# Patient Record
Sex: MALE | Race: WHITE | Employment: OTHER | URBAN - METROPOLITAN AREA
[De-identification: names, ages, dates, MRNs, and addresses within clinical notes are randomized per-mention and may not be internally consistent; named-entity substitution may affect disease eponyms.]

---

## 2018-03-27 ENCOUNTER — APPOINTMENT (OUTPATIENT)
Dept: GENERAL RADIOLOGY | Age: 63
End: 2018-03-27
Payer: COMMERCIAL

## 2018-03-27 ENCOUNTER — HOSPITAL ENCOUNTER (EMERGENCY)
Age: 63
Discharge: HOME | End: 2018-03-27
Attending: EMERGENCY MEDICINE
Payer: COMMERCIAL

## 2018-03-27 VITALS
HEART RATE: 89 BPM | TEMPERATURE: 98.2 F | DIASTOLIC BLOOD PRESSURE: 99 MMHG | SYSTOLIC BLOOD PRESSURE: 155 MMHG | OXYGEN SATURATION: 96 % | BODY MASS INDEX: 38.5 KG/M2 | HEIGHT: 74 IN | RESPIRATION RATE: 16 BRPM | WEIGHT: 300 LBS

## 2018-03-27 DIAGNOSIS — S39.012A STRAIN OF LUMBAR REGION, INITIAL ENCOUNTER: Primary | ICD-10-CM

## 2018-03-27 PROCEDURE — 99283 EMERGENCY DEPT VISIT LOW MDM: CPT

## 2018-03-27 PROCEDURE — 6370000000 HC RX 637 (ALT 250 FOR IP): Performed by: EMERGENCY MEDICINE

## 2018-03-27 PROCEDURE — 72100 X-RAY EXAM L-S SPINE 2/3 VWS: CPT

## 2018-03-27 RX ORDER — DIAZEPAM 5 MG/1
5 TABLET ORAL ONCE
Status: COMPLETED | OUTPATIENT
Start: 2018-03-27 | End: 2018-03-27

## 2018-03-27 RX ORDER — DIAZEPAM 5 MG/1
5 TABLET ORAL EVERY 6 HOURS PRN
Qty: 9 TABLET | Refills: 0 | Status: SHIPPED | OUTPATIENT
Start: 2018-03-27 | End: 2018-04-06

## 2018-03-27 RX ORDER — OXYCODONE HYDROCHLORIDE AND ACETAMINOPHEN 5; 325 MG/1; MG/1
1 TABLET ORAL EVERY 4 HOURS PRN
Qty: 12 TABLET | Refills: 0 | Status: SHIPPED | OUTPATIENT
Start: 2018-03-27 | End: 2018-04-03

## 2018-03-27 RX ORDER — OXYCODONE HYDROCHLORIDE AND ACETAMINOPHEN 5; 325 MG/1; MG/1
1 TABLET ORAL ONCE
Status: COMPLETED | OUTPATIENT
Start: 2018-03-27 | End: 2018-03-27

## 2018-03-27 RX ADMIN — OXYCODONE HYDROCHLORIDE AND ACETAMINOPHEN 1 TABLET: 5; 325 TABLET ORAL at 16:27

## 2018-03-27 RX ADMIN — DIAZEPAM 5 MG: 5 TABLET ORAL at 16:27

## 2018-03-27 ASSESSMENT — PAIN SCALES - GENERAL
PAINLEVEL_OUTOF10: 4
PAINLEVEL_OUTOF10: 0
PAINLEVEL_OUTOF10: 2

## 2018-03-27 ASSESSMENT — PAIN DESCRIPTION - PAIN TYPE: TYPE: ACUTE PAIN

## 2018-03-27 ASSESSMENT — PAIN DESCRIPTION - LOCATION: LOCATION: BACK

## 2018-03-27 NOTE — ED PROVIDER NOTES
HPI:   Venecia Lee is a 60 y.o. male presenting to the ED for back pain, beginning one day ago. The complaint has been persistent, mild in severity, and worsened by nothing. Pt states he was at work when he kicked something and began to have worsening back pain. Pt has hx of chronic back pain but does not take any medications for it. He states the pain has worsened since yesterdays event. Pt is also reporting leg numbness but states it has been there for about a month. He called his PCP for the pain and they advised him to come here. Pt denies cough, congestion, fever, chills, N/V/D, abdominal pain, constipation, chest pain, SOB, headache, urinary problems, neck pain or any other symptoms. ROS:   Pertinent positives and negatives are stated within HPI, all other systems reviewed and are negative.    --------------------------------------------- PAST HISTORY ---------------------------------------------  Past Medical History:  has a past medical history of COPD (chronic obstructive pulmonary disease) ([facility] [address] Osteoarthritis; and Sleep apnea. Past Surgical History:  has a past surgical history that includes hernia repair; Uvulectomy; Nose surgery; and Tympanostomy tube placement. Social History:  reports that he quit smoking about 4 years ago. His smoking use included Cigars and Cigarettes. He has a 78.00 pack-year smoking history. He has never used smokeless tobacco. He reports that he drinks alcohol. He reports that he does not use drugs. Family History: family history includes Cancer in his mother; Cirrhosis in his father; High Blood Pressure in his brother; Lupus in his sister; Other in his mother. The patients home medications have been reviewed. Allergies:  Ancef [cefazolin]    -------------------------------------------------- RESULTS -------------------------------------------------  All laboratory and radiology results have been personally reviewed by myself   LABS:  No results

## 2018-03-27 NOTE — ED NOTES
Patient discharged with belongings. Discussed care instructions, follow-up instructions, medications and when to return to the hospital. Patient verbalizes understanding and has no further questions at this time. Electronically signed by Shabnam Ward.  ASHLYN Roman on 3/27/2018 at 5:13 PM       Gerson Lucas RN  03/27/18 [id number]

## 2018-04-29 ENCOUNTER — ANESTHESIA EVENT (OUTPATIENT)
Dept: OPERATING ROOM | Age: 63
DRG: 494 | End: 2018-04-29
Payer: COMMERCIAL

## 2018-04-29 ENCOUNTER — HOSPITAL ENCOUNTER (INPATIENT)
Age: 63
LOS: 3 days | Discharge: NURSING, REHAB OR LONG-TERM CARE | DRG: 494 | End: 2018-05-02
Attending: EMERGENCY MEDICINE | Admitting: FAMILY MEDICINE
Payer: COMMERCIAL

## 2018-04-29 ENCOUNTER — APPOINTMENT (OUTPATIENT)
Dept: GENERAL RADIOLOGY | Age: 63
DRG: 494 | End: 2018-04-29
Payer: COMMERCIAL

## 2018-04-29 ENCOUNTER — ANESTHESIA (OUTPATIENT)
Dept: OPERATING ROOM | Age: 63
DRG: 494 | End: 2018-04-29
Payer: COMMERCIAL

## 2018-04-29 VITALS — DIASTOLIC BLOOD PRESSURE: 87 MMHG | OXYGEN SATURATION: 93 % | SYSTOLIC BLOOD PRESSURE: 137 MMHG | TEMPERATURE: 97.5 F

## 2018-04-29 DIAGNOSIS — S82.892A CLOSED FRACTURE OF LEFT ANKLE, INITIAL ENCOUNTER: Primary | ICD-10-CM

## 2018-04-29 DIAGNOSIS — R52 PAIN: ICD-10-CM

## 2018-04-29 PROBLEM — S82.62XA DISPLACED FRACTURE OF LATERAL MALLEOLUS OF LEFT FIBULA, INITIAL ENCOUNTER FOR CLOSED FRACTURE: Status: ACTIVE | Noted: 2018-04-29

## 2018-04-29 PROBLEM — S93.422A: Status: ACTIVE | Noted: 2018-04-29

## 2018-04-29 LAB
ABO/RH: NORMAL
ALBUMIN SERPL-MCNC: 4.3 G/DL (ref 3.5–5.2)
ALP BLD-CCNC: 80 U/L (ref 40–129)
ALT SERPL-CCNC: 80 U/L (ref 0–40)
ANION GAP SERPL CALCULATED.3IONS-SCNC: 14 MMOL/L (ref 7–16)
ANTIBODY SCREEN: NORMAL
AST SERPL-CCNC: 40 U/L (ref 0–39)
BASOPHILS ABSOLUTE: 0.06 E9/L (ref 0–0.2)
BASOPHILS RELATIVE PERCENT: 0.5 % (ref 0–2)
BILIRUB SERPL-MCNC: 0.5 MG/DL (ref 0–1.2)
BUN BLDV-MCNC: 11 MG/DL (ref 8–23)
CALCIUM SERPL-MCNC: 9.4 MG/DL (ref 8.6–10.2)
CHLORIDE BLD-SCNC: 100 MMOL/L (ref 98–107)
CO2: 23 MMOL/L (ref 22–29)
CREAT SERPL-MCNC: 0.9 MG/DL (ref 0.7–1.2)
EOSINOPHILS ABSOLUTE: 0.09 E9/L (ref 0.05–0.5)
EOSINOPHILS RELATIVE PERCENT: 0.8 % (ref 0–6)
GFR AFRICAN AMERICAN: >60
GFR NON-AFRICAN AMERICAN: >60 ML/MIN/1.73
GLUCOSE BLD-MCNC: 120 MG/DL (ref 74–109)
HCT VFR BLD CALC: 45.5 % (ref 37–54)
HEMOGLOBIN: 15.7 G/DL (ref 12.5–16.5)
IMMATURE GRANULOCYTES #: 0.05 E9/L
IMMATURE GRANULOCYTES %: 0.5 % (ref 0–5)
INR BLD: 1
LYMPHOCYTES ABSOLUTE: 1.98 E9/L (ref 1.5–4)
LYMPHOCYTES RELATIVE PERCENT: 18 % (ref 20–42)
MCH RBC QN AUTO: 32 PG (ref 26–35)
MCHC RBC AUTO-ENTMCNC: 34.5 % (ref 32–34.5)
MCV RBC AUTO: 92.9 FL (ref 80–99.9)
MONOCYTES ABSOLUTE: 1.19 E9/L (ref 0.1–0.95)
MONOCYTES RELATIVE PERCENT: 10.8 % (ref 2–12)
NEUTROPHILS ABSOLUTE: 7.66 E9/L (ref 1.8–7.3)
NEUTROPHILS RELATIVE PERCENT: 69.4 % (ref 43–80)
PDW BLD-RTO: 13.3 FL (ref 11.5–15)
PLATELET # BLD: 246 E9/L (ref 130–450)
PMV BLD AUTO: 9.7 FL (ref 7–12)
POTASSIUM SERPL-SCNC: 4.5 MMOL/L (ref 3.5–5)
PROTHROMBIN TIME: 11.3 SEC (ref 9.3–12.4)
RBC # BLD: 4.9 E12/L (ref 3.8–5.8)
SODIUM BLD-SCNC: 137 MMOL/L (ref 132–146)
TOTAL PROTEIN: 7.4 G/DL (ref 6.4–8.3)
WBC # BLD: 11 E9/L (ref 4.5–11.5)

## 2018-04-29 PROCEDURE — 73610 X-RAY EXAM OF ANKLE: CPT

## 2018-04-29 PROCEDURE — 86901 BLOOD TYPING SEROLOGIC RH(D): CPT

## 2018-04-29 PROCEDURE — 2500000003 HC RX 250 WO HCPCS: Performed by: NURSE ANESTHETIST, CERTIFIED REGISTERED

## 2018-04-29 PROCEDURE — 2580000003 HC RX 258: Performed by: FAMILY MEDICINE

## 2018-04-29 PROCEDURE — 6360000002 HC RX W HCPCS

## 2018-04-29 PROCEDURE — 29515 APPLICATION SHORT LEG SPLINT: CPT

## 2018-04-29 PROCEDURE — 99285 EMERGENCY DEPT VISIT HI MDM: CPT

## 2018-04-29 PROCEDURE — 2500000003 HC RX 250 WO HCPCS: Performed by: ORTHOPAEDIC SURGERY

## 2018-04-29 PROCEDURE — 6360000002 HC RX W HCPCS: Performed by: NURSE ANESTHETIST, CERTIFIED REGISTERED

## 2018-04-29 PROCEDURE — 76000 FLUOROSCOPY <1 HR PHYS/QHP: CPT

## 2018-04-29 PROCEDURE — 2580000003 HC RX 258: Performed by: NURSE ANESTHETIST, CERTIFIED REGISTERED

## 2018-04-29 PROCEDURE — 87081 CULTURE SCREEN ONLY: CPT

## 2018-04-29 PROCEDURE — 6370000000 HC RX 637 (ALT 250 FOR IP): Performed by: ORTHOPAEDIC SURGERY

## 2018-04-29 PROCEDURE — 3700000001 HC ADD 15 MINUTES (ANESTHESIA): Performed by: ORTHOPAEDIC SURGERY

## 2018-04-29 PROCEDURE — 7100000001 HC PACU RECOVERY - ADDTL 15 MIN: Performed by: ORTHOPAEDIC SURGERY

## 2018-04-29 PROCEDURE — 7100000000 HC PACU RECOVERY - FIRST 15 MIN: Performed by: ORTHOPAEDIC SURGERY

## 2018-04-29 PROCEDURE — 3600000004 HC SURGERY LEVEL 4 BASE: Performed by: ORTHOPAEDIC SURGERY

## 2018-04-29 PROCEDURE — C1713 ANCHOR/SCREW BN/BN,TIS/BN: HCPCS | Performed by: ORTHOPAEDIC SURGERY

## 2018-04-29 PROCEDURE — 3600000014 HC SURGERY LEVEL 4 ADDTL 15MIN: Performed by: ORTHOPAEDIC SURGERY

## 2018-04-29 PROCEDURE — 6360000002 HC RX W HCPCS: Performed by: FAMILY MEDICINE

## 2018-04-29 PROCEDURE — 86900 BLOOD TYPING SEROLOGIC ABO: CPT

## 2018-04-29 PROCEDURE — 1200000000 HC SEMI PRIVATE

## 2018-04-29 PROCEDURE — 85610 PROTHROMBIN TIME: CPT

## 2018-04-29 PROCEDURE — 36415 COLL VENOUS BLD VENIPUNCTURE: CPT

## 2018-04-29 PROCEDURE — 0QSK04Z REPOSITION LEFT FIBULA WITH INTERNAL FIXATION DEVICE, OPEN APPROACH: ICD-10-PCS | Performed by: ORTHOPAEDIC SURGERY

## 2018-04-29 PROCEDURE — 80053 COMPREHEN METABOLIC PANEL: CPT

## 2018-04-29 PROCEDURE — 86850 RBC ANTIBODY SCREEN: CPT

## 2018-04-29 PROCEDURE — 85025 COMPLETE CBC W/AUTO DIFF WBC: CPT

## 2018-04-29 PROCEDURE — 94761 N-INVAS EAR/PLS OXIMETRY MLT: CPT

## 2018-04-29 PROCEDURE — 6360000002 HC RX W HCPCS: Performed by: ANESTHESIOLOGY

## 2018-04-29 PROCEDURE — 3700000000 HC ANESTHESIA ATTENDED CARE: Performed by: ORTHOPAEDIC SURGERY

## 2018-04-29 PROCEDURE — 2709999900 HC NON-CHARGEABLE SUPPLY: Performed by: ORTHOPAEDIC SURGERY

## 2018-04-29 DEVICE — IMPLANTABLE DEVICE: Type: IMPLANTABLE DEVICE | Status: FUNCTIONAL

## 2018-04-29 RX ORDER — PROMETHAZINE HYDROCHLORIDE 25 MG/ML
6.25 INJECTION, SOLUTION INTRAMUSCULAR; INTRAVENOUS
Status: DISCONTINUED | OUTPATIENT
Start: 2018-04-29 | End: 2018-04-29

## 2018-04-29 RX ORDER — DEXAMETHASONE SODIUM PHOSPHATE 4 MG/ML
INJECTION, SOLUTION INTRA-ARTICULAR; INTRALESIONAL; INTRAMUSCULAR; INTRAVENOUS; SOFT TISSUE PRN
Status: DISCONTINUED | OUTPATIENT
Start: 2018-04-29 | End: 2018-04-29 | Stop reason: SDUPTHER

## 2018-04-29 RX ORDER — HYDRALAZINE HYDROCHLORIDE 20 MG/ML
INJECTION INTRAMUSCULAR; INTRAVENOUS
Status: COMPLETED
Start: 2018-04-29 | End: 2018-04-29

## 2018-04-29 RX ORDER — OXYCODONE HYDROCHLORIDE AND ACETAMINOPHEN 5; 325 MG/1; MG/1
1 TABLET ORAL
Status: DISCONTINUED | OUTPATIENT
Start: 2018-04-29 | End: 2018-04-29

## 2018-04-29 RX ORDER — MEPERIDINE HYDROCHLORIDE 25 MG/ML
12.5 INJECTION INTRAMUSCULAR; INTRAVENOUS; SUBCUTANEOUS EVERY 5 MIN PRN
Status: DISCONTINUED | OUTPATIENT
Start: 2018-04-29 | End: 2018-04-29

## 2018-04-29 RX ORDER — HYDROCODONE BITARTRATE AND ACETAMINOPHEN 5; 325 MG/1; MG/1
0.5 TABLET ORAL EVERY 6 HOURS PRN
Status: DISCONTINUED | OUTPATIENT
Start: 2018-04-29 | End: 2018-05-02 | Stop reason: HOSPADM

## 2018-04-29 RX ORDER — HYDROCODONE BITARTRATE AND ACETAMINOPHEN 5; 325 MG/1; MG/1
1 TABLET ORAL EVERY 6 HOURS PRN
Status: DISCONTINUED | OUTPATIENT
Start: 2018-04-29 | End: 2018-05-02 | Stop reason: HOSPADM

## 2018-04-29 RX ORDER — PROPOFOL 10 MG/ML
INJECTION, EMULSION INTRAVENOUS PRN
Status: DISCONTINUED | OUTPATIENT
Start: 2018-04-29 | End: 2018-04-29 | Stop reason: SDUPTHER

## 2018-04-29 RX ORDER — DIPHENHYDRAMINE HYDROCHLORIDE 50 MG/ML
12.5 INJECTION INTRAMUSCULAR; INTRAVENOUS
Status: DISCONTINUED | OUTPATIENT
Start: 2018-04-29 | End: 2018-04-29

## 2018-04-29 RX ORDER — BUPIVACAINE HYDROCHLORIDE AND EPINEPHRINE 2.5; 5 MG/ML; UG/ML
INJECTION, SOLUTION EPIDURAL; INFILTRATION; INTRACAUDAL; PERINEURAL PRN
Status: DISCONTINUED | OUTPATIENT
Start: 2018-04-29 | End: 2018-04-29 | Stop reason: HOSPADM

## 2018-04-29 RX ORDER — NEOSTIGMINE METHYLSULFATE 1 MG/ML
INJECTION, SOLUTION INTRAVENOUS PRN
Status: DISCONTINUED | OUTPATIENT
Start: 2018-04-29 | End: 2018-04-29 | Stop reason: SDUPTHER

## 2018-04-29 RX ORDER — MORPHINE SULFATE 4 MG/ML
4 INJECTION, SOLUTION INTRAMUSCULAR; INTRAVENOUS
Status: DISCONTINUED | OUTPATIENT
Start: 2018-04-29 | End: 2018-05-02 | Stop reason: HOSPADM

## 2018-04-29 RX ORDER — FENTANYL CITRATE 50 UG/ML
25 INJECTION, SOLUTION INTRAMUSCULAR; INTRAVENOUS EVERY 5 MIN PRN
Status: DISCONTINUED | OUTPATIENT
Start: 2018-04-29 | End: 2018-04-29

## 2018-04-29 RX ORDER — SODIUM CHLORIDE, SODIUM LACTATE, POTASSIUM CHLORIDE, CALCIUM CHLORIDE 600; 310; 30; 20 MG/100ML; MG/100ML; MG/100ML; MG/100ML
INJECTION, SOLUTION INTRAVENOUS CONTINUOUS PRN
Status: DISCONTINUED | OUTPATIENT
Start: 2018-04-29 | End: 2018-04-29 | Stop reason: SDUPTHER

## 2018-04-29 RX ORDER — ACETAMINOPHEN 325 MG/1
650 TABLET ORAL EVERY 4 HOURS PRN
Status: DISCONTINUED | OUTPATIENT
Start: 2018-04-29 | End: 2018-05-02 | Stop reason: HOSPADM

## 2018-04-29 RX ORDER — LABETALOL HYDROCHLORIDE 5 MG/ML
INJECTION, SOLUTION INTRAVENOUS PRN
Status: DISCONTINUED | OUTPATIENT
Start: 2018-04-29 | End: 2018-04-29 | Stop reason: SDUPTHER

## 2018-04-29 RX ORDER — SUCCINYLCHOLINE CHLORIDE 20 MG/ML
INJECTION INTRAMUSCULAR; INTRAVENOUS PRN
Status: DISCONTINUED | OUTPATIENT
Start: 2018-04-29 | End: 2018-04-29 | Stop reason: SDUPTHER

## 2018-04-29 RX ORDER — ROCURONIUM BROMIDE 10 MG/ML
INJECTION, SOLUTION INTRAVENOUS PRN
Status: DISCONTINUED | OUTPATIENT
Start: 2018-04-29 | End: 2018-04-29 | Stop reason: SDUPTHER

## 2018-04-29 RX ORDER — LIDOCAINE HYDROCHLORIDE 20 MG/ML
INJECTION, SOLUTION EPIDURAL; INFILTRATION; INTRACAUDAL; PERINEURAL PRN
Status: DISCONTINUED | OUTPATIENT
Start: 2018-04-29 | End: 2018-04-29 | Stop reason: SDUPTHER

## 2018-04-29 RX ORDER — ONDANSETRON 2 MG/ML
INJECTION INTRAMUSCULAR; INTRAVENOUS PRN
Status: DISCONTINUED | OUTPATIENT
Start: 2018-04-29 | End: 2018-04-29 | Stop reason: SDUPTHER

## 2018-04-29 RX ORDER — HYDRALAZINE HYDROCHLORIDE 20 MG/ML
5 INJECTION INTRAMUSCULAR; INTRAVENOUS ONCE
Status: COMPLETED | OUTPATIENT
Start: 2018-04-29 | End: 2018-04-29

## 2018-04-29 RX ORDER — SODIUM CHLORIDE 9 MG/ML
INJECTION, SOLUTION INTRAVENOUS CONTINUOUS
Status: DISCONTINUED | OUTPATIENT
Start: 2018-04-29 | End: 2018-05-01

## 2018-04-29 RX ORDER — FENTANYL CITRATE 50 UG/ML
50 INJECTION, SOLUTION INTRAMUSCULAR; INTRAVENOUS EVERY 5 MIN PRN
Status: DISCONTINUED | OUTPATIENT
Start: 2018-04-29 | End: 2018-04-29

## 2018-04-29 RX ORDER — SODIUM CHLORIDE 0.9 % (FLUSH) 0.9 %
10 SYRINGE (ML) INJECTION PRN
Status: DISCONTINUED | OUTPATIENT
Start: 2018-04-29 | End: 2018-05-02 | Stop reason: HOSPADM

## 2018-04-29 RX ORDER — GLYCOPYRROLATE 0.2 MG/ML
INJECTION INTRAMUSCULAR; INTRAVENOUS PRN
Status: DISCONTINUED | OUTPATIENT
Start: 2018-04-29 | End: 2018-04-29 | Stop reason: SDUPTHER

## 2018-04-29 RX ORDER — ALBUTEROL SULFATE 2.5 MG/3ML
2.5 SOLUTION RESPIRATORY (INHALATION) EVERY 6 HOURS PRN
Status: DISCONTINUED | OUTPATIENT
Start: 2018-04-29 | End: 2018-05-02 | Stop reason: HOSPADM

## 2018-04-29 RX ORDER — ASPIRIN 81 MG/1
81 TABLET ORAL DAILY
Status: DISCONTINUED | OUTPATIENT
Start: 2018-04-30 | End: 2018-05-02 | Stop reason: HOSPADM

## 2018-04-29 RX ORDER — ONDANSETRON 2 MG/ML
4 INJECTION INTRAMUSCULAR; INTRAVENOUS EVERY 6 HOURS PRN
Status: DISCONTINUED | OUTPATIENT
Start: 2018-04-29 | End: 2018-05-02 | Stop reason: HOSPADM

## 2018-04-29 RX ORDER — FENTANYL CITRATE 50 UG/ML
INJECTION, SOLUTION INTRAMUSCULAR; INTRAVENOUS PRN
Status: DISCONTINUED | OUTPATIENT
Start: 2018-04-29 | End: 2018-04-29 | Stop reason: SDUPTHER

## 2018-04-29 RX ORDER — MIDAZOLAM HYDROCHLORIDE 1 MG/ML
INJECTION INTRAMUSCULAR; INTRAVENOUS PRN
Status: DISCONTINUED | OUTPATIENT
Start: 2018-04-29 | End: 2018-04-29 | Stop reason: SDUPTHER

## 2018-04-29 RX ORDER — CLINDAMYCIN PHOSPHATE 600 MG/50ML
600 INJECTION INTRAVENOUS EVERY 8 HOURS
Status: COMPLETED | OUTPATIENT
Start: 2018-04-29 | End: 2018-04-30

## 2018-04-29 RX ORDER — ERGOCALCIFEROL 1.25 MG/1
50000 CAPSULE ORAL WEEKLY
Status: DISCONTINUED | OUTPATIENT
Start: 2018-04-29 | End: 2018-05-02 | Stop reason: HOSPADM

## 2018-04-29 RX ORDER — MORPHINE SULFATE 2 MG/ML
2 INJECTION, SOLUTION INTRAMUSCULAR; INTRAVENOUS EVERY 4 HOURS PRN
Status: DISCONTINUED | OUTPATIENT
Start: 2018-04-29 | End: 2018-05-02 | Stop reason: HOSPADM

## 2018-04-29 RX ORDER — SODIUM CHLORIDE 0.9 % (FLUSH) 0.9 %
10 SYRINGE (ML) INJECTION EVERY 12 HOURS SCHEDULED
Status: DISCONTINUED | OUTPATIENT
Start: 2018-04-29 | End: 2018-05-02 | Stop reason: HOSPADM

## 2018-04-29 RX ORDER — CLINDAMYCIN PHOSPHATE 600 MG/50ML
600 INJECTION INTRAVENOUS ONCE
Status: COMPLETED | OUTPATIENT
Start: 2018-04-29 | End: 2018-04-29

## 2018-04-29 RX ADMIN — FENTANYL CITRATE 50 MCG: 50 INJECTION, SOLUTION INTRAMUSCULAR; INTRAVENOUS at 14:44

## 2018-04-29 RX ADMIN — Medication 10 ML: at 09:10

## 2018-04-29 RX ADMIN — ONDANSETRON 4 MG: 2 INJECTION, SOLUTION INTRAMUSCULAR; INTRAVENOUS at 14:55

## 2018-04-29 RX ADMIN — DEXAMETHASONE SODIUM PHOSPHATE 10 MG: 4 INJECTION, SOLUTION INTRA-ARTICULAR; INTRALESIONAL; INTRAMUSCULAR; INTRAVENOUS; SOFT TISSUE at 14:55

## 2018-04-29 RX ADMIN — FENTANYL CITRATE 100 MCG: 50 INJECTION, SOLUTION INTRAMUSCULAR; INTRAVENOUS at 15:45

## 2018-04-29 RX ADMIN — HYDROMORPHONE HYDROCHLORIDE 0.5 MG: 1 INJECTION, SOLUTION INTRAMUSCULAR; INTRAVENOUS; SUBCUTANEOUS at 16:07

## 2018-04-29 RX ADMIN — LIDOCAINE HYDROCHLORIDE 80 MG: 20 INJECTION, SOLUTION EPIDURAL; INFILTRATION; INTRACAUDAL; PERINEURAL at 14:44

## 2018-04-29 RX ADMIN — Medication 180 MG: at 14:44

## 2018-04-29 RX ADMIN — ERGOCALCIFEROL 50000 UNITS: 1.25 CAPSULE ORAL at 22:11

## 2018-04-29 RX ADMIN — LABETALOL HYDROCHLORIDE 10 MG: 5 INJECTION, SOLUTION INTRAVENOUS at 14:59

## 2018-04-29 RX ADMIN — MORPHINE SULFATE 2 MG: 2 INJECTION, SOLUTION INTRAMUSCULAR; INTRAVENOUS at 10:14

## 2018-04-29 RX ADMIN — FENTANYL CITRATE 50 MCG: 50 INJECTION, SOLUTION INTRAMUSCULAR; INTRAVENOUS at 16:00

## 2018-04-29 RX ADMIN — FENTANYL CITRATE 50 MCG: 50 INJECTION, SOLUTION INTRAMUSCULAR; INTRAVENOUS at 15:54

## 2018-04-29 RX ADMIN — ROCURONIUM BROMIDE 20 MG: 10 SOLUTION INTRAVENOUS at 14:55

## 2018-04-29 RX ADMIN — SODIUM CHLORIDE, POTASSIUM CHLORIDE, SODIUM LACTATE AND CALCIUM CHLORIDE: 600; 310; 30; 20 INJECTION, SOLUTION INTRAVENOUS at 14:25

## 2018-04-29 RX ADMIN — CLINDAMYCIN PHOSPHATE 600 MG: 600 INJECTION INTRAVENOUS at 22:11

## 2018-04-29 RX ADMIN — MIDAZOLAM HYDROCHLORIDE 2 MG: 1 INJECTION, SOLUTION INTRAMUSCULAR; INTRAVENOUS at 14:39

## 2018-04-29 RX ADMIN — PROPOFOL 200 MG: 10 INJECTION, EMULSION INTRAVENOUS at 14:44

## 2018-04-29 RX ADMIN — Medication 3 MG: at 15:30

## 2018-04-29 RX ADMIN — HYDRALAZINE HYDROCHLORIDE 5 MG: 20 INJECTION INTRAMUSCULAR; INTRAVENOUS at 16:28

## 2018-04-29 RX ADMIN — HYDROCODONE BITARTRATE AND ACETAMINOPHEN 1 TABLET: 5; 325 TABLET ORAL at 18:20

## 2018-04-29 RX ADMIN — FENTANYL CITRATE 50 MCG: 50 INJECTION, SOLUTION INTRAMUSCULAR; INTRAVENOUS at 14:55

## 2018-04-29 RX ADMIN — CLINDAMYCIN PHOSPHATE 600 MG: 600 INJECTION INTRAVENOUS at 14:25

## 2018-04-29 RX ADMIN — Medication 0.6 MG: at 15:30

## 2018-04-29 RX ADMIN — SODIUM CHLORIDE: 9 INJECTION, SOLUTION INTRAVENOUS at 10:09

## 2018-04-29 RX ADMIN — FENTANYL CITRATE 50 MCG: 50 INJECTION, SOLUTION INTRAMUSCULAR; INTRAVENOUS at 14:39

## 2018-04-29 RX ADMIN — LABETALOL HYDROCHLORIDE 5 MG: 5 INJECTION, SOLUTION INTRAVENOUS at 15:32

## 2018-04-29 ASSESSMENT — PAIN DESCRIPTION - ONSET
ONSET: GRADUAL

## 2018-04-29 ASSESSMENT — PULMONARY FUNCTION TESTS
PIF_VALUE: 4
PIF_VALUE: 2
PIF_VALUE: 27
PIF_VALUE: 22
PIF_VALUE: 22
PIF_VALUE: 27
PIF_VALUE: 2
PIF_VALUE: 1
PIF_VALUE: 35
PIF_VALUE: 27
PIF_VALUE: 22
PIF_VALUE: 22
PIF_VALUE: 27
PIF_VALUE: 27
PIF_VALUE: 25
PIF_VALUE: 27
PIF_VALUE: 27
PIF_VALUE: 21
PIF_VALUE: 27
PIF_VALUE: 36
PIF_VALUE: 27
PIF_VALUE: 1
PIF_VALUE: 1
PIF_VALUE: 27
PIF_VALUE: 27
PIF_VALUE: 22
PIF_VALUE: 25
PIF_VALUE: 27
PIF_VALUE: 25
PIF_VALUE: 27
PIF_VALUE: 26
PIF_VALUE: 27
PIF_VALUE: 1
PIF_VALUE: 27
PIF_VALUE: 27
PIF_VALUE: 22
PIF_VALUE: 27
PIF_VALUE: 21
PIF_VALUE: 25
PIF_VALUE: 1
PIF_VALUE: 28
PIF_VALUE: 26
PIF_VALUE: 37
PIF_VALUE: 27
PIF_VALUE: 26
PIF_VALUE: 27
PIF_VALUE: 27
PIF_VALUE: 25
PIF_VALUE: 27
PIF_VALUE: 27

## 2018-04-29 ASSESSMENT — PAIN SCALES - GENERAL
PAINLEVEL_OUTOF10: 4
PAINLEVEL_OUTOF10: 9
PAINLEVEL_OUTOF10: 5
PAINLEVEL_OUTOF10: 6
PAINLEVEL_OUTOF10: 7
PAINLEVEL_OUTOF10: 7
PAINLEVEL_OUTOF10: 6
PAINLEVEL_OUTOF10: 9
PAINLEVEL_OUTOF10: 5
PAINLEVEL_OUTOF10: 7
PAINLEVEL_OUTOF10: 6

## 2018-04-29 ASSESSMENT — PAIN DESCRIPTION - LOCATION
LOCATION: ANKLE

## 2018-04-29 ASSESSMENT — PAIN DESCRIPTION - PAIN TYPE
TYPE: ACUTE PAIN
TYPE: SURGICAL PAIN
TYPE: ACUTE PAIN

## 2018-04-29 ASSESSMENT — PAIN DESCRIPTION - FREQUENCY
FREQUENCY: CONTINUOUS

## 2018-04-29 ASSESSMENT — PAIN DESCRIPTION - PROGRESSION
CLINICAL_PROGRESSION: GRADUALLY IMPROVING
CLINICAL_PROGRESSION: GRADUALLY WORSENING
CLINICAL_PROGRESSION: GRADUALLY WORSENING
CLINICAL_PROGRESSION: NOT CHANGED
CLINICAL_PROGRESSION: NOT CHANGED
CLINICAL_PROGRESSION: GRADUALLY WORSENING

## 2018-04-29 ASSESSMENT — PAIN DESCRIPTION - ORIENTATION
ORIENTATION: LEFT

## 2018-04-29 ASSESSMENT — PAIN DESCRIPTION - DESCRIPTORS
DESCRIPTORS: DISCOMFORT
DESCRIPTORS: ACHING;DISCOMFORT
DESCRIPTORS: DISCOMFORT
DESCRIPTORS: ACHING;DISCOMFORT
DESCRIPTORS: ACHING;DISCOMFORT
DESCRIPTORS: PATIENT UNABLE TO DESCRIBE
DESCRIPTORS: DISCOMFORT

## 2018-04-29 NOTE — ED PROVIDER NOTES
[facility]     Department of Emergency Medicine   ED  Provider Note  Admit Date/RoomTime: 4/29/2018  2:46 AM  ED Room: 23/23  Chief Complaint   Foot Injury    History of Present Illness   Source of history provided by:  patient. History/Exam Limitations: none. Neil Le is a 60 y.o. old male who has a past medical history of:   Past Medical History:   Diagnosis Date    COPD (chronic obstructive pulmonary disease) ([facility] [address]     Osteoarthritis     Sleep apnea     Spinal stenosis     presents to the emergency department via EMS from home, for a fall which occured 1 hour(s) prior to arrival. He was reportedly getting intyo house using walker while at home prior to incident with complaints of left ankle pain. The patients tetanus status is up to date. Since onset the symptoms have been moderate in degree. His pain is aggraveated by movement, use and palpation and relieved by splint applied by EMS . He denies any head injury, loss of consciousness, neck pain, chest pain or abdominal pain. ROS    Pertinent positives and negatives are stated within HPI, all other systems reviewed and are negative. Past Surgical History:   Procedure Laterality Date    HERNIA REPAIR      NOSE SURGERY      TYMPANOSTOMY TUBE PLACEMENT      UVULECTOMY     Social History:  reports that he quit smoking about 4 years ago. His smoking use included Cigars and Cigarettes. He has a 78.00 pack-year smoking history. He has never used smokeless tobacco. He reports that he drinks alcohol. He reports that he does not use drugs. Family History: family history includes Cancer in his mother; Cirrhosis in his father; High Blood Pressure in his brother; Lupus in his sister; Other in his mother. Allergies:  Ancef [cefazolin]    Physical Exam   Oxygen Saturation Interpretation: Normal.  ED Triage Vitals [04/29/18 0252]   BP Temp Temp Source Pulse Resp SpO2 Height Weight   (!) 161/83 97.7 °F (36.5 °C) Oral 77 18 94 % 6' 2\"

## 2018-04-29 NOTE — ED NOTES
Bed: 23  Expected date:   Expected time:   Means of arrival:   Comments:  EMS fall     Anneliese Antoine RN  20/04/66 0246

## 2018-04-29 NOTE — CONSULTS
Cc left ankle pain    hpi  Fell getting into house, using walker    Dx left displaced lateral mallelous and deltoid ligament injury    recc internal fixation    anes time approx 45 min and no ebl    Orders placed for surgery this afternoon    Will see patient prior to surgery    Past Surgical History:   Procedure Laterality Date    HERNIA REPAIR      NOSE SURGERY      TYMPANOSTOMY TUBE PLACEMENT      UVULECTOMY       Past Medical History:   Diagnosis Date    [facility]ch[facility] obstructive pulmonary disease) ([facility] [address]     Osteoarthritis     Sleep apnea     Spinal stenosis      Scheduled Meds:   sodium chloride flush  10 mL Intravenous 2 times per day    clindamycin (CLEOCIN) IV  600 mg Intravenous Once    vitamin D  50,000 Units Oral Weekly     Continuous Infusions:   sodium chloride       PRN Meds:.sodium chloride flush, acetaminophen, morphine, morphine, HYDROcodone 5 mg - acetaminophen, HYDROcodone 5 mg - acetaminophen, ondansetron  Allergies   Allergen Reactions    Ancef [Cefazolin]      etoh 7-8/day    tob neg    Ros Denies change in bowel or bladder habits. Denies unexpected weight loss. Denies temp      Family History   Problem Relation Age of Onset    Cirrhosis Father     Lupus Sister     Cancer Mother     Other Mother      copd    High Blood Pressure Brother      Vitals:    04/29/18 0750   BP: (!) 156/76   Pulse: 84   Resp: 16   Temp: 98.2 °F (36.8 °C)   SpO2: 93%     Risk, benefits and options discussed with patient. he has verbalized understanding of options. The possibility of complications were also discussed to include but not limited to nerve damage, infection, problems with healing, vascular injury, chronic pain, stiffness, dysfunction, repeat surgery, symptomatic hardware and or its failure, dislocation, and blood clot. Post-op care, work, activity and restrictions were also discussed with patient and they verbalized and agreed with the restrictions.

## 2018-04-30 PROCEDURE — 6370000000 HC RX 637 (ALT 250 FOR IP): Performed by: ORTHOPAEDIC SURGERY

## 2018-04-30 PROCEDURE — G8988 SELF CARE GOAL STATUS: HCPCS

## 2018-04-30 PROCEDURE — 97165 OT EVAL LOW COMPLEX 30 MIN: CPT

## 2018-04-30 PROCEDURE — G8978 MOBILITY CURRENT STATUS: HCPCS

## 2018-04-30 PROCEDURE — 97162 PT EVAL MOD COMPLEX 30 MIN: CPT

## 2018-04-30 PROCEDURE — 1200000000 HC SEMI PRIVATE

## 2018-04-30 PROCEDURE — 2580000003 HC RX 258: Performed by: FAMILY MEDICINE

## 2018-04-30 PROCEDURE — 99222 1ST HOSP IP/OBS MODERATE 55: CPT | Performed by: NEUROLOGICAL SURGERY

## 2018-04-30 PROCEDURE — 97530 THERAPEUTIC ACTIVITIES: CPT

## 2018-04-30 PROCEDURE — G8979 MOBILITY GOAL STATUS: HCPCS

## 2018-04-30 PROCEDURE — 2500000003 HC RX 250 WO HCPCS: Performed by: ORTHOPAEDIC SURGERY

## 2018-04-30 PROCEDURE — G8987 SELF CARE CURRENT STATUS: HCPCS

## 2018-04-30 RX ORDER — ASPIRIN 81 MG/1
81 TABLET ORAL DAILY
Qty: 45 TABLET | Refills: 0 | Status: SHIPPED | OUTPATIENT
Start: 2018-05-01 | End: 2019-01-14

## 2018-04-30 RX ORDER — HYDROCODONE BITARTRATE AND ACETAMINOPHEN 5; 325 MG/1; MG/1
1 TABLET ORAL EVERY 6 HOURS PRN
Qty: 28 TABLET | Refills: 0 | Status: SHIPPED | OUTPATIENT
Start: 2018-04-30 | End: 2018-05-07

## 2018-04-30 RX ADMIN — CLINDAMYCIN PHOSPHATE 600 MG: 600 INJECTION INTRAVENOUS at 14:20

## 2018-04-30 RX ADMIN — ASPIRIN 81 MG: 81 TABLET, COATED ORAL at 08:14

## 2018-04-30 RX ADMIN — HYDROCODONE BITARTRATE AND ACETAMINOPHEN 1 TABLET: 5; 325 TABLET ORAL at 20:37

## 2018-04-30 RX ADMIN — SODIUM CHLORIDE: 9 INJECTION, SOLUTION INTRAVENOUS at 14:20

## 2018-04-30 RX ADMIN — CLINDAMYCIN PHOSPHATE 600 MG: 600 INJECTION INTRAVENOUS at 06:39

## 2018-04-30 RX ADMIN — HYDROCODONE BITARTRATE AND ACETAMINOPHEN 1 TABLET: 5; 325 TABLET ORAL at 07:28

## 2018-04-30 RX ADMIN — HYDROCODONE BITARTRATE AND ACETAMINOPHEN 1 TABLET: 5; 325 TABLET ORAL at 01:26

## 2018-04-30 RX ADMIN — HYDROCODONE BITARTRATE AND ACETAMINOPHEN 1 TABLET: 5; 325 TABLET ORAL at 14:29

## 2018-04-30 ASSESSMENT — PAIN DESCRIPTION - LOCATION
LOCATION: ANKLE

## 2018-04-30 ASSESSMENT — PAIN DESCRIPTION - FREQUENCY
FREQUENCY: CONTINUOUS

## 2018-04-30 ASSESSMENT — PAIN DESCRIPTION - PROGRESSION
CLINICAL_PROGRESSION: GRADUALLY WORSENING
CLINICAL_PROGRESSION: GRADUALLY WORSENING

## 2018-04-30 ASSESSMENT — PAIN DESCRIPTION - ORIENTATION
ORIENTATION: LEFT

## 2018-04-30 ASSESSMENT — PAIN DESCRIPTION - PAIN TYPE
TYPE: SURGICAL PAIN;ACUTE PAIN
TYPE: SURGICAL PAIN

## 2018-04-30 ASSESSMENT — PAIN DESCRIPTION - ONSET
ONSET: ON-GOING
ONSET: ON-GOING

## 2018-04-30 ASSESSMENT — PAIN SCALES - GENERAL
PAINLEVEL_OUTOF10: 6
PAINLEVEL_OUTOF10: 4
PAINLEVEL_OUTOF10: 6
PAINLEVEL_OUTOF10: 0
PAINLEVEL_OUTOF10: 7
PAINLEVEL_OUTOF10: 4
PAINLEVEL_OUTOF10: 6

## 2018-04-30 ASSESSMENT — PAIN DESCRIPTION - DESCRIPTORS
DESCRIPTORS: ACHING;CONSTANT;DISCOMFORT
DESCRIPTORS: ACHING;DISCOMFORT
DESCRIPTORS: ACHING;DISCOMFORT
DESCRIPTORS: ACHING;DISCOMFORT;DULL

## 2018-04-30 NOTE — PROGRESS NOTES
Pod #1    comfortable    Vitals:    04/30/18 0736   BP: (!) 149/69   Pulse: 82   Resp: 16   Temp: 98.2 °F (36.8 °C)   SpO2: 99%     Dressing c/d/I  Toes intact  No change in diminished sensation    Xray good    Consult placed for Neurosurg    nwb lle  oob  Elevate ice  Ok to transfer from ortho standpoint

## 2018-04-30 NOTE — OP NOTE
fracture site was a little bit more lateral than  posterior, and the plate was entered as such. Three bicortical screws were  placed proximal to the fracture site. The clamp was removed. The  syndesmosis was checked. Hard copy fluoroscopy was obtained with  projections in the AP, lateral, and mortise position. There was no  evidence of medial clear space widening any further, and when I stressed  the ankle under live fluoro, it did not open up medially. The wound was  copiously irrigated, closed in layers with a deep 2-0 Vicryl stitch  followed by 2-0 subcutaneous and staples in the skin. It was sterilely  dressed, and he was placed into a U-splint. POSTOPERATIVE PLAN:  He will be nonweightbearing on his left leg. He will  follow up in the office in two weeks' time with x-rays out of plaster with  the plan to move him into a short-leg cast.  We will supplement his diet  with a baby aspirin for DVT prophylaxis for six weeks postop, and most  likely we will send him home on Norco for pain management.         Scarlett Forbes MD    D: 04/29/2018 15:59:39       T: 04/30/2018 1:01:36     [email or web address]  Job#: [id number]     Doc#: [id number]    CC:  Ana Matamoros DO

## 2018-04-30 NOTE — PROGRESS NOTES
Physical Therapy  Initial Assessment     Name: Katerine Sr  : 1955  MRN: [id number]    Date of Service: 2018    Evaluating PT: Bunny Chung, PT, DPT [id number]    Room #:  4918/9849-N    Diagnosis: L ankle fracture  Precautions: Fall risk, NWB L LE, spinal stenosis with B LE weakness/sensation deficits  Procedures: ORIF L ankle ()    Pt lives with wife in a single story, first level aparment with 1 stairs and no rail(s) to enter. Bed is on the first floor and bath is on the first floor. Pt ambulated with [employer] for approximately 6 weeks prior to admission due to increased LE weakness due to spinal stenosis. Wife works as a DJ in the PM but is home during the day. Initial Evaluation  Date: 18 Treatment Date: Short Term/ Long Term   Goals   AM-PAC 6 Clicks 60/90     Was pt agreeable to Eval/treatment? Yes     Does pt have pain? L ankle pain     Bed Mobility  Rolling: NT  Supine to sit: NT  Sit to supine: NT  Scooting: Min A toward bedside chair  Rolling: SBA  Supine to sit: SBA  Sit to supine: SBA  Scooting: SBA   Transfers Sit to stand: Mod A x2  Stand to sit: Mod A x2  Stand pivot: Max A x2 with [employer] x2 reps  Sit to stand: Min A  Stand to sit: Min A  Stand pivot: Min A with [employer]   Ambulation   NT due to safety concerns  10 feet with [employer] with Min A  Self-propel 50 feet with steer-able knee walker Mod Independent   Stair negotiation: NT  NA   ROM B UE: Refer to OT note  B LE: L ankle limited due to splint     Strength B UE: Refer to OT note  B LE: 4/5  5/5   Balance Sitting EOB: SBA  Dynamic standing: Max A x2  Sitting EOB: Supervision  Dynamic standing: Min A at [employer]     Pt is A & O x: 4 to person, place, month, and year. Sensation: Pt reports B LE numbness and tingling due to spinal stenosis. Edema: Unremarkable. ASSESSMENT    Patient education  Pt educated on proper technique during sit to stand transfer.     Patient response to education:   Pt verbalized understanding Pt demonstrated skill Pt

## 2018-04-30 NOTE — CONSULTS
[address] [address] NEUROSURGERY     Patient: Ricky Still   : 1955  MRN: [id number]    Date of Consultation: 2018  Date of Service: 2018    Reason for Consultation:Lumbar Stenosis     History of Present Illness: This is a 60year old white male who presented to the ED s/p fall at home while using a walker. Patient c/o moderate back pain, bilateral radicular pain following S1, and proximal leg weakness. Patient states the symptoms have become acutely worse over the last week. C/o n/t in the feet, states he is unable to feel his feet and legs when he walks. Denies loss of bowel or bladder function. Denies trying PT and Pain management. OR yesterday with Ortho for ORIF of left lateral malleolus. Allergies: Ancef [cefazolin]    Past Medical History:      Diagnosis Date    COPD (chronic obstructive pulmonary disease) ([address]     Osteoarthritis     Sleep apnea     Spinal stenosis        Surgical History:      Procedure Laterality Date    HERNIA REPAIR      NOSE SURGERY      TYMPANOSTOMY TUBE PLACEMENT      UVULECTOMY         Social History:   reports that he quit smoking about 4 years ago. His smoking use included Cigars and Cigarettes. He has a 78.00 pack-year smoking history. He has never used smokeless tobacco.   reports that he drinks alcohol.     Family History:      Problem Relation Age of Onset    Cirrhosis Father     Lupus Sister     Cancer Mother     Other Mother      copd    High Blood Pressure Brother        Review of Systems:  Denies fever, chills, or night sweats  Denies headache, dizziness, syncope  Denies blurred vision, double vision  Denies chest pain, palpitations, SOB  Denies diarrhea, constipation, n/v  Denies dysuria, hematuria  Denies recent infections  Denies easy bruising  Denies anxiety, depression    Physical Exam:  WDWN, resting comfortable, no apparent distress  Appears stated age  Vitals stable  Non-labored breathing   A&O x 3, normal affect Head is normocephalic, atraumatic   No palpable lymphadenopathy   Abdomen soft, nontender  Pupils equal and reactive, no scleral icterus  EOMI bilaterally  Cranial nerves II-XII intact bilaterally  No drift  5/5 in BUE  4/5 in BLE, limited in LLE due to casting   Decreased sensation over L4, L5, and S1 bilaterally   Toes going down  Skin warm and dry    Review of Imaging: [facility] lumbar spine [facility]    Assessment: Patient with lumbar stenosis, degenerative changes and scoliosis. Recent ORIF of left leg with Ortho. Plan:  -MRI findings discussed with patient--due to recent ORIF and NWB status, it is not a realistic option for lumbar surgery as he will not be able to participate in the appropriate post-operative care/expectations for maximum results. -Recommending PT/rehab placement, may perform core/back and LE exercises from [facility] POV  -Can also try MEENA in the interim  -F/U once cleared with Ortho as an outpatient    I have interviewed and examined the patient and agree with above. He has lumbar stenosis.  Follow up in the office in 4 weeks after ortho surgery    Andrew Dill

## 2018-05-01 LAB — MRSA CULTURE ONLY: NORMAL

## 2018-05-01 PROCEDURE — 97530 THERAPEUTIC ACTIVITIES: CPT

## 2018-05-01 PROCEDURE — 1200000000 HC SEMI PRIVATE

## 2018-05-01 PROCEDURE — 6360000002 HC RX W HCPCS: Performed by: FAMILY MEDICINE

## 2018-05-01 PROCEDURE — 6370000000 HC RX 637 (ALT 250 FOR IP): Performed by: ORTHOPAEDIC SURGERY

## 2018-05-01 PROCEDURE — 2580000003 HC RX 258: Performed by: FAMILY MEDICINE

## 2018-05-01 RX ADMIN — HYDROCODONE BITARTRATE AND ACETAMINOPHEN 1 TABLET: 5; 325 TABLET ORAL at 03:09

## 2018-05-01 RX ADMIN — HYDROCODONE BITARTRATE AND ACETAMINOPHEN 1 TABLET: 5; 325 TABLET ORAL at 12:00

## 2018-05-01 RX ADMIN — Medication 10 ML: at 20:20

## 2018-05-01 RX ADMIN — ASPIRIN 81 MG: 81 TABLET, COATED ORAL at 08:25

## 2018-05-01 RX ADMIN — HYDROCODONE BITARTRATE AND ACETAMINOPHEN 1 TABLET: 5; 325 TABLET ORAL at 20:20

## 2018-05-01 RX ADMIN — ENOXAPARIN SODIUM 40 MG: 40 INJECTION, SOLUTION INTRAVENOUS; SUBCUTANEOUS at 08:25

## 2018-05-01 RX ADMIN — Medication 10 ML: at 08:26

## 2018-05-01 ASSESSMENT — PAIN DESCRIPTION - PROGRESSION
CLINICAL_PROGRESSION: GRADUALLY WORSENING
CLINICAL_PROGRESSION: GRADUALLY WORSENING

## 2018-05-01 ASSESSMENT — PAIN SCALES - GENERAL
PAINLEVEL_OUTOF10: 6
PAINLEVEL_OUTOF10: 7
PAINLEVEL_OUTOF10: 0
PAINLEVEL_OUTOF10: 0
PAINLEVEL_OUTOF10: 6

## 2018-05-01 ASSESSMENT — PAIN DESCRIPTION - LOCATION
LOCATION: ANKLE
LOCATION: ANKLE

## 2018-05-01 ASSESSMENT — PAIN DESCRIPTION - DESCRIPTORS
DESCRIPTORS: ACHING;DISCOMFORT;SORE
DESCRIPTORS: ACHING;DISCOMFORT;SORE

## 2018-05-01 ASSESSMENT — PAIN DESCRIPTION - ONSET
ONSET: ON-GOING
ONSET: ON-GOING

## 2018-05-01 ASSESSMENT — PAIN DESCRIPTION - FREQUENCY
FREQUENCY: CONTINUOUS
FREQUENCY: CONTINUOUS

## 2018-05-01 ASSESSMENT — PAIN DESCRIPTION - ORIENTATION
ORIENTATION: LEFT
ORIENTATION: LEFT

## 2018-05-01 ASSESSMENT — PAIN DESCRIPTION - PAIN TYPE
TYPE: ACUTE PAIN
TYPE: ACUTE PAIN

## 2018-05-01 NOTE — PROGRESS NOTES
[facility] Quality Flow/Interdisciplinary Rounds Progress Note        Quality Flow Rounds held on May 1, 2018    Disciplines Attending:  Bedside Nurse, ,  and Nursing Unit Leadership    Margie Sabillon was admitted on 4/29/2018  2:46 AM    Anticipated Discharge Date:       Disposition:    Robert Score:  Robert Scale Score: 20    Readmission Risk              Risk of Unplanned Readmission:        8             Discussed patient goal for the day, patient clinical progression, and barriers to discharge.   The following Goal(s) of the Day/Commitment(s) have been identified:  Discharge [address] [place]  May 1, 2018

## 2018-05-02 VITALS
HEIGHT: 74 IN | SYSTOLIC BLOOD PRESSURE: 138 MMHG | WEIGHT: 291 LBS | OXYGEN SATURATION: 95 % | BODY MASS INDEX: 37.35 KG/M2 | DIASTOLIC BLOOD PRESSURE: 87 MMHG | HEART RATE: 75 BPM | RESPIRATION RATE: 18 BRPM | TEMPERATURE: 97.9 F

## 2018-05-02 PROCEDURE — 6370000000 HC RX 637 (ALT 250 FOR IP): Performed by: ORTHOPAEDIC SURGERY

## 2018-05-02 PROCEDURE — 97530 THERAPEUTIC ACTIVITIES: CPT

## 2018-05-02 PROCEDURE — 6360000002 HC RX W HCPCS: Performed by: FAMILY MEDICINE

## 2018-05-02 PROCEDURE — 2580000003 HC RX 258: Performed by: FAMILY MEDICINE

## 2018-05-02 RX ORDER — ERGOCALCIFEROL (VITAMIN D2) 1250 MCG
50000 CAPSULE ORAL WEEKLY
Qty: 4 CAPSULE | Refills: 2 | Status: SHIPPED | OUTPATIENT
Start: 2018-05-06 | End: 2018-06-25

## 2018-05-02 RX ADMIN — ASPIRIN 81 MG: 81 TABLET, COATED ORAL at 08:46

## 2018-05-02 RX ADMIN — ENOXAPARIN SODIUM 40 MG: 40 INJECTION, SOLUTION INTRAVENOUS; SUBCUTANEOUS at 08:47

## 2018-05-02 RX ADMIN — HYDROCODONE BITARTRATE AND ACETAMINOPHEN 1 TABLET: 5; 325 TABLET ORAL at 11:09

## 2018-05-02 RX ADMIN — Medication 10 ML: at 08:48

## 2018-05-02 RX ADMIN — HYDROCODONE BITARTRATE AND ACETAMINOPHEN 1 TABLET: 5; 325 TABLET ORAL at 03:49

## 2018-05-02 ASSESSMENT — PAIN SCALES - GENERAL
PAINLEVEL_OUTOF10: 5
PAINLEVEL_OUTOF10: 5
PAINLEVEL_OUTOF10: 0

## 2018-05-02 ASSESSMENT — PAIN DESCRIPTION - PAIN TYPE: TYPE: ACUTE PAIN

## 2018-05-02 ASSESSMENT — PAIN DESCRIPTION - ONSET: ONSET: ON-GOING

## 2018-05-02 ASSESSMENT — PAIN DESCRIPTION - FREQUENCY: FREQUENCY: CONTINUOUS

## 2018-05-02 ASSESSMENT — PAIN DESCRIPTION - LOCATION: LOCATION: ANKLE

## 2018-05-02 ASSESSMENT — PAIN DESCRIPTION - DESCRIPTORS: DESCRIPTORS: ACHING;DISCOMFORT;DULL

## 2018-05-02 ASSESSMENT — PAIN DESCRIPTION - PROGRESSION: CLINICAL_PROGRESSION: GRADUALLY WORSENING

## 2018-05-02 ASSESSMENT — PAIN DESCRIPTION - ORIENTATION: ORIENTATION: LEFT

## 2018-05-04 PROBLEM — M48.061 SPINAL STENOSIS OF LUMBAR REGION WITHOUT NEUROGENIC CLAUDICATION: Status: ACTIVE | Noted: 2018-05-04

## 2018-05-11 PROBLEM — M54.41 MIDLINE LOW BACK PAIN WITH BILATERAL SCIATICA: Status: ACTIVE | Noted: 2018-05-11

## 2018-05-11 PROBLEM — M54.42 MIDLINE LOW BACK PAIN WITH BILATERAL SCIATICA: Status: ACTIVE | Noted: 2018-05-11

## 2018-06-08 ENCOUNTER — OFFICE VISIT (OUTPATIENT)
Dept: NEUROSURGERY | Age: 63
End: 2018-06-08
Payer: COMMERCIAL

## 2018-06-08 VITALS — SYSTOLIC BLOOD PRESSURE: 143 MMHG | HEART RATE: 59 BPM | HEIGHT: 74 IN | DIASTOLIC BLOOD PRESSURE: 89 MMHG

## 2018-06-08 DIAGNOSIS — M48.062 LUMBAR STENOSIS WITH NEUROGENIC CLAUDICATION: Primary | ICD-10-CM

## 2018-06-08 PROCEDURE — 99213 OFFICE O/P EST LOW 20 MIN: CPT | Performed by: NEUROLOGICAL SURGERY

## 2018-06-08 PROCEDURE — G8417 CALC BMI ABV UP PARAM F/U: HCPCS | Performed by: NEUROLOGICAL SURGERY

## 2018-06-08 PROCEDURE — 1036F TOBACCO NON-USER: CPT | Performed by: NEUROLOGICAL SURGERY

## 2018-06-08 PROCEDURE — G8427 DOCREV CUR MEDS BY ELIG CLIN: HCPCS | Performed by: NEUROLOGICAL SURGERY

## 2018-06-08 PROCEDURE — 3017F COLORECTAL CA SCREEN DOC REV: CPT | Performed by: NEUROLOGICAL SURGERY

## 2018-06-19 ENCOUNTER — PREP FOR PROCEDURE (OUTPATIENT)
Dept: NEUROSURGERY | Age: 63
End: 2018-06-19

## 2018-06-19 DIAGNOSIS — M48.061 DEGENERATIVE LUMBAR SPINAL STENOSIS: Primary | ICD-10-CM

## 2018-06-19 RX ORDER — SODIUM CHLORIDE 0.9 % (FLUSH) 0.9 %
10 SYRINGE (ML) INJECTION EVERY 12 HOURS SCHEDULED
Status: CANCELLED | OUTPATIENT
Start: 2018-06-19

## 2018-06-19 RX ORDER — SODIUM CHLORIDE 0.9 % (FLUSH) 0.9 %
10 SYRINGE (ML) INJECTION PRN
Status: CANCELLED | OUTPATIENT
Start: 2018-06-19

## 2018-06-19 RX ORDER — SODIUM CHLORIDE 9 MG/ML
INJECTION, SOLUTION INTRAVENOUS CONTINUOUS
Status: CANCELLED | OUTPATIENT
Start: 2018-06-19

## 2018-06-25 ENCOUNTER — HOSPITAL ENCOUNTER (OUTPATIENT)
Dept: PREADMISSION TESTING | Age: 63
Discharge: HOME | End: 2018-06-25
Payer: COMMERCIAL

## 2018-06-25 ENCOUNTER — HOSPITAL ENCOUNTER (OUTPATIENT)
Dept: GENERAL RADIOLOGY | Age: 63
Discharge: HOME | End: 2018-06-27
Payer: COMMERCIAL

## 2018-06-25 VITALS
HEART RATE: 67 BPM | WEIGHT: 315 LBS | HEIGHT: 74 IN | DIASTOLIC BLOOD PRESSURE: 78 MMHG | SYSTOLIC BLOOD PRESSURE: 151 MMHG | RESPIRATION RATE: 22 BRPM | BODY MASS INDEX: 40.43 KG/M2 | TEMPERATURE: 98.3 F | OXYGEN SATURATION: 94 %

## 2018-06-25 DIAGNOSIS — Z01.818 PREOPERATIVE EXAMINATION: Primary | ICD-10-CM

## 2018-06-25 DIAGNOSIS — M48.061 DEGENERATIVE LUMBAR SPINAL STENOSIS: ICD-10-CM

## 2018-06-25 LAB
ABO/RH: NORMAL
ALBUMIN SERPL-MCNC: 3.9 G/DL (ref 3.5–5.2)
ALP BLD-CCNC: 95 U/L (ref 40–129)
ALT SERPL-CCNC: 32 U/L (ref 0–40)
ANION GAP SERPL CALCULATED.3IONS-SCNC: 12 MMOL/L (ref 7–16)
ANTIBODY SCREEN: NORMAL
APTT: 28.8 SEC (ref 24.5–35.1)
AST SERPL-CCNC: 21 U/L (ref 0–39)
BASOPHILS ABSOLUTE: 0.07 E9/L (ref 0–0.2)
BASOPHILS RELATIVE PERCENT: 1 % (ref 0–2)
BILIRUB SERPL-MCNC: 0.4 MG/DL (ref 0–1.2)
BILIRUBIN URINE: NEGATIVE
BLOOD, URINE: NEGATIVE
BUN BLDV-MCNC: 16 MG/DL (ref 8–23)
CALCIUM SERPL-MCNC: 9.2 MG/DL (ref 8.6–10.2)
CHLORIDE BLD-SCNC: 102 MMOL/L (ref 98–107)
CLARITY: CLEAR
CO2: 25 MMOL/L (ref 22–29)
COLOR: YELLOW
CREAT SERPL-MCNC: 0.8 MG/DL (ref 0.7–1.2)
EKG ATRIAL RATE: 61 BPM
EKG P AXIS: 42 DEGREES
EKG P-R INTERVAL: 180 MS
EKG Q-T INTERVAL: 422 MS
EKG QRS DURATION: 86 MS
EKG QTC CALCULATION (BAZETT): 424 MS
EKG R AXIS: 12 DEGREES
EKG T AXIS: 35 DEGREES
EKG VENTRICULAR RATE: 61 BPM
EOSINOPHILS ABSOLUTE: 0.32 E9/L (ref 0.05–0.5)
EOSINOPHILS RELATIVE PERCENT: 4.5 % (ref 0–6)
GFR AFRICAN AMERICAN: >60
GFR NON-AFRICAN AMERICAN: >60 ML/MIN/1.73
GLUCOSE BLD-MCNC: 97 MG/DL (ref 74–109)
GLUCOSE URINE: NEGATIVE MG/DL
HCT VFR BLD CALC: 43.9 % (ref 37–54)
HEMOGLOBIN: 14.6 G/DL (ref 12.5–16.5)
IMMATURE GRANULOCYTES #: 0.03 E9/L
IMMATURE GRANULOCYTES %: 0.4 % (ref 0–5)
INR BLD: 0.9
KETONES, URINE: NEGATIVE MG/DL
LEUKOCYTE ESTERASE, URINE: NEGATIVE
LYMPHOCYTES ABSOLUTE: 2.18 E9/L (ref 1.5–4)
LYMPHOCYTES RELATIVE PERCENT: 30.9 % (ref 20–42)
MCH RBC QN AUTO: 31.5 PG (ref 26–35)
MCHC RBC AUTO-ENTMCNC: 33.3 % (ref 32–34.5)
MCV RBC AUTO: 94.8 FL (ref 80–99.9)
MONOCYTES ABSOLUTE: 0.76 E9/L (ref 0.1–0.95)
MONOCYTES RELATIVE PERCENT: 10.8 % (ref 2–12)
NEUTROPHILS ABSOLUTE: 3.69 E9/L (ref 1.8–7.3)
NEUTROPHILS RELATIVE PERCENT: 52.4 % (ref 43–80)
NITRITE, URINE: NEGATIVE
PDW BLD-RTO: 14.6 FL (ref 11.5–15)
PH UA: 5.5 (ref 5–9)
PLATELET # BLD: 226 E9/L (ref 130–450)
PMV BLD AUTO: 9.9 FL (ref 7–12)
POTASSIUM REFLEX MAGNESIUM: 4.7 MMOL/L (ref 3.5–5)
PROTEIN UA: NEGATIVE MG/DL
PROTHROMBIN TIME: 10.3 SEC (ref 9.3–12.4)
RBC # BLD: 4.63 E12/L (ref 3.8–5.8)
SODIUM BLD-SCNC: 139 MMOL/L (ref 132–146)
SPECIFIC GRAVITY UA: 1.02 (ref 1–1.03)
TOTAL PROTEIN: 6.9 G/DL (ref 6.4–8.3)
UROBILINOGEN, URINE: 0.2 E.U./DL
WBC # BLD: 7.1 E9/L (ref 4.5–11.5)

## 2018-06-25 PROCEDURE — 71046 X-RAY EXAM CHEST 2 VIEWS: CPT

## 2018-06-25 PROCEDURE — 36415 COLL VENOUS BLD VENIPUNCTURE: CPT

## 2018-06-25 PROCEDURE — 87088 URINE BACTERIA CULTURE: CPT

## 2018-06-25 PROCEDURE — 86900 BLOOD TYPING SEROLOGIC ABO: CPT

## 2018-06-25 PROCEDURE — 85730 THROMBOPLASTIN TIME PARTIAL: CPT

## 2018-06-25 PROCEDURE — 85610 PROTHROMBIN TIME: CPT

## 2018-06-25 PROCEDURE — 86850 RBC ANTIBODY SCREEN: CPT

## 2018-06-25 PROCEDURE — 80053 COMPREHEN METABOLIC PANEL: CPT

## 2018-06-25 PROCEDURE — 93005 ELECTROCARDIOGRAM TRACING: CPT | Performed by: PHYSICIAN ASSISTANT

## 2018-06-25 PROCEDURE — 93010 ELECTROCARDIOGRAM REPORT: CPT | Performed by: INTERNAL MEDICINE

## 2018-06-25 PROCEDURE — 85025 COMPLETE CBC W/AUTO DIFF WBC: CPT

## 2018-06-25 PROCEDURE — 86901 BLOOD TYPING SEROLOGIC RH(D): CPT

## 2018-06-25 PROCEDURE — 81003 URINALYSIS AUTO W/O SCOPE: CPT

## 2018-06-25 RX ORDER — UMECLIDINIUM BROMIDE AND VILANTEROL TRIFENATATE 62.5; 25 UG/1; UG/1
1 POWDER RESPIRATORY (INHALATION) DAILY
COMMUNITY
Start: 2018-05-14 | End: 2018-11-10 | Stop reason: SDUPTHER

## 2018-06-27 LAB — URINE CULTURE, ROUTINE: NORMAL

## 2018-07-01 NOTE — H&P
History of Present Illness: This is a 60year old white male who presents with back and bilateral leg pain. Patient c/o moderate back pain, bilateral radicular pain following S1, and proximal leg weakness. Patient states the symptoms have become progressively worse. C/o n/t in the feet, states he is unable to feel his feet and legs when he walks. Denies loss of bowel or bladder function. Denies trying PT and Pain management.    Allergies: Ancef [cefazolin]     Past Medical History:  Past Medical History             Diagnosis Date    COPD (chronic obstructive pulmonary disease) (HCC)      Osteoarthritis      Sleep apnea      Spinal stenosis              Surgical History:  Past Surgical History             Procedure Laterality Date    HERNIA REPAIR        NOSE SURGERY        TYMPANOSTOMY TUBE PLACEMENT        UVULECTOMY                Social History:   reports that he quit smoking about 4 years ago. His smoking use included Cigars and Cigarettes. He has a 78.00 pack-year smoking history.  He has never used smokeless tobacco.   reports that he drinks alcohol.     Family History:  Family History              Problem Relation Age of Onset    Cirrhosis Father      Lupus Sister      Cancer Mother      Other Mother         copd    High Blood Pressure Brother              Review of Systems:  Denies fever, chills, or night sweats  Positive for back pain  Positive for leg pain  Positive for leg numbness  Positive for leg weakness  Negative for chest pain  Negative for SOB  Negative for depression  Negative for anxiety  Negative for stroke  Negative for seizure     Physical Exam:  WDWN, resting comfortable, no apparent distress  Appears stated age  Vitals stable  Non-labored breathing   A&O x 3, normal affect   Head is normocephalic, atraumatic   No palpable lymphadenopathy   Abdomen soft, nontender  Pupils equal and reactive, no scleral icterus  EOMI bilaterally  Cranial nerves II-XII intact bilaterally  No drift  5/5 in BUE  4/5 in BLE, limited in LLE due to casting   Decreased sensation over L4, L5, and S1 bilaterally   Toes going down  Skin warm and dry     Review of Imaging: MRI of lumbar spine reviewed from [facility]     Assessment: Patient with lumbar stenosis, from L3-S1.      Plan:  He has stenosis from L3-S1. I am recommending an L3-S1 laminectomy.  R/B/A of surgery have been discussed and he wishes to proceed with surgery

## 2018-07-02 ENCOUNTER — ANESTHESIA EVENT (OUTPATIENT)
Dept: OPERATING ROOM | Age: 63
End: 2018-07-02
Payer: COMMERCIAL

## 2018-07-02 ENCOUNTER — APPOINTMENT (OUTPATIENT)
Dept: GENERAL RADIOLOGY | Age: 63
End: 2018-07-02
Attending: NEUROLOGICAL SURGERY
Payer: COMMERCIAL

## 2018-07-02 ENCOUNTER — HOSPITAL ENCOUNTER (OUTPATIENT)
Age: 63
Setting detail: OBSERVATION
Discharge: HOME | End: 2018-07-06
Attending: NEUROLOGICAL SURGERY | Admitting: NEUROLOGICAL SURGERY
Payer: COMMERCIAL

## 2018-07-02 ENCOUNTER — ANESTHESIA (OUTPATIENT)
Dept: OPERATING ROOM | Age: 63
End: 2018-07-02
Payer: COMMERCIAL

## 2018-07-02 VITALS
OXYGEN SATURATION: 99 % | RESPIRATION RATE: 13 BRPM | SYSTOLIC BLOOD PRESSURE: 165 MMHG | DIASTOLIC BLOOD PRESSURE: 97 MMHG

## 2018-07-02 DIAGNOSIS — M48.061 DEGENERATIVE LUMBAR SPINAL STENOSIS: ICD-10-CM

## 2018-07-02 LAB
ALBUMIN SERPL-MCNC: 4.1 G/DL (ref 3.5–5.2)
ALP BLD-CCNC: 104 U/L (ref 40–129)
ALT SERPL-CCNC: 33 U/L (ref 0–40)
ANION GAP SERPL CALCULATED.3IONS-SCNC: 13 MMOL/L (ref 7–16)
AST SERPL-CCNC: 26 U/L (ref 0–39)
BILIRUB SERPL-MCNC: 0.5 MG/DL (ref 0–1.2)
BUN BLDV-MCNC: 13 MG/DL (ref 8–23)
CALCIUM SERPL-MCNC: 9.4 MG/DL (ref 8.6–10.2)
CHLORIDE BLD-SCNC: 104 MMOL/L (ref 98–107)
CO2: 24 MMOL/L (ref 22–29)
CREAT SERPL-MCNC: 0.8 MG/DL (ref 0.7–1.2)
GFR AFRICAN AMERICAN: >60
GFR NON-AFRICAN AMERICAN: >60 ML/MIN/1.73
GLUCOSE BLD-MCNC: 96 MG/DL (ref 74–109)
POTASSIUM SERPL-SCNC: 4.9 MMOL/L (ref 3.5–5)
SODIUM BLD-SCNC: 141 MMOL/L (ref 132–146)
TOTAL PROTEIN: 7.3 G/DL (ref 6.4–8.3)

## 2018-07-02 PROCEDURE — 2500000003 HC RX 250 WO HCPCS: Performed by: NEUROLOGICAL SURGERY

## 2018-07-02 PROCEDURE — 2700000000 HC OXYGEN THERAPY PER DAY

## 2018-07-02 PROCEDURE — 2580000003 HC RX 258: Performed by: PHYSICIAN ASSISTANT

## 2018-07-02 PROCEDURE — 63047 LAM FACETEC & FORAMOT LUMBAR: CPT | Performed by: NEUROLOGICAL SURGERY

## 2018-07-02 PROCEDURE — 6360000002 HC RX W HCPCS

## 2018-07-02 PROCEDURE — 80053 COMPREHEN METABOLIC PANEL: CPT

## 2018-07-02 PROCEDURE — 2720000010 HC SURG SUPPLY STERILE: Performed by: NEUROLOGICAL SURGERY

## 2018-07-02 PROCEDURE — 2500000003 HC RX 250 WO HCPCS: Performed by: NURSE ANESTHETIST, CERTIFIED REGISTERED

## 2018-07-02 PROCEDURE — 2580000003 HC RX 258

## 2018-07-02 PROCEDURE — 6360000002 HC RX W HCPCS: Performed by: NEUROLOGICAL SURGERY

## 2018-07-02 PROCEDURE — 3600000014 HC SURGERY LEVEL 4 ADDTL 15MIN: Performed by: NEUROLOGICAL SURGERY

## 2018-07-02 PROCEDURE — 6360000002 HC RX W HCPCS: Performed by: ANESTHESIOLOGY

## 2018-07-02 PROCEDURE — 3209999900 FLUORO FOR SURGICAL PROCEDURES

## 2018-07-02 PROCEDURE — 2709999900 HC NON-CHARGEABLE SUPPLY: Performed by: NEUROLOGICAL SURGERY

## 2018-07-02 PROCEDURE — 6370000000 HC RX 637 (ALT 250 FOR IP): Performed by: NEUROLOGICAL SURGERY

## 2018-07-02 PROCEDURE — 36415 COLL VENOUS BLD VENIPUNCTURE: CPT

## 2018-07-02 PROCEDURE — 3600000004 HC SURGERY LEVEL 4 BASE: Performed by: NEUROLOGICAL SURGERY

## 2018-07-02 PROCEDURE — 3700000001 HC ADD 15 MINUTES (ANESTHESIA): Performed by: NEUROLOGICAL SURGERY

## 2018-07-02 PROCEDURE — 2580000003 HC RX 258: Performed by: NEUROLOGICAL SURGERY

## 2018-07-02 PROCEDURE — 63048 LAM FACETEC &FORAMOT EA ADDL: CPT | Performed by: NEUROLOGICAL SURGERY

## 2018-07-02 PROCEDURE — 3700000000 HC ANESTHESIA ATTENDED CARE: Performed by: NEUROLOGICAL SURGERY

## 2018-07-02 PROCEDURE — 7100000001 HC PACU RECOVERY - ADDTL 15 MIN: Performed by: NEUROLOGICAL SURGERY

## 2018-07-02 PROCEDURE — 6360000002 HC RX W HCPCS: Performed by: PHYSICIAN ASSISTANT

## 2018-07-02 PROCEDURE — 7100000000 HC PACU RECOVERY - FIRST 15 MIN: Performed by: NEUROLOGICAL SURGERY

## 2018-07-02 PROCEDURE — 2500000003 HC RX 250 WO HCPCS

## 2018-07-02 RX ORDER — GLYCOPYRROLATE 1 MG/5 ML
SYRINGE (ML) INTRAVENOUS PRN
Status: DISCONTINUED | OUTPATIENT
Start: 2018-07-02 | End: 2018-07-02 | Stop reason: SDUPTHER

## 2018-07-02 RX ORDER — METOCLOPRAMIDE HYDROCHLORIDE 5 MG/ML
10 INJECTION INTRAMUSCULAR; INTRAVENOUS EVERY 6 HOURS
Status: DISCONTINUED | OUTPATIENT
Start: 2018-07-02 | End: 2018-07-06 | Stop reason: HOSPADM

## 2018-07-02 RX ORDER — M-VIT,TX,IRON,MINS/CALC/FOLIC 27MG-0.4MG
1 TABLET ORAL DAILY
Status: DISCONTINUED | OUTPATIENT
Start: 2018-07-02 | End: 2018-07-06 | Stop reason: HOSPADM

## 2018-07-02 RX ORDER — SODIUM CHLORIDE 0.9 % (FLUSH) 0.9 %
10 SYRINGE (ML) INJECTION PRN
Status: DISCONTINUED | OUTPATIENT
Start: 2018-07-02 | End: 2018-07-02

## 2018-07-02 RX ORDER — ONDANSETRON 2 MG/ML
INJECTION INTRAMUSCULAR; INTRAVENOUS PRN
Status: DISCONTINUED | OUTPATIENT
Start: 2018-07-02 | End: 2018-07-02 | Stop reason: SDUPTHER

## 2018-07-02 RX ORDER — OXYCODONE HYDROCHLORIDE AND ACETAMINOPHEN 5; 325 MG/1; MG/1
1 TABLET ORAL
Status: DISCONTINUED | OUTPATIENT
Start: 2018-07-02 | End: 2018-07-02 | Stop reason: HOSPADM

## 2018-07-02 RX ORDER — MORPHINE SULFATE 4 MG/ML
4 INJECTION, SOLUTION INTRAMUSCULAR; INTRAVENOUS
Status: DISCONTINUED | OUTPATIENT
Start: 2018-07-02 | End: 2018-07-06 | Stop reason: HOSPADM

## 2018-07-02 RX ORDER — SENNA AND DOCUSATE SODIUM 50; 8.6 MG/1; MG/1
2 TABLET, FILM COATED ORAL DAILY PRN
Status: DISCONTINUED | OUTPATIENT
Start: 2018-07-02 | End: 2018-07-06 | Stop reason: HOSPADM

## 2018-07-02 RX ORDER — HYDROCODONE BITARTRATE AND ACETAMINOPHEN 5; 325 MG/1; MG/1
1 TABLET ORAL EVERY 4 HOURS PRN
Status: DISCONTINUED | OUTPATIENT
Start: 2018-07-02 | End: 2018-07-06 | Stop reason: HOSPADM

## 2018-07-02 RX ORDER — CYCLOBENZAPRINE HCL 10 MG
10 TABLET ORAL 3 TIMES DAILY PRN
Status: DISCONTINUED | OUTPATIENT
Start: 2018-07-02 | End: 2018-07-06 | Stop reason: HOSPADM

## 2018-07-02 RX ORDER — PROMETHAZINE HYDROCHLORIDE 25 MG/ML
6.25 INJECTION, SOLUTION INTRAMUSCULAR; INTRAVENOUS
Status: DISCONTINUED | OUTPATIENT
Start: 2018-07-02 | End: 2018-07-02 | Stop reason: HOSPADM

## 2018-07-02 RX ORDER — ALBUTEROL SULFATE 90 UG/1
2 AEROSOL, METERED RESPIRATORY (INHALATION) EVERY 4 HOURS PRN
Status: DISCONTINUED | OUTPATIENT
Start: 2018-07-02 | End: 2018-07-06 | Stop reason: HOSPADM

## 2018-07-02 RX ORDER — DEXAMETHASONE SODIUM PHOSPHATE 10 MG/ML
INJECTION INTRAMUSCULAR; INTRAVENOUS PRN
Status: DISCONTINUED | OUTPATIENT
Start: 2018-07-02 | End: 2018-07-02 | Stop reason: SDUPTHER

## 2018-07-02 RX ORDER — FENTANYL CITRATE 50 UG/ML
25 INJECTION, SOLUTION INTRAMUSCULAR; INTRAVENOUS EVERY 5 MIN PRN
Status: DISCONTINUED | OUTPATIENT
Start: 2018-07-02 | End: 2018-07-02 | Stop reason: HOSPADM

## 2018-07-02 RX ORDER — SODIUM CHLORIDE 9 MG/ML
INJECTION, SOLUTION INTRAVENOUS CONTINUOUS
Status: DISCONTINUED | OUTPATIENT
Start: 2018-07-02 | End: 2018-07-02

## 2018-07-02 RX ORDER — HYDROCODONE BITARTRATE AND ACETAMINOPHEN 5; 325 MG/1; MG/1
2 TABLET ORAL EVERY 4 HOURS PRN
Status: DISCONTINUED | OUTPATIENT
Start: 2018-07-02 | End: 2018-07-06 | Stop reason: HOSPADM

## 2018-07-02 RX ORDER — NEOSTIGMINE METHYLSULFATE 0.5 MG/ML
INJECTION, SOLUTION INTRAVENOUS PRN
Status: DISCONTINUED | OUTPATIENT
Start: 2018-07-02 | End: 2018-07-02 | Stop reason: SDUPTHER

## 2018-07-02 RX ORDER — ACETAMINOPHEN 325 MG/1
650 TABLET ORAL EVERY 4 HOURS PRN
Status: DISCONTINUED | OUTPATIENT
Start: 2018-07-02 | End: 2018-07-06 | Stop reason: HOSPADM

## 2018-07-02 RX ORDER — SODIUM CHLORIDE 9 MG/ML
INJECTION, SOLUTION INTRAVENOUS CONTINUOUS PRN
Status: DISCONTINUED | OUTPATIENT
Start: 2018-07-02 | End: 2018-07-02 | Stop reason: SDUPTHER

## 2018-07-02 RX ORDER — BUPIVACAINE HYDROCHLORIDE 2.5 MG/ML
INJECTION, SOLUTION EPIDURAL; INFILTRATION; INTRACAUDAL PRN
Status: DISCONTINUED | OUTPATIENT
Start: 2018-07-02 | End: 2018-07-02 | Stop reason: HOSPADM

## 2018-07-02 RX ORDER — FENTANYL CITRATE 50 UG/ML
INJECTION, SOLUTION INTRAMUSCULAR; INTRAVENOUS PRN
Status: DISCONTINUED | OUTPATIENT
Start: 2018-07-02 | End: 2018-07-02 | Stop reason: SDUPTHER

## 2018-07-02 RX ORDER — VANCOMYCIN HYDROCHLORIDE 500 MG/10ML
INJECTION, POWDER, LYOPHILIZED, FOR SOLUTION INTRAVENOUS PRN
Status: DISCONTINUED | OUTPATIENT
Start: 2018-07-02 | End: 2018-07-02 | Stop reason: HOSPADM

## 2018-07-02 RX ORDER — MIDAZOLAM HYDROCHLORIDE 1 MG/ML
INJECTION INTRAMUSCULAR; INTRAVENOUS PRN
Status: DISCONTINUED | OUTPATIENT
Start: 2018-07-02 | End: 2018-07-02 | Stop reason: SDUPTHER

## 2018-07-02 RX ORDER — SODIUM CHLORIDE 0.9 % (FLUSH) 0.9 %
10 SYRINGE (ML) INJECTION EVERY 12 HOURS SCHEDULED
Status: DISCONTINUED | OUTPATIENT
Start: 2018-07-02 | End: 2018-07-02

## 2018-07-02 RX ORDER — DIPHENHYDRAMINE HYDROCHLORIDE 50 MG/ML
12.5 INJECTION INTRAMUSCULAR; INTRAVENOUS
Status: DISCONTINUED | OUTPATIENT
Start: 2018-07-02 | End: 2018-07-02 | Stop reason: HOSPADM

## 2018-07-02 RX ORDER — ONDANSETRON 2 MG/ML
4 INJECTION INTRAMUSCULAR; INTRAVENOUS EVERY 6 HOURS PRN
Status: DISCONTINUED | OUTPATIENT
Start: 2018-07-02 | End: 2018-07-06 | Stop reason: HOSPADM

## 2018-07-02 RX ORDER — SODIUM CHLORIDE 0.9 % (FLUSH) 0.9 %
10 SYRINGE (ML) INJECTION PRN
Status: DISCONTINUED | OUTPATIENT
Start: 2018-07-02 | End: 2018-07-06 | Stop reason: HOSPADM

## 2018-07-02 RX ORDER — FENTANYL CITRATE 50 UG/ML
50 INJECTION, SOLUTION INTRAMUSCULAR; INTRAVENOUS EVERY 5 MIN PRN
Status: DISCONTINUED | OUTPATIENT
Start: 2018-07-02 | End: 2018-07-02 | Stop reason: HOSPADM

## 2018-07-02 RX ORDER — PROPOFOL 10 MG/ML
INJECTION, EMULSION INTRAVENOUS PRN
Status: DISCONTINUED | OUTPATIENT
Start: 2018-07-02 | End: 2018-07-02 | Stop reason: SDUPTHER

## 2018-07-02 RX ORDER — SODIUM CHLORIDE 0.9 % (FLUSH) 0.9 %
10 SYRINGE (ML) INJECTION EVERY 12 HOURS SCHEDULED
Status: DISCONTINUED | OUTPATIENT
Start: 2018-07-02 | End: 2018-07-06 | Stop reason: HOSPADM

## 2018-07-02 RX ORDER — LACTULOSE 10 G/15ML
20 SOLUTION ORAL 3 TIMES DAILY
Status: DISCONTINUED | OUTPATIENT
Start: 2018-07-02 | End: 2018-07-06 | Stop reason: HOSPADM

## 2018-07-02 RX ORDER — MEPERIDINE HYDROCHLORIDE 50 MG/ML
12.5 INJECTION INTRAMUSCULAR; INTRAVENOUS; SUBCUTANEOUS EVERY 5 MIN PRN
Status: DISCONTINUED | OUTPATIENT
Start: 2018-07-02 | End: 2018-07-02 | Stop reason: HOSPADM

## 2018-07-02 RX ORDER — ROCURONIUM BROMIDE 10 MG/ML
INJECTION, SOLUTION INTRAVENOUS PRN
Status: DISCONTINUED | OUTPATIENT
Start: 2018-07-02 | End: 2018-07-02 | Stop reason: SDUPTHER

## 2018-07-02 RX ORDER — LIDOCAINE HYDROCHLORIDE AND EPINEPHRINE 10; 10 MG/ML; UG/ML
INJECTION, SOLUTION INFILTRATION; PERINEURAL PRN
Status: DISCONTINUED | OUTPATIENT
Start: 2018-07-02 | End: 2018-07-02 | Stop reason: HOSPADM

## 2018-07-02 RX ORDER — MORPHINE SULFATE 2 MG/ML
2 INJECTION, SOLUTION INTRAMUSCULAR; INTRAVENOUS
Status: DISCONTINUED | OUTPATIENT
Start: 2018-07-02 | End: 2018-07-06 | Stop reason: HOSPADM

## 2018-07-02 RX ADMIN — NEOSTIGMINE METHYLSULFATE 3 MG: 0.5 INJECTION INTRAVENOUS at 16:30

## 2018-07-02 RX ADMIN — MORPHINE SULFATE 4 MG: 4 INJECTION INTRAVENOUS at 21:21

## 2018-07-02 RX ADMIN — ONDANSETRON 4 MG: 2 INJECTION INTRAMUSCULAR; INTRAVENOUS at 15:00

## 2018-07-02 RX ADMIN — ROCURONIUM BROMIDE 10 MG: 10 INJECTION, SOLUTION INTRAVENOUS at 14:51

## 2018-07-02 RX ADMIN — MULTIPLE VITAMINS W/ MINERALS TAB 1 TABLET: TAB at 21:21

## 2018-07-02 RX ADMIN — Medication 10 ML: at 21:22

## 2018-07-02 RX ADMIN — HYDROMORPHONE HYDROCHLORIDE 0.5 MG: 1 INJECTION, SOLUTION INTRAMUSCULAR; INTRAVENOUS; SUBCUTANEOUS at 17:45

## 2018-07-02 RX ADMIN — SODIUM CHLORIDE: 9 INJECTION, SOLUTION INTRAVENOUS at 15:50

## 2018-07-02 RX ADMIN — VANCOMYCIN HYDROCHLORIDE 2000 MG: 1 INJECTION, POWDER, LYOPHILIZED, FOR SOLUTION INTRAVENOUS at 13:47

## 2018-07-02 RX ADMIN — LACTULOSE 20 G: 20 SOLUTION ORAL at 21:19

## 2018-07-02 RX ADMIN — CYCLOBENZAPRINE 10 MG: 10 TABLET, FILM COATED ORAL at 23:43

## 2018-07-02 RX ADMIN — ROCURONIUM BROMIDE 50 MG: 10 INJECTION, SOLUTION INTRAVENOUS at 14:22

## 2018-07-02 RX ADMIN — FENTANYL CITRATE 50 MCG: 50 INJECTION, SOLUTION INTRAMUSCULAR; INTRAVENOUS at 15:55

## 2018-07-02 RX ADMIN — PROPOFOL 200 MG: 10 INJECTION, EMULSION INTRAVENOUS at 14:22

## 2018-07-02 RX ADMIN — OLODATEROL RESPIMAT INHALATION SPRAY 2 PUFF: 2.5 SPRAY, METERED RESPIRATORY (INHALATION) at 21:23

## 2018-07-02 RX ADMIN — DEXAMETHASONE SODIUM PHOSPHATE 10 MG: 10 INJECTION INTRAMUSCULAR; INTRAVENOUS at 14:25

## 2018-07-02 RX ADMIN — HYDROCODONE BITARTRATE AND ACETAMINOPHEN 2 TABLET: 5; 325 TABLET ORAL at 23:43

## 2018-07-02 RX ADMIN — SODIUM CHLORIDE: 9 INJECTION, SOLUTION INTRAVENOUS at 12:35

## 2018-07-02 RX ADMIN — LIDOCAINE HYDROCHLORIDE 100 MG: 20 INJECTION, SOLUTION INTRAVENOUS at 14:22

## 2018-07-02 RX ADMIN — FENTANYL CITRATE 50 MCG: 50 INJECTION, SOLUTION INTRAMUSCULAR; INTRAVENOUS at 17:20

## 2018-07-02 RX ADMIN — FENTANYL CITRATE 50 MCG: 50 INJECTION, SOLUTION INTRAMUSCULAR; INTRAVENOUS at 14:56

## 2018-07-02 RX ADMIN — HYDROMORPHONE HYDROCHLORIDE 0.5 MG: 1 INJECTION, SOLUTION INTRAMUSCULAR; INTRAVENOUS; SUBCUTANEOUS at 17:50

## 2018-07-02 RX ADMIN — FENTANYL CITRATE 50 MCG: 50 INJECTION, SOLUTION INTRAMUSCULAR; INTRAVENOUS at 17:25

## 2018-07-02 RX ADMIN — FENTANYL CITRATE 50 MCG: 50 INJECTION, SOLUTION INTRAMUSCULAR; INTRAVENOUS at 16:50

## 2018-07-02 RX ADMIN — METOCLOPRAMIDE 10 MG: 5 INJECTION, SOLUTION INTRAMUSCULAR; INTRAVENOUS at 21:22

## 2018-07-02 RX ADMIN — MIDAZOLAM 2 MG: 1 INJECTION INTRAMUSCULAR; INTRAVENOUS at 14:15

## 2018-07-02 RX ADMIN — TIOTROPIUM BROMIDE 18 MCG: 18 CAPSULE ORAL; RESPIRATORY (INHALATION) at 21:23

## 2018-07-02 RX ADMIN — Medication 0.6 MG: at 16:30

## 2018-07-02 RX ADMIN — ROCURONIUM BROMIDE 10 MG: 10 INJECTION, SOLUTION INTRAVENOUS at 14:56

## 2018-07-02 RX ADMIN — SODIUM CHLORIDE: 9 INJECTION, SOLUTION INTRAVENOUS at 14:13

## 2018-07-02 RX ADMIN — FENTANYL CITRATE 100 MCG: 50 INJECTION, SOLUTION INTRAMUSCULAR; INTRAVENOUS at 14:22

## 2018-07-02 ASSESSMENT — PULMONARY FUNCTION TESTS
PIF_VALUE: 24
PIF_VALUE: 26
PIF_VALUE: 26
PIF_VALUE: 27
PIF_VALUE: 26
PIF_VALUE: 24
PIF_VALUE: 26
PIF_VALUE: 27
PIF_VALUE: 26
PIF_VALUE: 27
PIF_VALUE: 24
PIF_VALUE: 24
PIF_VALUE: 25
PIF_VALUE: 26
PIF_VALUE: 26
PIF_VALUE: 27
PIF_VALUE: 25
PIF_VALUE: 26
PIF_VALUE: 27
PIF_VALUE: 27
PIF_VALUE: 25
PIF_VALUE: 25
PIF_VALUE: 26
PIF_VALUE: 25
PIF_VALUE: 26
PIF_VALUE: 0
PIF_VALUE: 27
PIF_VALUE: 20
PIF_VALUE: 26
PIF_VALUE: 24
PIF_VALUE: 27
PIF_VALUE: 27
PIF_VALUE: 26
PIF_VALUE: 25
PIF_VALUE: 27
PIF_VALUE: 23
PIF_VALUE: 27
PIF_VALUE: 26
PIF_VALUE: 24
PIF_VALUE: 26
PIF_VALUE: 24
PIF_VALUE: 27
PIF_VALUE: 25
PIF_VALUE: 5
PIF_VALUE: 24
PIF_VALUE: 17
PIF_VALUE: 0
PIF_VALUE: 24
PIF_VALUE: 27
PIF_VALUE: 26
PIF_VALUE: 31
PIF_VALUE: 26
PIF_VALUE: 26
PIF_VALUE: 27
PIF_VALUE: 24
PIF_VALUE: 25
PIF_VALUE: 26
PIF_VALUE: 26
PIF_VALUE: 4
PIF_VALUE: 15
PIF_VALUE: 25
PIF_VALUE: 27
PIF_VALUE: 25
PIF_VALUE: 2
PIF_VALUE: 24
PIF_VALUE: 26
PIF_VALUE: 2
PIF_VALUE: 25
PIF_VALUE: 27
PIF_VALUE: 26
PIF_VALUE: 2
PIF_VALUE: 26
PIF_VALUE: 25
PIF_VALUE: 26
PIF_VALUE: 25
PIF_VALUE: 26
PIF_VALUE: 4
PIF_VALUE: 6
PIF_VALUE: 26
PIF_VALUE: 24
PIF_VALUE: 7
PIF_VALUE: 26
PIF_VALUE: 25
PIF_VALUE: 17
PIF_VALUE: 26
PIF_VALUE: 19
PIF_VALUE: 25
PIF_VALUE: 23
PIF_VALUE: 0
PIF_VALUE: 26
PIF_VALUE: 25
PIF_VALUE: 29
PIF_VALUE: 3
PIF_VALUE: 27
PIF_VALUE: 22
PIF_VALUE: 26
PIF_VALUE: 26
PIF_VALUE: 25
PIF_VALUE: 26
PIF_VALUE: 4
PIF_VALUE: 27
PIF_VALUE: 0
PIF_VALUE: 27
PIF_VALUE: 26
PIF_VALUE: 26
PIF_VALUE: 24
PIF_VALUE: 27
PIF_VALUE: 26
PIF_VALUE: 26
PIF_VALUE: 47
PIF_VALUE: 24
PIF_VALUE: 28
PIF_VALUE: 25
PIF_VALUE: 18
PIF_VALUE: 26
PIF_VALUE: 25
PIF_VALUE: 24
PIF_VALUE: 22
PIF_VALUE: 26
PIF_VALUE: 25
PIF_VALUE: 24
PIF_VALUE: 25
PIF_VALUE: 26
PIF_VALUE: 26
PIF_VALUE: 25
PIF_VALUE: 27
PIF_VALUE: 26
PIF_VALUE: 24
PIF_VALUE: 25
PIF_VALUE: 25
PIF_VALUE: 27
PIF_VALUE: 25
PIF_VALUE: 15
PIF_VALUE: 31
PIF_VALUE: 26
PIF_VALUE: 0
PIF_VALUE: 26
PIF_VALUE: 24
PIF_VALUE: 25
PIF_VALUE: 25
PIF_VALUE: 26
PIF_VALUE: 24
PIF_VALUE: 27
PIF_VALUE: 0

## 2018-07-02 ASSESSMENT — PAIN DESCRIPTION - FREQUENCY: FREQUENCY: INTERMITTENT

## 2018-07-02 ASSESSMENT — PAIN DESCRIPTION - PROGRESSION: CLINICAL_PROGRESSION: NOT CHANGED

## 2018-07-02 ASSESSMENT — PAIN SCALES - GENERAL
PAINLEVEL_OUTOF10: 0
PAINLEVEL_OUTOF10: 10
PAINLEVEL_OUTOF10: 7
PAINLEVEL_OUTOF10: 0
PAINLEVEL_OUTOF10: 7
PAINLEVEL_OUTOF10: 10
PAINLEVEL_OUTOF10: 6
PAINLEVEL_OUTOF10: 4
PAINLEVEL_OUTOF10: 0
PAINLEVEL_OUTOF10: 7

## 2018-07-02 ASSESSMENT — PAIN DESCRIPTION - LOCATION
LOCATION: BACK

## 2018-07-02 ASSESSMENT — PAIN DESCRIPTION - PAIN TYPE
TYPE: SURGICAL PAIN

## 2018-07-02 ASSESSMENT — PAIN DESCRIPTION - DESCRIPTORS
DESCRIPTORS: CONSTANT;ACHING
DESCRIPTORS: CONSTANT;DISCOMFORT
DESCRIPTORS: ACHING
DESCRIPTORS: ACHING;DISCOMFORT
DESCRIPTORS: ACHING;PRESSURE;SORE
DESCRIPTORS: CONSTANT;DISCOMFORT

## 2018-07-02 ASSESSMENT — PAIN DESCRIPTION - ORIENTATION
ORIENTATION: LOWER
ORIENTATION: LOWER

## 2018-07-02 ASSESSMENT — PAIN - FUNCTIONAL ASSESSMENT: PAIN_FUNCTIONAL_ASSESSMENT: 0-10

## 2018-07-02 ASSESSMENT — PAIN DESCRIPTION - ONSET: ONSET: AWAKENED FROM SLEEP

## 2018-07-02 NOTE — ANESTHESIA PRE PROCEDURE
neurogenic claudication M48.061    Midline low back pain with bilateral sciatica M54.41, M54.42    Degenerative lumbar spinal stenosis M48.061       Past Medical History:        Diagnosis Date    COPD (chronic obstructive pulmonary disease) (HCC)     Osteoarthritis     Sleep apnea     REFUSES CPAP    Spinal stenosis        Past Surgical History:        Procedure Laterality Date    HERNIA REPAIR      NOSE SURGERY      OPEN TREATMENT FRACTURE DISTAL TIBIA FIBULA Left 4/29/2018    left ANKLE OPEN REDUCTION INTERNAL FIXATION [facility] Kashif [facility]ouser [facility] TYMPANOSTOMY TUBE PLACEMENT      UVULECTOMY         Social History:    Social History   Substance Use Topics    Smoking status: Former Smoker     Packs/day: 2.00     Years: 39.00     Types: Cigars, Cigarettes     Quit date: 1/25/2014    Smokeless tobacco: Never Used    Alcohol use Yes      Comment: 12 per day (AT LEAST)                                Counseling given: Not Answered      Vital Signs (Current):   Vitals:    07/02/18 1213   BP: (!) 160/77   Pulse: 68   Resp: 22   Temp: 98.8 °F (37.1 °C)   TempSrc: Temporal   SpO2: 93%   Weight: (!) 322 lb (146.1 kg)   Height: 6' 2\" (1.88 m)                                              BP Readings from Last 3 Encounters:   07/02/18 (!) 160/77   06/25/18 (!) 151/78   06/08/18 (!) 143/89       NPO Status: Time of last liquid consumption: 2300                        Time of last solid consumption: 2300                        Date of last liquid consumption: 07/01/18                        Date of last solid food consumption: 07/01/18    BMI:   Wt Readings from Last 3 Encounters:   07/02/18 (!) 322 lb (146.1 kg)   06/25/18 (!) 322 lb (146.1 kg)   05/02/18 291 lb (132 kg)     Body mass index is 41.34 kg/m².     CBC:   Lab Results   Component Value Date    WBC 7.1 06/25/2018    RBC 4.63 06/25/2018    HGB 14.6 06/25/2018    HCT 43.9 06/25/2018    MCV 94.8 06/25/2018    RDW 14.6 06/25/2018     anesthesia. He understands and wishes to proceed.)  Induction: intravenous. Anesthetic plan and risks discussed with patient. Plan discussed with CRNA.                   Gabino Agudelo MD   7/2/2018

## 2018-07-03 PROCEDURE — 97165 OT EVAL LOW COMPLEX 30 MIN: CPT

## 2018-07-03 PROCEDURE — G8978 MOBILITY CURRENT STATUS: HCPCS

## 2018-07-03 PROCEDURE — 96365 THER/PROPH/DIAG IV INF INIT: CPT

## 2018-07-03 PROCEDURE — 96375 TX/PRO/DX INJ NEW DRUG ADDON: CPT

## 2018-07-03 PROCEDURE — G8979 MOBILITY GOAL STATUS: HCPCS

## 2018-07-03 PROCEDURE — 6370000000 HC RX 637 (ALT 250 FOR IP): Performed by: NEUROLOGICAL SURGERY

## 2018-07-03 PROCEDURE — G0378 HOSPITAL OBSERVATION PER HR: HCPCS

## 2018-07-03 PROCEDURE — G8987 SELF CARE CURRENT STATUS: HCPCS

## 2018-07-03 PROCEDURE — 97530 THERAPEUTIC ACTIVITIES: CPT

## 2018-07-03 PROCEDURE — 96376 TX/PRO/DX INJ SAME DRUG ADON: CPT

## 2018-07-03 PROCEDURE — 97161 PT EVAL LOW COMPLEX 20 MIN: CPT

## 2018-07-03 PROCEDURE — 6360000002 HC RX W HCPCS: Performed by: NEUROLOGICAL SURGERY

## 2018-07-03 PROCEDURE — 2580000003 HC RX 258: Performed by: NEUROLOGICAL SURGERY

## 2018-07-03 PROCEDURE — G8988 SELF CARE GOAL STATUS: HCPCS

## 2018-07-03 RX ADMIN — HYDROCODONE BITARTRATE AND ACETAMINOPHEN 2 TABLET: 5; 325 TABLET ORAL at 04:46

## 2018-07-03 RX ADMIN — OLODATEROL RESPIMAT INHALATION SPRAY 2 PUFF: 2.5 SPRAY, METERED RESPIRATORY (INHALATION) at 09:45

## 2018-07-03 RX ADMIN — MORPHINE SULFATE 4 MG: 4 INJECTION INTRAVENOUS at 17:20

## 2018-07-03 RX ADMIN — STANDARDIZED SENNA CONCENTRATE AND DOCUSATE SODIUM 2 TABLET: 8.6; 5 TABLET, FILM COATED ORAL at 09:48

## 2018-07-03 RX ADMIN — Medication 10 ML: at 09:49

## 2018-07-03 RX ADMIN — LACTULOSE 20 G: 20 SOLUTION ORAL at 13:48

## 2018-07-03 RX ADMIN — Medication 10 ML: at 21:36

## 2018-07-03 RX ADMIN — VANCOMYCIN HYDROCHLORIDE 2000 MG: 10 INJECTION, POWDER, LYOPHILIZED, FOR SOLUTION INTRAVENOUS at 02:47

## 2018-07-03 RX ADMIN — METOCLOPRAMIDE 10 MG: 5 INJECTION, SOLUTION INTRAMUSCULAR; INTRAVENOUS at 02:47

## 2018-07-03 RX ADMIN — MULTIPLE VITAMINS W/ MINERALS TAB 1 TABLET: TAB at 09:47

## 2018-07-03 RX ADMIN — CYCLOBENZAPRINE 10 MG: 10 TABLET, FILM COATED ORAL at 19:30

## 2018-07-03 RX ADMIN — METOCLOPRAMIDE 10 MG: 5 INJECTION, SOLUTION INTRAMUSCULAR; INTRAVENOUS at 21:39

## 2018-07-03 RX ADMIN — MORPHINE SULFATE 4 MG: 4 INJECTION INTRAVENOUS at 21:36

## 2018-07-03 RX ADMIN — TIOTROPIUM BROMIDE 18 MCG: 18 CAPSULE ORAL; RESPIRATORY (INHALATION) at 09:43

## 2018-07-03 RX ADMIN — HYDROCODONE BITARTRATE AND ACETAMINOPHEN 2 TABLET: 5; 325 TABLET ORAL at 19:30

## 2018-07-03 RX ADMIN — CYCLOBENZAPRINE 10 MG: 10 TABLET, FILM COATED ORAL at 09:47

## 2018-07-03 RX ADMIN — METOCLOPRAMIDE 10 MG: 5 INJECTION, SOLUTION INTRAMUSCULAR; INTRAVENOUS at 16:42

## 2018-07-03 RX ADMIN — HYDROCODONE BITARTRATE AND ACETAMINOPHEN 2 TABLET: 5; 325 TABLET ORAL at 09:48

## 2018-07-03 RX ADMIN — METOCLOPRAMIDE 10 MG: 5 INJECTION, SOLUTION INTRAMUSCULAR; INTRAVENOUS at 09:49

## 2018-07-03 RX ADMIN — LACTULOSE 20 G: 20 SOLUTION ORAL at 21:35

## 2018-07-03 RX ADMIN — HYDROCODONE BITARTRATE AND ACETAMINOPHEN 2 TABLET: 5; 325 TABLET ORAL at 13:49

## 2018-07-03 RX ADMIN — MORPHINE SULFATE 4 MG: 4 INJECTION INTRAVENOUS at 01:32

## 2018-07-03 RX ADMIN — Medication 10 ML: at 16:43

## 2018-07-03 RX ADMIN — LACTULOSE 20 G: 20 SOLUTION ORAL at 09:47

## 2018-07-03 RX ADMIN — VANCOMYCIN HYDROCHLORIDE 2000 MG: 10 INJECTION, POWDER, LYOPHILIZED, FOR SOLUTION INTRAVENOUS at 13:48

## 2018-07-03 ASSESSMENT — PAIN DESCRIPTION - DESCRIPTORS
DESCRIPTORS: ACHING;DISCOMFORT
DESCRIPTORS: ACHING;DISCOMFORT
DESCRIPTORS: ACHING;DISCOMFORT;SORE
DESCRIPTORS: ACHING;DISCOMFORT;SORE

## 2018-07-03 ASSESSMENT — PAIN DESCRIPTION - PAIN TYPE
TYPE: SURGICAL PAIN

## 2018-07-03 ASSESSMENT — PAIN DESCRIPTION - LOCATION
LOCATION: BACK

## 2018-07-03 ASSESSMENT — PAIN SCALES - GENERAL
PAINLEVEL_OUTOF10: 7
PAINLEVEL_OUTOF10: 0
PAINLEVEL_OUTOF10: 4
PAINLEVEL_OUTOF10: 8
PAINLEVEL_OUTOF10: 7
PAINLEVEL_OUTOF10: 3
PAINLEVEL_OUTOF10: 7
PAINLEVEL_OUTOF10: 4
PAINLEVEL_OUTOF10: 0
PAINLEVEL_OUTOF10: 6
PAINLEVEL_OUTOF10: 7
PAINLEVEL_OUTOF10: 6
PAINLEVEL_OUTOF10: 8
PAINLEVEL_OUTOF10: 4

## 2018-07-03 ASSESSMENT — PAIN DESCRIPTION - FREQUENCY
FREQUENCY: INTERMITTENT
FREQUENCY: CONTINUOUS

## 2018-07-03 ASSESSMENT — PAIN DESCRIPTION - ORIENTATION
ORIENTATION: LOWER
ORIENTATION: LOWER

## 2018-07-03 ASSESSMENT — PAIN DESCRIPTION - ONSET
ONSET: ON-GOING
ONSET: AWAKENED FROM SLEEP

## 2018-07-03 ASSESSMENT — PAIN DESCRIPTION - PROGRESSION
CLINICAL_PROGRESSION: NOT CHANGED
CLINICAL_PROGRESSION: NOT CHANGED

## 2018-07-03 NOTE — PROGRESS NOTES
Patient dangled at the side of the bed for about 10 minutes. Patient also stood and took 5 steps at the side of the bed. 2 nurse assist and the patient tolerated the activity fairly well with little pain. Patient repositioned in bed and pain medication given.

## 2018-07-03 NOTE — PROGRESS NOTES
OCCUPATIONAL THERAPY INITIAL EVALUATION      Date:7/3/2018  Patient Name: Bonifacio Catalan  MRN: [id number]  : 1955  Room: [address] [place], [place] [address]     Evaluating OT: Alec Chau, OTR/L   License #  [id number]    Recommended Adaptive Equipment: bariatric wLEXUS brooke for LB ADLs     AM PAC ADL RAW SCORE -  16/24  G code:    Diagnosis:  LUMBAR STENOSIS   Surgery: L3- S1 LUMBAR LAMINECTOMY 18  Past Medical History:       Diagnosis Date    [facility] obstructive [facility]) ([facility] [address]     Osteoarthritis     Sleep apnea     REFUSES CPAP    Spinal stenosis      Precautions: falls, neutral spine (no BLT), WBAT L LE in CAM boot (L ankle ORIF with Dr. Brigido Sifuetnes 18), B hearing aides     Pt was agreeable to eval/treatment this day: yes    Home Living: Pt lives wife (states she is caregiver for mother, not home ) in a 1st floor apt. with 1 4\" stair to enter and no rails; bed/bath on main  Bathroom setup: tub/shower  Equipment owned: rollator, w/c, CAM boot    Prior Level of Function: Ind. with ADLs & wife assist with IADLs; using rollator for ambulation. Driving: active, but wife drives past few months  Occupation:     Pain Level: pt c/o 8/10 low back pain this session, Nursing informed    Cognition: oriented x 4; follows multi- step directions. Sensory:   Hearing: B hearing aides  Vision: wfl    Glasses: yes [x] no [] reading []      UE Assessment:  Hand Dominance: Right [x]  Left []     Strength ROM Additional Info:    RUE   4/5 wfl wfl  and wfl FMC/dexterity noted during ADL tasks     LUE 4/5 wfl wfl  and wfl FMC/dexterity noted during ADL tasks   Sensation: wfl B UEs  Tone: wfl BUEs  Edema: none noted B UEs    Functional Assessment:   Initial Eval Status  Comments   Feeding  Ind. Grooming  s/u    Upper Body Dressing SBA     Lower Body Dressing Max A    Bathing NT    Toileting  NT, urinary cath.     Bed Mobility  Log roll: Min A  Supine to Sit: Mod A  Sit to Supine: NT    Functional Transfers Sit <>Stand: Mod A of 2   SPT: Mod A with w.walker    Functional Mobility Mod A with w.walker few steps & Fair- tolerance      Sit balance: SBA  Stand balance: Mod A  Endurance/Activity tolerance: Fair- with lt. Ax. Comments: Upon arrival pt supine in bed, wife present throughout. At end of session pt seated in bedside chair, all needs met, Nursing notified,  with all devices within reach, all lines and tubes intact. Pt. Instructed RE: safe transfers/mobility, ADLs, role of OT, treatment plan, recs. , prec. Pt. Demonstrates good understanding. Pt. requires continued skilled OT services to address deficits in ADLs, bed mobility, functional transfers/mobility, balance, endurance, strength. Skilled Treatment: consisted of simple ADLs, bed mobility, functional transfers/mobility with focus on safety, technique, precautions/neutral spine. Assessment of current deficits   Functional mobility [x]  ADLs [x] Strength []  Cognition []  Functional transfers  [x] IADLs [x] Safety Awareness [x]  Endurance [x]  Fine Motor Coordination [] Balance [x] Vision/perception [] Sensation []   Gross Motor Coordination [] ROM []       Eval Complexity: low    Treatment frequency: 15-30 min. 4-5 days/wk.      Plan of Care:  ADL retraining [x]   Equipment needs [x]   Neuromuscular re-education []  Energy Conservation Techniques [x]  Functional Transfer training [x]  Patient and/or Family Education [x]  Functional Mobility training [x]  Environmental Modifications []  Cognitive re-training []    Compensatory techniques for ADLs [x]  Splinting Needs []   Positioning to improve overall function []  Other: []      Rehab Potential: fair/good    Patient / Family Goal: to return to PLOF    Short term goals  Time Frame: 4 days  Goal (1)  Pt. will increase grooming to Mod I  Goal (2)  Pt. will increase UB bathing & dressing to s/u/ Mod I  Goal (3)  Pt. will increase LB bathing & dressing to SBA with

## 2018-07-03 NOTE — PROGRESS NOTES
Vega pulled, DTV 4pm. Patient 96% on 2 liters of O2. Oxygen removed. Will re-check sats on room air.

## 2018-07-03 NOTE — OP NOTE
[address]                   [address] [email or web address]], [address] [place] [address]                                 OPERATIVE REPORT    PATIENT NAME: Yana Gandhi                     :        1955  MED REC NO:   [id number]                            ROOM:       [facility]  ACCOUNT NO:   [de-identified]                           ADMIT DATE: 2018  PROVIDER:     Gabriela Snyder MD    DATE OF PROCEDURE:  2018    PREOPERATIVE DIAGNOSIS:  Lumbar canal stenosis from L3 to S1.    POSTOPERATIVE DIAGNOSIS:  Lumbar canal stenosis from L3 to S1.    OPERATION PROCEDURE:  Bilateral L3, L4, L5, and S1 laminectomy, bilateral  L3-L4, L4-L5, and L5-S1 medial facetectomy and bilateral L3, L4, L5, and S1  foraminotomy. ANESTHESIA:  General endotracheal anesthesia. SURGEON:  Gabriela Snyder M.D.    ASSISTANT:  Claudeen Sires, D.O.    COMPLICATIONS:  None. ESTIMATED BLOOD LOSS:  150 mL. SPECIMEN:  None. OPERATIVE INDICATION:  The patient is a 59-year-old gentleman who presented  to the office with back pain that radiates into his legs with bilateral  lower extremity weakness. He had an MRI that showed that he had lumbar  canal stenosis from L3 to S1 and after risks, benefits and all alternatives  were discussed with the patient, it was determined that he would undergo  the above-mentioned procedure. OPERATIVE PROCEDURE:  The patient was brought into the operating room. A  time-out was performed where he was identified by his name, medical record  number, and the operative procedure which he was about to undergo. Next,  induction of generalized endotracheal anesthesia was then commenced. Upon  completion of induction of generalized endotracheal anesthesia, he received  preoperative antibiotics. Vega catheter was placed. He was then flipped  in prone position on a Lorayne Mighty table. All pressure points were padded.    His lumbosacral region was prepped and draped in the usual sterile fashion. After this was done, a #10 blade was used to make the skin incision. Monopolar cautery was used to dissect through the subcutaneous tissue. I  then proceeded to open up the lumbodorsal fascia sharply with a monopolar  cautery and I exposed the spinous processes at L3, L4, L5, and S1. After  this was done, I then proceeded to perform a subperiosteal dissection to  expose the bilateral lamina at L3, L4, L5, and S1. I used intraoperative  fluoroscopy to confirm that I was at the appropriate levels. I then placed  the self-retaining Zelpi retractors into the wound. Next, I used the  Leksell rongeur to bite up the spinous process at L3, L4, L5, and S1 and  after this was done, I then proceeded to use a small straight curette to  detach the ligamentum flavum from the undersurface of the L5 lamina and  superior aspect of the S1 lamina and once this was completed, I then used a  #4 Kerrison punch to start my central decompression. I performed the  bilateral L3, L4, L5, and S1 laminectomy. Once the laminectomies were  completed, I then used a #3 Kerrison punch to perform bilateral L3-L4,  L4-L5, and L5-S1 medial facetectomy. Next, I used a dental tooth to  palpate the neuro foramina. I used a #3 Kerrison punch to then perform  bilateral L3-L4, L4-L5, and L5-S1 foraminotomy. After this was done, I  inspected the wound for any evidence of bleeding. Adequate hemostasis was  obtained using both monopolar and bipolar cautery. I then irrigated the  wound copiously with saline and proceeded to then close the wound in layers  using 0 Vicryl for the fascia, 2-0 Vicryl for the subcutaneous layer, and  4-0 Monocryl in the subcuticular fashion for the skin. Dermabond was  applied over the skin surface. A dry sterile dressing was placed over  this. The patient was then flipped into supine position on his hospital  bed, was extubated, and transported to the postanesthesia care unit in  stable condition.

## 2018-07-03 NOTE — PROGRESS NOTES
Department of Neurosurgery  Physician Assistant Progress Note    CHIEF COMPLAINT: Patient POD 1 s/p L3-S1 laminectomy. SUBJECTIVE:  Patient states he is \"ok\". Denies much sleep. C/o mild back pain and some residual numbness in his toes. Family present with generalized questions.      ROS:  Denies chest pain, palpitations, or SOB  Denies fever, chills, or night sweats    OBJECTIVE  Physical  VITALS:  BP (!) 148/71   Pulse 72   Temp 98.7 °F (37.1 °C) (Temporal)   Resp 16   Ht 6' 2\" (1.88 m)   Wt (!) 322 lb (146.1 kg)   SpO2 95%   BMI 41.34 kg/m²    Awake and alert   No apparent distress   Non-labored breathing   FC x 4 ext   Sensory grossly intact   Incision covered    Data  CBC with Differential:    Lab Results   Component Value Date    WBC 7.1 06/25/2018    RBC 4.63 06/25/2018    HGB 14.6 06/25/2018    HCT 43.9 06/25/2018     06/25/2018    MCV 94.8 06/25/2018    MCH 31.5 06/25/2018    MCHC 33.3 06/25/2018    RDW 14.6 06/25/2018    LYMPHOPCT 30.9 06/25/2018    MONOPCT 10.8 06/25/2018    BASOPCT 1.0 06/25/2018    MONOSABS 0.76 06/25/2018    LYMPHSABS 2.18 06/25/2018    EOSABS 0.32 06/25/2018    BASOSABS 0.07 06/25/2018     Current Inpatient Medications  Current Facility-Administered Medications: therapeutic multivitamin-minerals 1 tablet, 1 tablet, Oral, Daily  albuterol sulfate  (90 Base) MCG/ACT inhaler 2 puff, 2 puff, Inhalation, Q4H PRN  sodium chloride flush 0.9 % injection 10 mL, 10 mL, Intravenous, 2 times per day  sodium chloride flush 0.9 % injection 10 mL, 10 mL, Intravenous, PRN  acetaminophen (TYLENOL) tablet 650 mg, 650 mg, Oral, Q4H PRN  ondansetron (ZOFRAN) injection 4 mg, 4 mg, Intravenous, Q6H PRN  vancomycin (VANCOCIN) 2,000 mg in dextrose 5 % 500 mL IVPB, 2,000 mg, Intravenous, Q12H  HYDROcodone-acetaminophen (NORCO) 5-325 MG per tablet 1 tablet, 1 tablet, Oral, Q4H PRN **OR** HYDROcodone-acetaminophen (NORCO) 5-325 MG per tablet 2 tablet, 2 tablet, Oral, Q4H PRN  morphine

## 2018-07-03 NOTE — PROGRESS NOTES
Physical Therapy  Initial Assessment     Name: Ifeoma Downey  : 1955  MRN: [id number]    Date of Service: 7/3/2018    Evaluating PT:  Ashlee Gonzalez, PT [id number]    Room #:  7759/4947-Y  Diagnosis:  DJD lumbar spine  Surgery: 18 s/p L3-S1 laminectomy  PMHx:        Diagnosis Date    [facility]chronic obstructive [facility] disease) ([facility] [address]     Osteoarthritis     Sleep apnea     REFUSES CPAP    Spinal stenosis      Precautions:  Spinal precautions. CAM boot LLE WBAT per wife and patient report. Equipment Needs:  none    Pt lives with wife in a 1st floor apartment with 1 stairs to enter and no rail. Bed is on 1 floor and bath is on 1 floor. Pt ambulated with Ind with use of rollator PTA. Equipment owned: rollator, manual w/c which will be returned shortly. Initial Evaluation  Date: 7/3/18 Treatment Short Term/ Long Term   Goals   AM-PAC 6 Clicks 66/13     Was pt agreeable to Eval/treatment? yes     Does pt have pain? 8/10     Bed Mobility  Rolling: Min A  Supine to sit: Mod A  Sit to supine: NT  Scooting: Mod A   Rolling: ind  Supine to sit: Ind  Sit to supine: Ind  Scooting: Ind   Transfers Sit to stand: Mod A x 2  Stand to sit: Mod A x 2  Stand pivot: Mod A x2  Sit to stand: Mod Ind  Stand to sit: Mod Ind  Stand pivot: Mod Ind   Ambulation    NT only SPT transfer this session. >150 with fww with Mod Ind. Stair negotiation: ascended and descended  NT  1 steps with Mod Ind.    ROM BLE:  WFL     Strength BLE:  4/5  4+/5   Balance Sitting EOB:  Sup  Dynamic Standing: Mod A x 2  Sitting EOB:  Ind  Dynamic Standing: Mod Ind. Pt is A & O x 3  Sensation:  Pt denies numbness and tingling to extremities  Edema:  none    Patient education  Pt educated on spinal precautions. Patient response to education:   Pt verbalized understanding Pt demonstrated skill Pt requires further education in this area   yes yes yes     Comments:  Patient was seen this date for PT evaluation.  Results of the functional assessment are noted above. Upon entering the room patient was found in supine position in bed. Completed education in spinal precautions with good follow through. Completed bed mobility and transfer however required increased assistance due to pain level. Increased cues required for sequencing and hand placements. Following assessment patient up in bedside chair for breakfast. Call light placed within reach and all lines were intact. This patient can benefit from the continuation of skilled PT to maximize functional level and return to PLOF. Pts/ family goals   1. Return to home. Patient and or family understand(s) diagnosis, prognosis, and plan of care. yes    PLAN  PT care will be provided in accordance with the objectives noted above. Whenever appropriate, clear delegation orders will be provided for nursing staff. Exercises and functional mobility practice will be used as well as appropriate assistive devices or modalities to obtain goals. Patient and family education will also be administered as needed. Frequency of treatments: daily x 7-10 days.     Time in  0805  Time out  [address] [facility], [address] [facility]

## 2018-07-04 PROCEDURE — 2580000003 HC RX 258: Performed by: NEUROLOGICAL SURGERY

## 2018-07-04 PROCEDURE — 96366 THER/PROPH/DIAG IV INF ADDON: CPT

## 2018-07-04 PROCEDURE — 97530 THERAPEUTIC ACTIVITIES: CPT

## 2018-07-04 PROCEDURE — 96375 TX/PRO/DX INJ NEW DRUG ADDON: CPT

## 2018-07-04 PROCEDURE — 6370000000 HC RX 637 (ALT 250 FOR IP): Performed by: NEUROLOGICAL SURGERY

## 2018-07-04 PROCEDURE — G0378 HOSPITAL OBSERVATION PER HR: HCPCS

## 2018-07-04 PROCEDURE — 6360000002 HC RX W HCPCS: Performed by: NEUROLOGICAL SURGERY

## 2018-07-04 PROCEDURE — 96376 TX/PRO/DX INJ SAME DRUG ADON: CPT

## 2018-07-04 RX ADMIN — Medication 10 ML: at 06:16

## 2018-07-04 RX ADMIN — HYDROCODONE BITARTRATE AND ACETAMINOPHEN 1 TABLET: 5; 325 TABLET ORAL at 11:37

## 2018-07-04 RX ADMIN — CYCLOBENZAPRINE 10 MG: 10 TABLET, FILM COATED ORAL at 20:59

## 2018-07-04 RX ADMIN — VANCOMYCIN HYDROCHLORIDE 2000 MG: 10 INJECTION, POWDER, LYOPHILIZED, FOR SOLUTION INTRAVENOUS at 14:26

## 2018-07-04 RX ADMIN — MORPHINE SULFATE 2 MG: 2 INJECTION, SOLUTION INTRAMUSCULAR; INTRAVENOUS at 09:09

## 2018-07-04 RX ADMIN — HYDROCODONE BITARTRATE AND ACETAMINOPHEN 2 TABLET: 5; 325 TABLET ORAL at 06:16

## 2018-07-04 RX ADMIN — HYDROCODONE BITARTRATE AND ACETAMINOPHEN 2 TABLET: 5; 325 TABLET ORAL at 00:04

## 2018-07-04 RX ADMIN — METOCLOPRAMIDE 10 MG: 5 INJECTION, SOLUTION INTRAMUSCULAR; INTRAVENOUS at 06:16

## 2018-07-04 RX ADMIN — Medication 10 ML: at 22:35

## 2018-07-04 RX ADMIN — Medication 10 ML: at 17:53

## 2018-07-04 RX ADMIN — Medication 10 ML: at 11:36

## 2018-07-04 RX ADMIN — Medication 10 ML: at 03:44

## 2018-07-04 RX ADMIN — METOCLOPRAMIDE 10 MG: 5 INJECTION, SOLUTION INTRAMUSCULAR; INTRAVENOUS at 22:35

## 2018-07-04 RX ADMIN — HYDROCODONE BITARTRATE AND ACETAMINOPHEN 1 TABLET: 5; 325 TABLET ORAL at 17:05

## 2018-07-04 RX ADMIN — Medication 10 ML: at 09:10

## 2018-07-04 RX ADMIN — OLODATEROL RESPIMAT INHALATION SPRAY 2 PUFF: 2.5 SPRAY, METERED RESPIRATORY (INHALATION) at 09:09

## 2018-07-04 RX ADMIN — MORPHINE SULFATE 4 MG: 4 INJECTION INTRAVENOUS at 01:26

## 2018-07-04 RX ADMIN — CYCLOBENZAPRINE 10 MG: 10 TABLET, FILM COATED ORAL at 12:31

## 2018-07-04 RX ADMIN — VANCOMYCIN HYDROCHLORIDE 2000 MG: 10 INJECTION, POWDER, LYOPHILIZED, FOR SOLUTION INTRAVENOUS at 01:26

## 2018-07-04 RX ADMIN — MORPHINE SULFATE 2 MG: 2 INJECTION, SOLUTION INTRAMUSCULAR; INTRAVENOUS at 13:56

## 2018-07-04 RX ADMIN — Medication 10 ML: at 20:52

## 2018-07-04 RX ADMIN — TIOTROPIUM BROMIDE 18 MCG: 18 CAPSULE ORAL; RESPIRATORY (INHALATION) at 09:09

## 2018-07-04 RX ADMIN — LACTULOSE 20 G: 20 SOLUTION ORAL at 14:53

## 2018-07-04 RX ADMIN — CYCLOBENZAPRINE 10 MG: 10 TABLET, FILM COATED ORAL at 03:44

## 2018-07-04 RX ADMIN — METOCLOPRAMIDE 10 MG: 5 INJECTION, SOLUTION INTRAMUSCULAR; INTRAVENOUS at 17:52

## 2018-07-04 RX ADMIN — HYDROCODONE BITARTRATE AND ACETAMINOPHEN 2 TABLET: 5; 325 TABLET ORAL at 22:35

## 2018-07-04 RX ADMIN — MORPHINE SULFATE 2 MG: 2 INJECTION, SOLUTION INTRAMUSCULAR; INTRAVENOUS at 03:44

## 2018-07-04 RX ADMIN — LACTULOSE 20 G: 20 SOLUTION ORAL at 09:09

## 2018-07-04 RX ADMIN — Medication 10 ML: at 01:26

## 2018-07-04 RX ADMIN — MULTIPLE VITAMINS W/ MINERALS TAB 1 TABLET: TAB at 09:09

## 2018-07-04 RX ADMIN — LACTULOSE 20 G: 20 SOLUTION ORAL at 20:59

## 2018-07-04 RX ADMIN — METOCLOPRAMIDE 10 MG: 5 INJECTION, SOLUTION INTRAMUSCULAR; INTRAVENOUS at 11:36

## 2018-07-04 ASSESSMENT — PAIN SCALES - GENERAL
PAINLEVEL_OUTOF10: 7
PAINLEVEL_OUTOF10: 2
PAINLEVEL_OUTOF10: 7
PAINLEVEL_OUTOF10: 6
PAINLEVEL_OUTOF10: 3
PAINLEVEL_OUTOF10: 5
PAINLEVEL_OUTOF10: 2
PAINLEVEL_OUTOF10: 6
PAINLEVEL_OUTOF10: 3
PAINLEVEL_OUTOF10: 2
PAINLEVEL_OUTOF10: 7
PAINLEVEL_OUTOF10: 9
PAINLEVEL_OUTOF10: 5
PAINLEVEL_OUTOF10: 7

## 2018-07-04 ASSESSMENT — PAIN DESCRIPTION - FREQUENCY
FREQUENCY: CONTINUOUS

## 2018-07-04 ASSESSMENT — PAIN DESCRIPTION - DESCRIPTORS
DESCRIPTORS: ACHING;DISCOMFORT;SORE
DESCRIPTORS: ACHING;DISCOMFORT;SORE
DESCRIPTORS: ACHING;CONSTANT;DISCOMFORT;SORE
DESCRIPTORS: ACHING;SHARP;CONSTANT
DESCRIPTORS: SHARP;JABBING;DISCOMFORT
DESCRIPTORS: ACHING;DISCOMFORT;SORE;CONSTANT
DESCRIPTORS: ACHING;CRUSHING;CONSTANT
DESCRIPTORS: SHARP;SHOOTING;STABBING

## 2018-07-04 ASSESSMENT — PAIN DESCRIPTION - ORIENTATION
ORIENTATION: LOWER
ORIENTATION: LOWER;MID
ORIENTATION: LOWER

## 2018-07-04 ASSESSMENT — PAIN DESCRIPTION - LOCATION
LOCATION: BACK

## 2018-07-04 ASSESSMENT — PAIN DESCRIPTION - PAIN TYPE
TYPE: SURGICAL PAIN

## 2018-07-04 ASSESSMENT — PAIN DESCRIPTION - ONSET
ONSET: ON-GOING
ONSET: ON-GOING
ONSET: AWAKENED FROM SLEEP

## 2018-07-04 NOTE — PLAN OF CARE
Problem: Mobility - Impaired:  Goal: Mobility will improve to maximum level  Mobility will improve to maximum level   Outcome: Not Met This Shift      Problem: Pain:  Goal: Control of acute pain  Control of acute pain   Outcome: Met This Shift      Problem: Falls - Risk of:  Goal: Will remain free from falls  Will remain free from falls   Outcome: Met This Shift      Problem: Skin Integrity:  Goal: Will show no infection signs and symptoms  Will show no infection signs and symptoms   Outcome: Met This Shift

## 2018-07-04 NOTE — ANESTHESIA POSTPROCEDURE EVALUATION
Department of Anesthesiology  Postprocedure Note    Patient: Paola Saul  MRN: [id number]  YOB: 1955  Date of evaluation: 7/3/2018  Time:  11:12 PM     Procedure Summary     Date:  07/02/18 Room / Location:  [facility] OR 06 / [facility]    Anesthesia Start:  1205 Anesthesia Stop:  3258    Procedure:  L3- S1 LUMBAR LAMINECTOMY (N/A ) Diagnosis:  (LUMBAR STENOSIS)    Surgeon:  Anu Mead MD Responsible Provider:  Ozzie Magaña MD    Anesthesia Type:  general ASA Status:  3          Anesthesia Type: general    Veena Phase I: Veena Score: 9    Veena Phase II:      Last vitals: Reviewed and per EMR flowsheets.        Anesthesia Post Evaluation    Patient location during evaluation: PACU  Patient participation: complete - patient participated  Level of consciousness: awake  Airway patency: patent  Nausea & Vomiting: no vomiting and no nausea  Complications: no  Cardiovascular status: hemodynamically stable  Respiratory status: acceptable  Hydration status: stable

## 2018-07-04 NOTE — PROGRESS NOTES
Patients dressing reassessed per wife's request and there was a large amount of drainage toward the bottom of the dressing, but it was no longer drainage at the time it was assessed. When first assessed at the beginning of the shift there was moderate drainage Dressing reinforced with ABDs at this time. Will continue to monitor.

## 2018-07-05 PROCEDURE — 97530 THERAPEUTIC ACTIVITIES: CPT

## 2018-07-05 PROCEDURE — 6370000000 HC RX 637 (ALT 250 FOR IP): Performed by: NEUROLOGICAL SURGERY

## 2018-07-05 PROCEDURE — 97110 THERAPEUTIC EXERCISES: CPT

## 2018-07-05 PROCEDURE — G0378 HOSPITAL OBSERVATION PER HR: HCPCS

## 2018-07-05 PROCEDURE — 96376 TX/PRO/DX INJ SAME DRUG ADON: CPT

## 2018-07-05 PROCEDURE — 2580000003 HC RX 258: Performed by: NEUROLOGICAL SURGERY

## 2018-07-05 PROCEDURE — 97535 SELF CARE MNGMENT TRAINING: CPT

## 2018-07-05 PROCEDURE — 6370000000 HC RX 637 (ALT 250 FOR IP): Performed by: FAMILY MEDICINE

## 2018-07-05 PROCEDURE — 6360000002 HC RX W HCPCS: Performed by: NEUROLOGICAL SURGERY

## 2018-07-05 RX ORDER — CYCLOBENZAPRINE HCL 10 MG
10 TABLET ORAL 3 TIMES DAILY PRN
Qty: 90 TABLET | Refills: 0 | Status: SHIPPED | OUTPATIENT
Start: 2018-07-05 | End: 2018-07-15

## 2018-07-05 RX ORDER — HYDROCODONE BITARTRATE AND ACETAMINOPHEN 5; 325 MG/1; MG/1
2 TABLET ORAL EVERY 4 HOURS PRN
Qty: 120 TABLET | Refills: 0 | Status: SHIPPED | OUTPATIENT
Start: 2018-07-05 | End: 2019-01-14

## 2018-07-05 RX ORDER — SODIUM PHOSPHATE, DIBASIC AND SODIUM PHOSPHATE, MONOBASIC 7; 19 G/133ML; G/133ML
1 ENEMA RECTAL
Status: ACTIVE | OUTPATIENT
Start: 2018-07-05 | End: 2018-07-05

## 2018-07-05 RX ORDER — AMLODIPINE BESYLATE 5 MG/1
5 TABLET ORAL DAILY
Status: DISCONTINUED | OUTPATIENT
Start: 2018-07-05 | End: 2018-07-06 | Stop reason: HOSPADM

## 2018-07-05 RX ORDER — AMLODIPINE BESYLATE 5 MG/1
5 TABLET ORAL DAILY
Status: DISCONTINUED | OUTPATIENT
Start: 2018-07-05 | End: 2018-07-05

## 2018-07-05 RX ORDER — HYDROCODONE BITARTRATE AND ACETAMINOPHEN 5; 325 MG/1; MG/1
2 TABLET ORAL EVERY 4 HOURS PRN
Qty: 120 TABLET | Refills: 0 | Status: SHIPPED | OUTPATIENT
Start: 2018-07-05 | End: 2018-07-05

## 2018-07-05 RX ADMIN — LACTULOSE 20 G: 20 SOLUTION ORAL at 14:16

## 2018-07-05 RX ADMIN — MAGESIUM CITRATE 296 ML: 1.75 LIQUID ORAL at 16:30

## 2018-07-05 RX ADMIN — STANDARDIZED SENNA CONCENTRATE AND DOCUSATE SODIUM 2 TABLET: 8.6; 5 TABLET, FILM COATED ORAL at 08:51

## 2018-07-05 RX ADMIN — MULTIPLE VITAMINS W/ MINERALS TAB 1 TABLET: TAB at 08:17

## 2018-07-05 RX ADMIN — AMLODIPINE BESYLATE 5 MG: 5 TABLET ORAL at 20:48

## 2018-07-05 RX ADMIN — HYDROCODONE BITARTRATE AND ACETAMINOPHEN 2 TABLET: 5; 325 TABLET ORAL at 08:51

## 2018-07-05 RX ADMIN — CYCLOBENZAPRINE 10 MG: 10 TABLET, FILM COATED ORAL at 08:51

## 2018-07-05 RX ADMIN — LACTULOSE 20 G: 20 SOLUTION ORAL at 08:17

## 2018-07-05 RX ADMIN — CYCLOBENZAPRINE 10 MG: 10 TABLET, FILM COATED ORAL at 18:28

## 2018-07-05 RX ADMIN — ACETAMINOPHEN 650 MG: 325 TABLET, FILM COATED ORAL at 14:02

## 2018-07-05 RX ADMIN — Medication 10 ML: at 20:50

## 2018-07-05 RX ADMIN — METOCLOPRAMIDE 10 MG: 5 INJECTION, SOLUTION INTRAMUSCULAR; INTRAVENOUS at 23:25

## 2018-07-05 RX ADMIN — ACETAMINOPHEN 650 MG: 325 TABLET, FILM COATED ORAL at 01:00

## 2018-07-05 RX ADMIN — METOCLOPRAMIDE 10 MG: 5 INJECTION, SOLUTION INTRAMUSCULAR; INTRAVENOUS at 17:23

## 2018-07-05 ASSESSMENT — PAIN SCALES - GENERAL
PAINLEVEL_OUTOF10: 3
PAINLEVEL_OUTOF10: 0
PAINLEVEL_OUTOF10: 2
PAINLEVEL_OUTOF10: 8
PAINLEVEL_OUTOF10: 4
PAINLEVEL_OUTOF10: 3
PAINLEVEL_OUTOF10: 2

## 2018-07-05 ASSESSMENT — PAIN DESCRIPTION - FREQUENCY
FREQUENCY: CONTINUOUS
FREQUENCY: CONTINUOUS

## 2018-07-05 ASSESSMENT — PAIN DESCRIPTION - DESCRIPTORS
DESCRIPTORS: ACHING;DISCOMFORT;HEADACHE
DESCRIPTORS: ACHING;DULL;DISCOMFORT
DESCRIPTORS: ACHING;CONSTANT;HEADACHE
DESCRIPTORS: ACHING;SHARP;DISCOMFORT

## 2018-07-05 ASSESSMENT — PAIN DESCRIPTION - PAIN TYPE
TYPE: ACUTE PAIN
TYPE: ACUTE PAIN
TYPE: SURGICAL PAIN
TYPE: ACUTE PAIN

## 2018-07-05 ASSESSMENT — PAIN DESCRIPTION - ORIENTATION: ORIENTATION: LOWER

## 2018-07-05 ASSESSMENT — PAIN DESCRIPTION - ONSET
ONSET: ON-GOING
ONSET: ON-GOING

## 2018-07-05 ASSESSMENT — PAIN DESCRIPTION - LOCATION
LOCATION: BACK
LOCATION: HEAD

## 2018-07-05 NOTE — CARE COORDINATION
Met with pt and wife in room. He is sitting up in chair after having therapy session. Discussed therapy evals. He states he has been walking to  and to doorway with help. Both he and his wife prefer to go home with [facility]. They are requesting another therapy treatment this afternoon in hopes of discharging later. Left message with therapy dept.

## 2018-07-05 NOTE — PROGRESS NOTES
Occupational Therapy  OT BEDSIDE TREATMENT NOTE      Date:2018  Patient Name: Gela Fiore  MRN: [id number]  : 1955  Room: [address] [place], [place] [address]     Evaluating OT: Vee Chau, OTR/L   License #  [id number]     Recommended Adaptive Equipment: bariatric w.walkerLEXUS for LB ADLs (issued )     AM PAC ADL RAW SCORE -  17/24  G code:     Diagnosis:  LUMBAR STENOSIS   Surgery: L3- S1 LUMBAR LAMINECTOMY 18  Past Medical History:   Past Medical History             Diagnosis Date    COPD (chronic obstructive pulmonary disease) (HCC)      Osteoarthritis      Sleep apnea       REFUSES CPAP    Spinal stenosis           Precautions: falls, neutral spine (no BLT), WBAT L LE in CAM boot (L ankle ORIF with Dr. Shila Gusman 18), B hearing aides     Pt was agreeable to eval/treatment this day: yes     Home Living: Pt lives wife (states she is caregiver for mother, not home ) in a 1st floor apt. with 1 4\" stair to enter and no rails; bed/bath on main  Bathroom setup: tub/shower  Equipment owned: rollator, w/c, CAM boot     Prior Level of Function: Ind. with ADLs & wife assist with IADLs; using rollator for ambulation. Driving: active, but wife drives past few months  Occupation:       Pain: Pt reports Min back pain. Cognition: oriented x 4; follows multi- step directions    Functional Assessment:  Short term goals  Time Frame: 4 days  Goal (1)  Pt. will increase grooming to Mod I  Goal (2)  Pt. will increase UB bathing & dressing to s/u/ Mod I  Goal (3)  Pt. will increase LB bathing & dressing to SBA with A.E.  Goal (4)  Pt. will increase bed mobility to SBA  Goal (5)  Pt. will increase functional transfers & mobility to SBA  Goal (6) Pt. will demonstrate (wfl) safety and understanding of precautions during self-care routine/ functional transfers. Functional Assessment:    Initial Eval Status 7/3 Current status    Feeding  Ind.  Independent   Grooming  s/u Set up- pt demonstrates AROM to comb

## 2018-07-05 NOTE — PROGRESS NOTES
Department of Neurosurgery  Physician Assistant Progress Note    CHIEF COMPLAINT: Patient POD 3 s/p L3-S1 laminectomy. SUBJECTIVE:   Tolerating opsite pain better.   Not ambulating well, c/o leg weakness    ROS:  Denies chest pain, palpitations, or SOB  Denies fever, chills, or night sweats    OBJECTIVE  Physical  VITALS:  BP (!) 169/72   Pulse 104   Temp 97.7 °F (36.5 °C) (Temporal)   Resp 18   Ht 6' 2\" (1.88 m)   Wt (!) 322 lb (146.1 kg)   SpO2 94%   BMI 41.34 kg/m²    Awake and alert   No apparent distress   Non-labored breathing   FC x 4 ext   Sensory grossly intact   Incision covered    Data  CBC with Differential:    Lab Results   Component Value Date    WBC 7.1 06/25/2018    RBC 4.63 06/25/2018    HGB 14.6 06/25/2018    HCT 43.9 06/25/2018     06/25/2018    MCV 94.8 06/25/2018    MCH 31.5 06/25/2018    MCHC 33.3 06/25/2018    RDW 14.6 06/25/2018    LYMPHOPCT 30.9 06/25/2018    MONOPCT 10.8 06/25/2018    BASOPCT 1.0 06/25/2018    MONOSABS 0.76 06/25/2018    LYMPHSABS 2.18 06/25/2018    EOSABS 0.32 06/25/2018    BASOSABS 0.07 06/25/2018     Current Inpatient Medications  Current Facility-Administered Medications: umeclidinium-vilanterol (ANORO ELLIPTA) 62.5-25 MCG/INH inhaler 1 puff, 1 puff, Inhalation, Daily  therapeutic multivitamin-minerals 1 tablet, 1 tablet, Oral, Daily  albuterol sulfate  (90 Base) MCG/ACT inhaler 2 puff, 2 puff, Inhalation, Q4H PRN  sodium chloride flush 0.9 % injection 10 mL, 10 mL, Intravenous, 2 times per day  sodium chloride flush 0.9 % injection 10 mL, 10 mL, Intravenous, PRN  acetaminophen (TYLENOL) tablet 650 mg, 650 mg, Oral, Q4H PRN  ondansetron (ZOFRAN) injection 4 mg, 4 mg, Intravenous, Q6H PRN  HYDROcodone-acetaminophen (NORCO) 5-325 MG per tablet 1 tablet, 1 tablet, Oral, Q4H PRN **OR** HYDROcodone-acetaminophen (NORCO) 5-325 MG per tablet 2 tablet, 2 tablet, Oral, Q4H PRN  morphine (PF) injection 2 mg, 2 mg, Intravenous, Q2H PRN **OR** morphine (PF) injection 4 mg, 4 mg, Intravenous, Q2H PRN  cyclobenzaprine (FLEXERIL) tablet 10 mg, 10 mg, Oral, TID PRN  metoclopramide (REGLAN) injection 10 mg, 10 mg, Intravenous, Q6H  sennosides-docusate sodium (SENOKOT-S) 8.6-50 MG tablet 2 tablet, 2 tablet, Oral, Daily PRN  lactulose (CHRONULAC) 10 GM/15ML solution 20 g, 20 g, Oral, TID    ASSESSMENT AND PLAN:  Patient POD 3 s/p L3-S1 laminectomy.  Stable.   -Pain control  -Bowel regimen  -PT/OT  -No brace needed

## 2018-07-06 VITALS
BODY MASS INDEX: 40.43 KG/M2 | HEIGHT: 74 IN | HEART RATE: 88 BPM | RESPIRATION RATE: 18 BRPM | DIASTOLIC BLOOD PRESSURE: 76 MMHG | OXYGEN SATURATION: 97 % | TEMPERATURE: 97.5 F | SYSTOLIC BLOOD PRESSURE: 140 MMHG | WEIGHT: 315 LBS

## 2018-07-06 PROCEDURE — 97112 NEUROMUSCULAR REEDUCATION: CPT

## 2018-07-06 PROCEDURE — 96376 TX/PRO/DX INJ SAME DRUG ADON: CPT

## 2018-07-06 PROCEDURE — 2580000003 HC RX 258: Performed by: NEUROLOGICAL SURGERY

## 2018-07-06 PROCEDURE — 97530 THERAPEUTIC ACTIVITIES: CPT

## 2018-07-06 PROCEDURE — 6370000000 HC RX 637 (ALT 250 FOR IP): Performed by: FAMILY MEDICINE

## 2018-07-06 PROCEDURE — 6370000000 HC RX 637 (ALT 250 FOR IP): Performed by: NEUROLOGICAL SURGERY

## 2018-07-06 PROCEDURE — 6360000002 HC RX W HCPCS: Performed by: NEUROLOGICAL SURGERY

## 2018-07-06 PROCEDURE — G0378 HOSPITAL OBSERVATION PER HR: HCPCS

## 2018-07-06 PROCEDURE — 97535 SELF CARE MNGMENT TRAINING: CPT

## 2018-07-06 RX ADMIN — MULTIPLE VITAMINS W/ MINERALS TAB 1 TABLET: TAB at 10:24

## 2018-07-06 RX ADMIN — Medication 10 ML: at 06:39

## 2018-07-06 RX ADMIN — METOCLOPRAMIDE 10 MG: 5 INJECTION, SOLUTION INTRAMUSCULAR; INTRAVENOUS at 06:39

## 2018-07-06 RX ADMIN — ACETAMINOPHEN 650 MG: 325 TABLET, FILM COATED ORAL at 06:39

## 2018-07-06 RX ADMIN — Medication 10 ML: at 10:24

## 2018-07-06 RX ADMIN — CYCLOBENZAPRINE 10 MG: 10 TABLET, FILM COATED ORAL at 17:08

## 2018-07-06 RX ADMIN — LACTULOSE 20 G: 20 SOLUTION ORAL at 10:23

## 2018-07-06 RX ADMIN — METOCLOPRAMIDE 10 MG: 5 INJECTION, SOLUTION INTRAMUSCULAR; INTRAVENOUS at 10:24

## 2018-07-06 RX ADMIN — AMLODIPINE BESYLATE 5 MG: 5 TABLET ORAL at 10:24

## 2018-07-06 RX ADMIN — HYDROCODONE BITARTRATE AND ACETAMINOPHEN 2 TABLET: 5; 325 TABLET ORAL at 17:08

## 2018-07-06 ASSESSMENT — PAIN SCALES - GENERAL
PAINLEVEL_OUTOF10: 3
PAINLEVEL_OUTOF10: 5
PAINLEVEL_OUTOF10: 7

## 2018-07-06 ASSESSMENT — PAIN DESCRIPTION - ONSET: ONSET: ON-GOING

## 2018-07-06 ASSESSMENT — PAIN DESCRIPTION - FREQUENCY: FREQUENCY: CONTINUOUS

## 2018-07-06 ASSESSMENT — PAIN DESCRIPTION - PAIN TYPE: TYPE: ACUTE PAIN

## 2018-07-06 ASSESSMENT — PAIN DESCRIPTION - LOCATION: LOCATION: HEAD

## 2018-07-06 ASSESSMENT — PAIN DESCRIPTION - DESCRIPTORS: DESCRIPTORS: ACHING;DISCOMFORT;DULL

## 2018-07-06 NOTE — CARE COORDINATION
Social Work/Discharge Planning:      SW met with pt, pt stated that plan at discharge is to return home with [address] Pt stated that his wife can assist if needed and can provide transportation home. CAMELIA discussed DME with pt in regards to bariatric bedside commode and walker. Pt declined bariatric bedside commode at this time, pt stated that he has a raised toilet seat at home to use if needed. SW discussed OT recommendation of bariatric walker with pt, pt declined bariatric walker at this time. Pt stated that he is currently renting a wheelchair through his insurance and he has purchased a bariatric rollator at home to use if needed. Pt stated that he will follow up with Lexa Fernandez or PCP for Bariatric Walker if needed. SW faxed Lexa 78 orders to [address] at [phone]. Will need to notify [address] of pt discharge at [phone]. CAMELIA will follow.     Electronically signed by RAJESH Chavez on 7/6/2018 at 2:01 PM

## 2018-07-06 NOTE — PROGRESS NOTES
Physical Therapy  Facility/Department: [address] NEURO SPINE  Daily Treatment Note  NAME: Barbie Lorenzana  : 1955  MRN: [id number]    Date of Service: 2018  Evaluating PT: Ann Chaney, PT [id number]     Room #:  8319/7189-V  Diagnosis:  DJD lumbar spine  Surgery: 18 s/p L3-S1 laminectomy  PMHx:    Past Medical History                Diagnosis Date    COPD (chronic obstructive pulmonary disease) ([facility] [address]      Osteoarthritis      Sleep apnea       REFUSES CPAP    Spinal stenosis           Precautions:  Spinal precautions. CAM boot LLE WBAT per wife and patient report - they state he can start walking without the boot as tolerated   Equipment Needs:  none     Pt lives with wife in a 1st floor apartment with 1 stairs to enter and no rail.  Bed is on 1 floor and bath is on 1 floor.  Pt ambulated with Ind with use of rollator PTA. Equipment owned: rollator, manual w/c which will be returned shortly.        Initial Evaluation  Date: 7/3/18 Treatment  18 Short Term/ Long Term   Goals   AM-PAC 6 Clicks 92/88  81/ 24      Was pt agreeable to Eval/treatment? yes  yes     Does pt have pain? 8/10  minimal back pain      Bed Mobility  Rolling: Min A  Supine to sit: Mod A  Sit to supine: NT  Scooting: Mod A  NT, pt seated in bedside chair on arrival  Rolling: ind  Supine to sit: Ind  Sit to supine: Ind  Scooting: Ind   Transfers Sit to stand: Mod A x 2  Stand to sit: Mod A x 2  Stand pivot: Mod A x2  Sit<> stand : supervision  Stand pivot : w/ww supervision  Cam boot donned Sit to stand: Mod Ind  Stand to sit: Mod Ind  Stand pivot: Mod Ind   Ambulation    NT only SPT transfer this session. ww 100 feet with Supervisoin >150 with fww with Mod Ind. Stair negotiation: ascended and descended  NT NA, pt reporting small threshold into sliding glass door, no concerns with ww 1 steps with Mod Ind. Additional Comments:   Wife present throughout treatment. Pt seated in bedside chair on arrival with L cam boot donned. Pt ambulated with no LOB or unsteadiness. Cues given to improve downward gaze, however pt reporting he has always ambulated while \"watching my feet\" . Educated pt and wife on therex to perform once returned home, safety and energy conservation. Pt was left B/S chair with call light left by patient. Chair/bed alarm: NA     Time in: 0945  Time out: 1008      Continue with physical therapy current plan of care.     [facility] PTA [id number]

## 2018-07-06 NOTE — PROGRESS NOTES
Oral, Q4H PRN  morphine (PF) injection 2 mg, 2 mg, Intravenous, Q2H PRN **OR** morphine (PF) injection 4 mg, 4 mg, Intravenous, Q2H PRN  cyclobenzaprine (FLEXERIL) tablet 10 mg, 10 mg, Oral, TID PRN  metoclopramide (REGLAN) injection 10 mg, 10 mg, Intravenous, Q6H  sennosides-docusate sodium (SENOKOT-S) 8.6-50 MG tablet 2 tablet, 2 tablet, Oral, Daily PRN  lactulose (CHRONULAC) 10 GM/15ML solution 20 g, 20 g, Oral, TID    ASSESSMENT AND PLAN:  Patient POD 4 s/p L3-S1 laminectomy.  Stable.   -Pain control  -Bowel regimen  -PT/OT re-eval--attempting to get patient home today with HHPT  -No brace needed  -Ambulation encouraged  -Leave incision open to air  -Will follow

## 2018-07-06 NOTE — PROGRESS NOTES
Admit Date: 7/2/2018      Subjective:     Patient: no acute issues reported overnight; asked to see this patient due to elevated BP  Medication side effects: none    Scheduled Meds:   amLODIPine  5 mg Oral Daily    umeclidinium-vilanterol  1 puff Inhalation Daily    therapeutic multivitamin-minerals  1 tablet Oral Daily    sodium chloride flush  10 mL Intravenous 2 times per day    metoclopramide  10 mg Intravenous Q6H    lactulose  20 g Oral TID     Continuous Infusions:  PRN Meds:albuterol sulfate HFA, sodium chloride flush, acetaminophen, ondansetron, HYDROcodone 5 mg - acetaminophen **OR** HYDROcodone 5 mg - acetaminophen, morphine **OR** morphine, cyclobenzaprine, sennosides-docusate sodium    Objective:   I/O last 3 completed shifts: In: 960 [P.O.:960]  Out: -   No intake/output data recorded.     BP (!) 132/58   Pulse 77   Temp 98.6 °F (37 °C) (Temporal)   Resp 16   Ht 6' 2\" (1.88 m)   Wt (!) 322 lb (146.1 kg)   SpO2 94%   BMI 41.34 kg/m²   General appearance: alert, appears stated age and cooperative  Skin:negative  Heent:negative  Lungs: clear to auscultation bilaterally  Heart: regular rate and rhythm, S1, S2 normal, no murmur, click, rub or gallop  Abdomen: soft, non-tender; bowel sounds normal; no masses,  no organomegaly  Extremities:NO EDEMA  Neurologic: Grossly normal    Labs:  CBC with Differential:    Lab Results   Component Value Date    WBC 7.1 06/25/2018    RBC 4.63 06/25/2018    HGB 14.6 06/25/2018    HCT 43.9 06/25/2018     06/25/2018    MCV 94.8 06/25/2018    MCH 31.5 06/25/2018    MCHC 33.3 06/25/2018    RDW 14.6 06/25/2018    LYMPHOPCT 30.9 06/25/2018    MONOPCT 10.8 06/25/2018    BASOPCT 1.0 06/25/2018    MONOSABS 0.76 06/25/2018    LYMPHSABS 2.18 06/25/2018    EOSABS 0.32 06/25/2018    BASOSABS 0.07 06/25/2018     BMP:    Lab Results   Component Value Date     07/02/2018    K 4.9 07/02/2018    K 4.7 06/25/2018     07/02/2018    CO2 24 07/02/2018    BUN 13 07/02/2018    LABALBU 4.1 07/02/2018    LABALBU 4.1 04/18/2012    CREATININE 0.8 07/02/2018    CALCIUM 9.4 07/02/2018    GFRAA >60 07/02/2018    LABGLOM >60 07/02/2018     PT/INR:    Lab Results   Component Value Date    PROTIME 10.3 06/25/2018    INR 0.9 06/25/2018     Last 3 Troponin:  No results found for: TROPONINI  TSH:  No results found for: TSH   Assessment:     1.  S/P L3-S1 laminectomy  2, HTN    Plan:       Continue same plan and orders    Dc planning

## 2018-07-06 NOTE — PROGRESS NOTES
Occupational Therapy  OT BEDSIDE TREATMENT NOTE      Date:2018  Patient Name: Dominic Amador  MRN: [id number]  : 1955  Room: [address] [place], [place] [address]     Evaluating OT: Yamil Chau OTR/L   License #  [id number]     Recommended Adaptive Equipment: bariatric w.LEXUS vu for LB ADLs (issued )     AM PAC ADL RAW SCORE -  18/24  G code: CK     Diagnosis:  LUMBAR STENOSIS   Surgery: L3- S1 LUMBAR LAMINECTOMY 18    Past Medical History:   Past Medical History             Diagnosis Date    COPD (chronic obstructive pulmonary disease) (HCC)      Osteoarthritis      Sleep apnea       REFUSES CPAP    Spinal stenosis           Precautions: falls, neutral spine (no BLT), WBAT L LE in CAM boot (L ankle ORIF with Dr. Geronimo Generous 18), B hearing aides     Pt was agreeable to eval/treatment this day: yes     Home Living: Pt lives wife (states she is caregiver for mother, not home ) in a 1st floor apt. with 1 4\" stair to enter and no rails; bed/bath on main  Bathroom setup: tub/shower  Equipment owned: rollator, w/c, CAM boot     Prior Level of Function: Ind. with ADLs & wife assist with IADLs; using rollator for ambulation. Driving: active, but wife drives past few months  Occupation:     Pain: No c/o pain at this time. Cognition: A&O x3 with pt able to follow commands and respond appropriately. Functional Assessment:  Goal (1)  Pt. will increase grooming to Mod I  Goal (2)  Pt. will increase UB bathing & dressing to s/u/ Mod I  Goal (3)  Pt. will increase LB bathing & dressing to SBA with A.E.  Goal (4)  Pt. will increase bed mobility to SBA  Goal (5)  Pt. will increase functional transfers & mobility to SBA  Goal (6) Pt. will demonstrate (wfl) safety and understanding of precautions during self-care routine/ functional transfers. Functional Assessment:    Initial Eval Status 7/3 Current status    Feeding  Ind.  Independent     Grooming  s/u Setup     Upper Body Dressing SBA  Setup    - to

## 2018-07-10 ENCOUNTER — TELEPHONE (OUTPATIENT)
Dept: NEUROSURGERY | Age: 63
End: 2018-07-10

## 2018-07-10 NOTE — TELEPHONE ENCOUNTER
Patients wife called stated Liliya Olea had a Laminectomy on 7-2-18. Has been sleeping on the couch with a pillow behind him. She said there is drainage on the pillow and the incision is red but is not open anywhere. Wants to know if this is anything to be concerned about.     Phone:  [phone]

## 2018-07-11 ENCOUNTER — OFFICE VISIT (OUTPATIENT)
Dept: NEUROSURGERY | Age: 63
End: 2018-07-11

## 2018-07-11 VITALS
HEIGHT: 74 IN | HEART RATE: 70 BPM | DIASTOLIC BLOOD PRESSURE: 72 MMHG | WEIGHT: 315 LBS | BODY MASS INDEX: 40.43 KG/M2 | SYSTOLIC BLOOD PRESSURE: 137 MMHG

## 2018-07-11 DIAGNOSIS — M51.26 LUMBAR DISC HERNIATION: Primary | ICD-10-CM

## 2018-07-11 PROCEDURE — 99024 POSTOP FOLLOW-UP VISIT: CPT | Performed by: PHYSICIAN ASSISTANT

## 2018-07-11 RX ORDER — SULFAMETHOXAZOLE AND TRIMETHOPRIM 800; 160 MG/1; MG/1
1 TABLET ORAL 2 TIMES DAILY
Qty: 20 TABLET | Refills: 0 | Status: SHIPPED | OUTPATIENT
Start: 2018-07-11 | End: 2019-01-14

## 2018-07-19 NOTE — DISCHARGE SUMMARY
Discharge [address] [place] [address] Department of Neurosurgery    Patient: Yolanda Tanner  YOB: 1955  MRN: [id number]    Admission Date: 7/2/2018  Discharge Date: 7/6/2018    Primary Diagnosis: Lumbar canal stenosis from L3 to S1. Other Diagnoses:      Diagnosis Date    COPD (chronic obstructive pulmonary disease) ([facility] [address]     Osteoarthritis     Sleep apnea     REFUSES CPAP    Spinal stenosis        Principal Procedure:  Bilateral L3, L4, L5, and S1 laminectomy, bilateral L3-L4, L4-L5, and L5-S1 medial facetectomy and bilateral L3, L4, L5, and S1 foraminotomy. Other Procedure: None     History:The patient is a 76-year-old gentleman who presented to the office with back pain that radiates into his legs with bilateral lower extremity weakness. He had an MRI that showed that he had lumbar canal stenosis from L3 to S1 and after risks, benefits and all alternatives were discussed with the patient, it was determined that he would undergo the above-mentioned procedure. Physical Exam:  WDWN, resting comfortable, no apparent distress  Appears stated age  Vitals stable  Non-labored breathing   A&O x 3, normal affect  Head is normocephalic, atraumatic   No palpable lymphadenopathy   Abdomen soft, nontender  Pupils equal and reactive, no scleral icterus  EOMI bilaterally  Cranial nerves II-XII intact bilaterally  No drift  FC x 4 ext  Sensation to LT intact x 4 ext  Toes going down  Incision clean and dry  Skin warm and dry     Hospital Course: Patient was admitted and underwent the above procedure without complications. He was monitored in PACU and transferred to Neurospine where he received adequate pain control and PO intake. He was evaluated by PT/OT and was discharged home in good condition with HH. Discharge Instructions:  1. No lifting more than 10 pounds. 2. Refrain from bending, twisting, or turning at the waist.  3. No brace is needed to be worn. 4. Leave incision open to air.   5. All stitches are under the skin and will dissolve in time. 6. Patient may shower, do not soak or scrub at the incision site. 7. Refrain from driving and sexual activity for 1 month. 8. Follow-up in the office in 1 month, no films necessary.      Discharge Medication:  -Norco 5-325mg 2 tabs PO every 4 hours PRN  -Flexeril 10mg 1 tab PO TID

## 2018-07-25 ENCOUNTER — TELEPHONE (OUTPATIENT)
Dept: NEUROSURGERY | Age: 63
End: 2018-07-25

## 2018-07-30 ENCOUNTER — OFFICE VISIT (OUTPATIENT)
Dept: NEUROSURGERY | Age: 63
End: 2018-07-30

## 2018-07-30 VITALS
SYSTOLIC BLOOD PRESSURE: 161 MMHG | WEIGHT: 315 LBS | HEIGHT: 74 IN | DIASTOLIC BLOOD PRESSURE: 113 MMHG | HEART RATE: 73 BPM | BODY MASS INDEX: 40.43 KG/M2

## 2018-07-30 DIAGNOSIS — M54.16 LUMBAR RADICULOPATHY: Primary | ICD-10-CM

## 2018-07-30 PROCEDURE — 99024 POSTOP FOLLOW-UP VISIT: CPT | Performed by: PHYSICIAN ASSISTANT

## 2018-07-30 NOTE — PROGRESS NOTES
Post Operative Follow-up    Patient is status post:  Lumbar laminectomy. Pre op leg paon and numbness improving. wes op site pain    Physical Exam  Alert and Oriented X 3  PERRLA, EOMI  DALEY 5/5  Wound: C/D/I    A/P: patient is s/p lumbar laminectomy. Continue with all restrictions. F/u 2 months.

## 2018-09-27 ENCOUNTER — OFFICE VISIT (OUTPATIENT)
Dept: NEUROSURGERY | Age: 63
End: 2018-09-27

## 2018-09-27 VITALS
HEART RATE: 73 BPM | BODY MASS INDEX: 40.43 KG/M2 | SYSTOLIC BLOOD PRESSURE: 138 MMHG | WEIGHT: 315 LBS | DIASTOLIC BLOOD PRESSURE: 75 MMHG | HEIGHT: 74 IN

## 2018-09-27 DIAGNOSIS — M48.061 SPINAL STENOSIS OF LUMBAR REGION WITHOUT NEUROGENIC CLAUDICATION: Primary | ICD-10-CM

## 2018-09-27 PROCEDURE — 99024 POSTOP FOLLOW-UP VISIT: CPT | Performed by: PHYSICIAN ASSISTANT

## 2018-09-27 RX ORDER — IRBESARTAN 300 MG/1
300 TABLET ORAL
COMMUNITY
Start: 2018-09-08 | End: 2019-01-02 | Stop reason: SDUPTHER

## 2018-09-27 RX ORDER — NAPROXEN 500 MG/1
500 TABLET ORAL 2 TIMES DAILY WITH MEALS
Qty: 60 TABLET | Refills: 2 | Status: SHIPPED | OUTPATIENT
Start: 2018-09-27 | End: 2019-04-22

## 2018-09-27 RX ORDER — AMLODIPINE BESYLATE 5 MG/1
5 TABLET ORAL DAILY
Status: ON HOLD | COMMUNITY
Start: 2018-09-05 | End: 2019-10-18 | Stop reason: HOSPADM

## 2018-10-15 ENCOUNTER — HOSPITAL ENCOUNTER (OUTPATIENT)
Dept: PHYSICAL THERAPY | Age: 63
Setting detail: THERAPIES SERIES
Discharge: HOME | End: 2018-10-15
Payer: COMMERCIAL

## 2018-10-15 PROCEDURE — 97161 PT EVAL LOW COMPLEX 20 MIN: CPT | Performed by: PHYSICAL THERAPIST

## 2018-10-15 PROCEDURE — G8978 MOBILITY CURRENT STATUS: HCPCS | Performed by: PHYSICAL THERAPIST

## 2018-10-15 PROCEDURE — G8979 MOBILITY GOAL STATUS: HCPCS | Performed by: PHYSICAL THERAPIST

## 2018-10-15 ASSESSMENT — PAIN SCALES - GENERAL: PAINLEVEL_OUTOF10: 8

## 2018-10-15 ASSESSMENT — PAIN DESCRIPTION - FREQUENCY: FREQUENCY: INTERMITTENT

## 2018-10-15 ASSESSMENT — PAIN DESCRIPTION - DESCRIPTORS: DESCRIPTORS: SHARP

## 2018-10-15 ASSESSMENT — PAIN DESCRIPTION - ORIENTATION: ORIENTATION: LOWER

## 2018-10-15 ASSESSMENT — PAIN DESCRIPTION - LOCATION: LOCATION: BACK

## 2018-10-15 ASSESSMENT — PAIN DESCRIPTION - PAIN TYPE: TYPE: SURGICAL PAIN;CHRONIC PAIN

## 2018-10-15 NOTE — PROGRESS NOTES
Physical Therapy  Initial Assessment  Date: 10/15/2018  Patient Name: Latonia Fried  MRN: [id number]  : 1955          Restrictions       Subjective   General  Referring Practitioner: George Jacobson)   Diagnosis: spinal stenosis lumbar region   PT Visit Information  PT Insurance Information: medical mutual   Total # of Visits to Date: 1  Subjective  Subjective: Patient presents to PT s/p L3-S1 laminectomy from 2018. Patient reports prior to back surgery he underwent L ankle sugery due to ankle fracture. Patient states he is currently ambulating SBQC. Gait remains slow per pt due to fear of falls. Pain remains across back region. Symptoms increase with prolonged standing. Numbness/tingling remain across B buttocks regions with symptoms into BLEs. Patient states symptoms have improved. Currently taking naproxen for pain relief.    Pain Screening  Patient Currently in Pain: Yes  Pain Assessment  Pain Assessment: 0-10  Pain Level: 8  Pain Type: Surgical pain;Chronic pain  Pain Location: Back  Pain Orientation: Lower  Pain Descriptors: Sharp  Pain Frequency: Intermittent  Pain Intervention(s): Medication (see eMar)  Vital Signs  Patient Currently in Pain: Yes     Objective     Observation/Palpation  Posture: Fair (fwd trunk posturing )  Palpation: no pain with palpation across back region   Edema: no obvious edema   Scar: incision healed- no redenss/drainage     AROM RLE (degrees)  RLE AROM: WFL  AROM LLE (degrees)  LLE AROM : WFL  AROM RUE (degrees)  RUE AROM : WFL  AROM LUE (degrees)  LUE AROM : WFL  Spine  Lumbar: limited 50% all planes     Strength RLE  Comment: RLE- grossly 4-/5 all planes   Strength LLE  Comment: LLE- grossly 4-/5 all planes   Strength RUE  Strength RUE: WFL  Strength LUE  Strength LUE: WFL  Strength Other  Other: Trunk- grossly 3+/5 all planes         Sensation  Overall Sensation Status: Impaired  Light Touch: Partial deficits in the RLE;Partial deficits in the LLE Ambulation  Ambulation?: Yes  Ambulation 1  Surface: level tile  Device: Small Oziel Pope  Assistance: Modified Independent  Quality of Gait: Patient presents to PT with SBQC; Fwd trunk posturing; limited step length bilaterally with flat foot contact; cane used on approximately every 3-4 steps   Balance  Sitting - Static: Good  Sitting - Dynamic: Good  Standing - Static: Fair  Standing - Dynamic: Fair       Assessment   Conditions Requiring Skilled Therapeutic Intervention  Body structures, Functions, Activity limitations: Decreased functional mobility ; Decreased ROM; Decreased strength  Assessment: pt presents to PT with c/o back pain s/p lumbar laminectomy; limited AROM across trunk; limited strength across trunk/BLEs with hindered posturing/gait   Prognosis: Fair;Good  Decision Making: Low Complexity  Patient Education: pt educated on upright posturing; proper sitting posture   REQUIRES PT FOLLOW UP: Yes         Plan   Plan  Times per week: 2-3x/week x 6 weeks   Current Treatment Recommendations: Strengthening, ROM, Manual Therapy - Soft Tissue Mobilization, Pain Management, Home Exercise Program, Safety Education & Training, Patient/Caregiver Education & Training, Modalities, Gait Training    G-Code  PT G-Codes  Functional Assessment Tool Used: gait   Functional Limitation: Mobility: Walking and moving around  Mobility: Walking and Moving Around Current Status (): At least 20 percent but less than 40 percent impaired, limited or restricted  Mobility: Walking and Moving Around Goal Status ():  At least 1 percent but less than 20 percent impaired, limited or restricted    Goals  Short term goals  Time Frame for Short term goals: 3 weeks   Short term goal 1: increase AROM of trunk to within 75% functional limits  Short term goal 2: increase strength of trunk/BLEs to grossly 4/5 all planes   Short term goal 3: increase ambulation with pt demonstrating increased step length/heel-toe gait progression using AAD   Short term goal 4: decrease pain to < 5/10 across back region with activity   Long term goals  Time Frame for Long term goals : 6 weeks   Long term goal 1: increase AROM of trunk to within 90% functional limits  Long term goal 2: increase strength of trunk/BLEs to grossly 4+/5 all planes   Long term goal 3: increase ambulation with pt demonstrating proper step length/heel-toe gait mechanics using AAD   Long term goal 4: decrease pain to < 3/10 across back region with activity   Long term goal 5: Patient demonstrates independence with HEP   Patient Goals   Patient goals : to reduce back pain       Therapy Time   Individual Concurrent Group Co-treatment   Time In 1500         Time Out 1600         Minutes 60                 Ivan Quinn, PT

## 2018-10-17 ENCOUNTER — HOSPITAL ENCOUNTER (OUTPATIENT)
Age: 63
Discharge: HOME | End: 2018-10-19
Payer: COMMERCIAL

## 2018-10-17 ENCOUNTER — HOSPITAL ENCOUNTER (OUTPATIENT)
Dept: PHYSICAL THERAPY | Age: 63
Setting detail: THERAPIES SERIES
Discharge: HOME | End: 2018-10-17
Payer: COMMERCIAL

## 2018-10-17 PROCEDURE — 97110 THERAPEUTIC EXERCISES: CPT

## 2018-10-18 LAB
ALBUMIN SERPL-MCNC: 4.2 G/DL (ref 3.5–5.2)
ALP BLD-CCNC: 95 U/L (ref 40–129)
ALT SERPL-CCNC: 43 U/L (ref 0–40)
AST SERPL-CCNC: 28 U/L (ref 0–39)
BILIRUB SERPL-MCNC: 0.3 MG/DL (ref 0–1.2)
BILIRUBIN DIRECT: <0.2 MG/DL (ref 0–0.3)
BILIRUBIN, INDIRECT: ABNORMAL MG/DL (ref 0–1)
TOTAL PROTEIN: 7.2 G/DL (ref 6.4–8.3)

## 2018-10-18 PROCEDURE — 80076 HEPATIC FUNCTION PANEL: CPT

## 2018-10-18 PROCEDURE — 36415 COLL VENOUS BLD VENIPUNCTURE: CPT

## 2018-10-23 ENCOUNTER — HOSPITAL ENCOUNTER (OUTPATIENT)
Dept: PHYSICAL THERAPY | Age: 63
Setting detail: THERAPIES SERIES
Discharge: HOME | End: 2018-10-23
Payer: COMMERCIAL

## 2018-10-23 PROCEDURE — 97530 THERAPEUTIC ACTIVITIES: CPT

## 2018-10-23 PROCEDURE — 97110 THERAPEUTIC EXERCISES: CPT

## 2018-10-23 NOTE — PROGRESS NOTES
[facility]  Phone: [phone] Fax: [phone]       Physical Therapy Daily Treatment Note  Date:  10/23/2018    Patient Name:  Latonia Fried    :  1955  MRN: [id number]    Restrictions/Precautions:    Diagnosis: Spinal Stenosis Lumbar Region    Treatment Diagnosis:    Insurance/Certification information:  Medical [place]  Referring Physician: Dr. Duran Forrester)   Plan of care signed (Y/N):    Visit# / total visits:  3/6 weeks  Pain level: denies/10  Time In: 14:00       Time Out: 14:50         Subjective:  Pt reports \"I drove for the first time yesterday! \" Pt has wife drop him off for therapy appointments for safety. Pt is a  and feels \"tight all over\" after back surgery. Exercises:  Exercise/Equipment Resistance/Repetitions Other comments   Bike   nt    Hamstring stretch / knee flex combo 10\" 5 x B At steps    Seated lumbar flexion/rotation 10 x each Big blue   SKTC 10\" 10 x B w/towel assist   LTR 20 x B      Bridge  10x--3 sec hold      SLR 10 x 3 sec hold              Standing calf stretch 10 x 5 sec hold // bars        Standing Hip abd 10x 3 sec hold              Balance and gait mechanics  Step and pause in // bars 3 laps (small steps)     Side stepping // bars 3 laps             Posture education  Thru session-especially standing // bars                                  Other Therapeutic Activities:  Postural education thru session-pt reminded NOT to twist trunk upon entering car. Home Exercise Program: (10/23/2018) Issued 3 activities-Hip Abd 10x - Step with pause for trunk control-lateral side stepping all while using GOOD posture. (10/17/18) Instructed pt in quad sets for home.      Modalities: pt declines  Timed Code Treatment Minutes:  45    Total Treatment Minutes:  50    Treatment/Activity Tolerance:  [] Patient tolerated treatment well [] Patient limited by fatigue  [] Patient limited by pain  [] Patient limited by other medical complications  [x] Other: gait mechanics lack control-practiced step with pause for trunk control-pt very receptive.   Prognosis: [x] Good [x] Fair  [] Poor    Patient Requires Follow-up: [x] Yes  [] No    Plan:   [x] Continue per plan of care [] Alter current plan (see comments)  [] Plan of care initiated [] Hold pending MD visit [] Discharge  Plan for Next Session:         Electronically signed by:  Gabriel Douglass [id number]

## 2018-10-25 ENCOUNTER — HOSPITAL ENCOUNTER (OUTPATIENT)
Dept: PHYSICAL THERAPY | Age: 63
Setting detail: THERAPIES SERIES
Discharge: HOME | End: 2018-10-25
Payer: COMMERCIAL

## 2018-10-25 PROCEDURE — 97530 THERAPEUTIC ACTIVITIES: CPT | Performed by: PHYSICAL THERAPIST

## 2018-10-25 PROCEDURE — 97110 THERAPEUTIC EXERCISES: CPT | Performed by: PHYSICAL THERAPIST

## 2018-10-30 ENCOUNTER — APPOINTMENT (OUTPATIENT)
Dept: PHYSICAL THERAPY | Age: 63
End: 2018-10-30
Payer: COMMERCIAL

## 2018-10-30 ENCOUNTER — HOSPITAL ENCOUNTER (OUTPATIENT)
Dept: PHYSICAL THERAPY | Age: 63
Setting detail: THERAPIES SERIES
Discharge: HOME | End: 2018-10-30
Payer: COMMERCIAL

## 2018-10-30 PROCEDURE — 97530 THERAPEUTIC ACTIVITIES: CPT | Performed by: PHYSICAL THERAPIST

## 2018-10-30 PROCEDURE — 97110 THERAPEUTIC EXERCISES: CPT | Performed by: PHYSICAL THERAPIST

## 2018-11-01 ENCOUNTER — APPOINTMENT (OUTPATIENT)
Dept: PHYSICAL THERAPY | Age: 63
End: 2018-11-01
Payer: COMMERCIAL

## 2018-11-01 ENCOUNTER — HOSPITAL ENCOUNTER (OUTPATIENT)
Dept: PHYSICAL THERAPY | Age: 63
Setting detail: THERAPIES SERIES
Discharge: HOME | End: 2018-11-01
Payer: COMMERCIAL

## 2018-11-01 PROCEDURE — 97110 THERAPEUTIC EXERCISES: CPT | Performed by: PHYSICAL THERAPIST

## 2018-11-01 PROCEDURE — 97530 THERAPEUTIC ACTIVITIES: CPT | Performed by: PHYSICAL THERAPIST

## 2018-11-06 ENCOUNTER — HOSPITAL ENCOUNTER (OUTPATIENT)
Dept: PHYSICAL THERAPY | Age: 63
Setting detail: THERAPIES SERIES
Discharge: HOME | End: 2018-11-06
Payer: COMMERCIAL

## 2018-11-06 PROCEDURE — 97110 THERAPEUTIC EXERCISES: CPT | Performed by: PHYSICAL THERAPIST

## 2018-11-06 PROCEDURE — 97530 THERAPEUTIC ACTIVITIES: CPT | Performed by: PHYSICAL THERAPIST

## 2018-11-08 ENCOUNTER — HOSPITAL ENCOUNTER (OUTPATIENT)
Dept: PHYSICAL THERAPY | Age: 63
Setting detail: THERAPIES SERIES
Discharge: HOME | End: 2018-11-08
Payer: COMMERCIAL

## 2018-11-08 PROCEDURE — 97110 THERAPEUTIC EXERCISES: CPT | Performed by: PHYSICAL THERAPIST

## 2018-11-08 PROCEDURE — 97530 THERAPEUTIC ACTIVITIES: CPT | Performed by: PHYSICAL THERAPIST

## 2018-11-13 ENCOUNTER — HOSPITAL ENCOUNTER (OUTPATIENT)
Dept: PHYSICAL THERAPY | Age: 63
Setting detail: THERAPIES SERIES
End: 2018-11-13
Payer: COMMERCIAL

## 2018-11-13 ENCOUNTER — HOSPITAL ENCOUNTER (OUTPATIENT)
Dept: PHYSICAL THERAPY | Age: 63
Setting detail: THERAPIES SERIES
Discharge: HOME | End: 2018-11-13
Payer: COMMERCIAL

## 2018-11-13 PROCEDURE — 97110 THERAPEUTIC EXERCISES: CPT

## 2018-11-13 PROCEDURE — 97530 THERAPEUTIC ACTIVITIES: CPT

## 2018-11-13 NOTE — PROGRESS NOTES
[facility]  Phone: [phone] Fax: [phone]       Physical Therapy Daily Treatment Note  Date:  2018    Patient Name:  Smooth Cruz    :  1955  MRN: [id number]    Restrictions/Precautions:    Diagnosis: Spinal Stenosis Lumbar Region    Treatment Diagnosis:    Insurance/Certification information:  Medical [place]  Referring Physician: Dr. Monae Juan)   Plan of care signed (Y/N):    Visit# / total visits:  9/6 weeks  Pain level: 2/10  Time In: 14:25      Time Out: 15:10     Subjective:  Pt is happy to report \"I feel more stable on my feet. \"  He admits to walking without sbqc while at home. Exercises:  Exercise/Equipment Resistance/Repetitions Other comments   Bike   nt    Hamstring stretch / knee flex combo 10\" 5 x B At steps    Seated lumbar flexion/rotation 10 x each Big blue   SKTC 10\" 10 x B w/towel assist   LTR 20 x B      Bridge  2x10x--3 sec hold      SLR 2x10 x 3 sec hold              Standing calf stretch 2x10 x 5 sec hold // bars        Standing Hip abd 2x10x 3 sec hold              Side stepping // bars 3 laps             Posture education  Thru session-especially standing // bars                                  Other Therapeutic Activities:      Home Exercise Program: (10/23/2018) Issued 3 activities-Hip Abd 10x - Step with pause for trunk control-lateral side stepping all while using GOOD posture. (10/17/18) Instructed pt in quad sets for home. Modalities: pt declines  Timed Code Treatment Minutes:       Total Treatment Minutes:     Treatment/Activity Tolerance:  [x] Patient tolerated treatment well [] Patient limited by fatigue  [] Patient limited by pain  [] Patient limited by other medical complications  [] Other:  Prognosis: [x] Good [x] Fair  [] Poor    Patient Requires Follow-up: [x] Yes  [] No    Plan:   [x] Continue per plan of care [] Alter current plan (see comments)  [] Plan of care initiated [] Hold pending MD visit []

## 2018-11-15 ENCOUNTER — APPOINTMENT (OUTPATIENT)
Dept: PHYSICAL THERAPY | Age: 63
End: 2018-11-15
Payer: COMMERCIAL

## 2018-11-15 ENCOUNTER — HOSPITAL ENCOUNTER (OUTPATIENT)
Dept: PHYSICAL THERAPY | Age: 63
Setting detail: THERAPIES SERIES
Discharge: HOME | End: 2018-11-15
Payer: COMMERCIAL

## 2018-11-15 PROCEDURE — 97110 THERAPEUTIC EXERCISES: CPT | Performed by: PHYSICAL THERAPIST

## 2018-11-15 PROCEDURE — 97530 THERAPEUTIC ACTIVITIES: CPT | Performed by: PHYSICAL THERAPIST

## 2018-11-15 NOTE — PROGRESS NOTES
[facility]  Phone: [phone] Fax: [phone]       Physical Therapy Daily Treatment Note  Date:  11/15/2018    Patient Name:  Kassidy Angeles    :  1955  MRN: [id number]    Restrictions/Precautions:    Diagnosis: Spinal Stenosis Lumbar Region    Treatment Diagnosis:    Insurance/Certification information:  Medical [place]  Referring Physician: Dr. Es Chavez)   Plan of care signed (Y/N):    Visit# / total visits:  10/6 weeks  Pain level: 2/10  Time In: 14:25      Time Out: 15:10     Subjective:  Pt reports feeling better daily. Notices small improvements each day. Exercises:  Exercise/Equipment Resistance/Repetitions Other comments   Bike   nt    Hamstring stretch / knee flex combo 10\" 5 x B At steps    Seated lumbar flexion/rotation 10 x each Big blue   SKTC 10\" 10 x B w/towel assist   LTR 20 x B      Bridge  2x10x--3 sec hold      SLR 2x10 x 3 sec hold              Standing calf stretch 2x10 x 5 sec hold // bars        Standing Hip abd 2x10x 3 sec hold              Side stepping // bars 3 laps             Posture education  Thru session-especially standing // bars                                  Other Therapeutic Activities:      Home Exercise Program: (10/23/2018) Issued 3 activities-Hip Abd 10x - Step with pause for trunk control-lateral side stepping all while using GOOD posture. (10/17/18) Instructed pt in quad sets for home. Modalities: pt declines  Timed Code Treatment Minutes: Total Treatment Minutes:     Treatment/Activity Tolerance:  [x] Patient tolerated treatment well [] Patient limited by fatigue  [] Patient limited by pain  [] Patient limited by other medical complications  [] Other: Pt cont to show GOOD progress. Pt is independent with HEP. Gait is stable with slight deviation using quad cane. Increased deviation remains without AD.  Pain remains minimal at 2/10 across back region   Prognosis: [x] Good [x] Fair  [] Poor    Patient Requires

## 2018-11-19 ENCOUNTER — HOSPITAL ENCOUNTER (OUTPATIENT)
Age: 63
Discharge: HOME | End: 2018-11-21
Payer: COMMERCIAL

## 2018-11-19 PROCEDURE — 80076 HEPATIC FUNCTION PANEL: CPT

## 2018-11-20 ENCOUNTER — HOSPITAL ENCOUNTER (OUTPATIENT)
Dept: PHYSICAL THERAPY | Age: 63
Setting detail: THERAPIES SERIES
Discharge: HOME | End: 2018-11-20
Payer: COMMERCIAL

## 2018-11-20 LAB
ALBUMIN SERPL-MCNC: 4.4 G/DL (ref 3.5–5.2)
ALP BLD-CCNC: 94 U/L (ref 40–129)
ALT SERPL-CCNC: 46 U/L (ref 0–40)
AST SERPL-CCNC: 26 U/L (ref 0–39)
BILIRUB SERPL-MCNC: 0.4 MG/DL (ref 0–1.2)
BILIRUBIN DIRECT: <0.2 MG/DL (ref 0–0.3)
BILIRUBIN, INDIRECT: ABNORMAL MG/DL (ref 0–1)
TOTAL PROTEIN: 7.1 G/DL (ref 6.4–8.3)

## 2018-11-20 PROCEDURE — 97110 THERAPEUTIC EXERCISES: CPT

## 2018-11-27 ENCOUNTER — HOSPITAL ENCOUNTER (OUTPATIENT)
Dept: PHYSICAL THERAPY | Age: 63
Setting detail: THERAPIES SERIES
Discharge: HOME | End: 2018-11-27
Payer: COMMERCIAL

## 2018-11-27 PROCEDURE — 97530 THERAPEUTIC ACTIVITIES: CPT | Performed by: PHYSICAL THERAPIST

## 2018-11-27 PROCEDURE — 97110 THERAPEUTIC EXERCISES: CPT | Performed by: PHYSICAL THERAPIST

## 2018-11-29 ENCOUNTER — HOSPITAL ENCOUNTER (OUTPATIENT)
Dept: PHYSICAL THERAPY | Age: 63
Setting detail: THERAPIES SERIES
Discharge: HOME | End: 2018-11-29
Payer: COMMERCIAL

## 2018-11-29 PROCEDURE — 97110 THERAPEUTIC EXERCISES: CPT | Performed by: PHYSICAL THERAPIST

## 2018-11-29 PROCEDURE — 97530 THERAPEUTIC ACTIVITIES: CPT | Performed by: PHYSICAL THERAPIST

## 2019-01-14 ENCOUNTER — HOSPITAL ENCOUNTER (OUTPATIENT)
Age: 64
Discharge: HOME | End: 2019-01-16
Payer: COMMERCIAL

## 2019-01-14 DIAGNOSIS — B35.1 ONYCHOMYCOSIS DUE TO DERMATOPHYTE: ICD-10-CM

## 2019-01-14 PROBLEM — B96.89 ACUTE BACTERIAL SINUSITIS: Status: ACTIVE | Noted: 2019-01-14

## 2019-01-14 PROBLEM — J01.90 ACUTE BACTERIAL SINUSITIS: Status: ACTIVE | Noted: 2019-01-14

## 2019-01-14 LAB
ALBUMIN SERPL-MCNC: 4.5 G/DL (ref 3.5–5.2)
ALP BLD-CCNC: 92 U/L (ref 40–129)
ALT SERPL-CCNC: 41 U/L (ref 0–40)
AST SERPL-CCNC: 25 U/L (ref 0–39)
BILIRUB SERPL-MCNC: 0.5 MG/DL (ref 0–1.2)
BILIRUBIN DIRECT: <0.2 MG/DL (ref 0–0.3)
BILIRUBIN, INDIRECT: ABNORMAL MG/DL (ref 0–1)
TOTAL PROTEIN: 7.4 G/DL (ref 6.4–8.3)

## 2019-01-14 PROCEDURE — 80076 HEPATIC FUNCTION PANEL: CPT

## 2019-01-30 ENCOUNTER — TELEPHONE (OUTPATIENT)
Dept: NEUROSURGERY | Age: 64
End: 2019-01-30

## 2019-01-30 DIAGNOSIS — Z98.890 S/P LAMINECTOMY: Primary | ICD-10-CM

## 2019-02-13 ENCOUNTER — HOSPITAL ENCOUNTER (OUTPATIENT)
Dept: PHYSICAL THERAPY | Age: 64
Setting detail: THERAPIES SERIES
Discharge: HOME | End: 2019-02-13
Payer: COMMERCIAL

## 2019-02-13 PROCEDURE — 97161 PT EVAL LOW COMPLEX 20 MIN: CPT | Performed by: PHYSICAL THERAPIST

## 2019-02-13 ASSESSMENT — PAIN SCALES - GENERAL: PAINLEVEL_OUTOF10: 8

## 2019-02-13 ASSESSMENT — PAIN DESCRIPTION - DESCRIPTORS: DESCRIPTORS: ACHING

## 2019-02-13 ASSESSMENT — PAIN DESCRIPTION - FREQUENCY: FREQUENCY: INTERMITTENT

## 2019-02-13 ASSESSMENT — PAIN DESCRIPTION - PAIN TYPE: TYPE: CHRONIC PAIN;SURGICAL PAIN

## 2019-02-13 ASSESSMENT — PAIN DESCRIPTION - LOCATION: LOCATION: BACK

## 2019-02-13 ASSESSMENT — PAIN DESCRIPTION - ORIENTATION: ORIENTATION: LOWER

## 2019-02-19 ENCOUNTER — HOSPITAL ENCOUNTER (OUTPATIENT)
Dept: PHYSICAL THERAPY | Age: 64
Setting detail: THERAPIES SERIES
Discharge: HOME | End: 2019-02-19
Payer: COMMERCIAL

## 2019-02-19 PROCEDURE — 97110 THERAPEUTIC EXERCISES: CPT | Performed by: PHYSICAL THERAPIST

## 2019-02-19 PROCEDURE — 97530 THERAPEUTIC ACTIVITIES: CPT | Performed by: PHYSICAL THERAPIST

## 2019-02-21 ENCOUNTER — HOSPITAL ENCOUNTER (OUTPATIENT)
Dept: PHYSICAL THERAPY | Age: 64
Setting detail: THERAPIES SERIES
Discharge: HOME | End: 2019-02-21
Payer: COMMERCIAL

## 2019-02-21 PROCEDURE — 97530 THERAPEUTIC ACTIVITIES: CPT | Performed by: PHYSICAL THERAPIST

## 2019-02-21 PROCEDURE — 97110 THERAPEUTIC EXERCISES: CPT | Performed by: PHYSICAL THERAPIST

## 2019-02-26 ENCOUNTER — HOSPITAL ENCOUNTER (OUTPATIENT)
Dept: PHYSICAL THERAPY | Age: 64
Setting detail: THERAPIES SERIES
Discharge: HOME | End: 2019-02-26
Payer: COMMERCIAL

## 2019-02-26 PROCEDURE — 97530 THERAPEUTIC ACTIVITIES: CPT | Performed by: PHYSICAL THERAPIST

## 2019-02-26 PROCEDURE — 97110 THERAPEUTIC EXERCISES: CPT | Performed by: PHYSICAL THERAPIST

## 2019-02-28 ENCOUNTER — APPOINTMENT (OUTPATIENT)
Dept: PHYSICAL THERAPY | Age: 64
End: 2019-02-28
Payer: COMMERCIAL

## 2019-02-28 ENCOUNTER — HOSPITAL ENCOUNTER (OUTPATIENT)
Dept: PHYSICAL THERAPY | Age: 64
Setting detail: THERAPIES SERIES
Discharge: HOME | End: 2019-02-28
Payer: COMMERCIAL

## 2019-02-28 PROCEDURE — 97110 THERAPEUTIC EXERCISES: CPT | Performed by: PHYSICAL THERAPIST

## 2019-02-28 PROCEDURE — 97530 THERAPEUTIC ACTIVITIES: CPT | Performed by: PHYSICAL THERAPIST

## 2019-03-05 ENCOUNTER — HOSPITAL ENCOUNTER (OUTPATIENT)
Dept: PHYSICAL THERAPY | Age: 64
Setting detail: THERAPIES SERIES
Discharge: HOME | End: 2019-03-05
Payer: COMMERCIAL

## 2019-03-05 PROCEDURE — 97110 THERAPEUTIC EXERCISES: CPT | Performed by: PHYSICAL THERAPIST

## 2019-03-05 PROCEDURE — 97530 THERAPEUTIC ACTIVITIES: CPT | Performed by: PHYSICAL THERAPIST

## 2019-03-07 ENCOUNTER — HOSPITAL ENCOUNTER (OUTPATIENT)
Dept: PHYSICAL THERAPY | Age: 64
Setting detail: THERAPIES SERIES
Discharge: HOME | End: 2019-03-07
Payer: COMMERCIAL

## 2019-03-07 PROCEDURE — 97530 THERAPEUTIC ACTIVITIES: CPT | Performed by: PHYSICAL THERAPIST

## 2019-03-07 PROCEDURE — 97110 THERAPEUTIC EXERCISES: CPT | Performed by: PHYSICAL THERAPIST

## 2019-03-11 ENCOUNTER — HOSPITAL ENCOUNTER (OUTPATIENT)
Dept: PHYSICAL THERAPY | Age: 64
Setting detail: THERAPIES SERIES
End: 2019-03-11
Payer: COMMERCIAL

## 2019-03-11 ENCOUNTER — HOSPITAL ENCOUNTER (OUTPATIENT)
Age: 64
Discharge: HOME | End: 2019-03-13
Payer: COMMERCIAL

## 2019-03-11 DIAGNOSIS — B35.1 ONYCHOMYCOSIS DUE TO DERMATOPHYTE: ICD-10-CM

## 2019-03-11 PROBLEM — Z98.890 STATUS POST LUMBAR LAMINECTOMY: Status: ACTIVE | Noted: 2019-03-11

## 2019-03-12 ENCOUNTER — HOSPITAL ENCOUNTER (OUTPATIENT)
Dept: PHYSICAL THERAPY | Age: 64
Setting detail: THERAPIES SERIES
Discharge: HOME | End: 2019-03-12
Payer: COMMERCIAL

## 2019-03-12 LAB
ALBUMIN SERPL-MCNC: 4.3 G/DL (ref 3.5–5.2)
ALP BLD-CCNC: 101 U/L (ref 40–129)
ALT SERPL-CCNC: 54 U/L (ref 0–40)
AST SERPL-CCNC: 35 U/L (ref 0–39)
BILIRUB SERPL-MCNC: 0.6 MG/DL (ref 0–1.2)
BILIRUBIN DIRECT: <0.2 MG/DL (ref 0–0.3)
BILIRUBIN, INDIRECT: ABNORMAL MG/DL (ref 0–1)
TOTAL PROTEIN: 7.3 G/DL (ref 6.4–8.3)

## 2019-03-12 PROCEDURE — 97530 THERAPEUTIC ACTIVITIES: CPT | Performed by: PHYSICAL THERAPIST

## 2019-03-12 PROCEDURE — 97110 THERAPEUTIC EXERCISES: CPT | Performed by: PHYSICAL THERAPIST

## 2019-03-19 ENCOUNTER — HOSPITAL ENCOUNTER (OUTPATIENT)
Dept: PHYSICAL THERAPY | Age: 64
Setting detail: THERAPIES SERIES
Discharge: HOME | End: 2019-03-19
Payer: COMMERCIAL

## 2019-03-19 PROCEDURE — 97530 THERAPEUTIC ACTIVITIES: CPT | Performed by: PHYSICAL THERAPIST

## 2019-03-19 PROCEDURE — 97110 THERAPEUTIC EXERCISES: CPT | Performed by: PHYSICAL THERAPIST

## 2019-03-21 ENCOUNTER — HOSPITAL ENCOUNTER (OUTPATIENT)
Dept: PHYSICAL THERAPY | Age: 64
Setting detail: THERAPIES SERIES
Discharge: HOME | End: 2019-03-21
Payer: COMMERCIAL

## 2019-03-21 PROCEDURE — 97530 THERAPEUTIC ACTIVITIES: CPT | Performed by: PHYSICAL THERAPIST

## 2019-03-21 PROCEDURE — 97110 THERAPEUTIC EXERCISES: CPT | Performed by: PHYSICAL THERAPIST

## 2019-03-26 ENCOUNTER — HOSPITAL ENCOUNTER (OUTPATIENT)
Dept: PHYSICAL THERAPY | Age: 64
Setting detail: THERAPIES SERIES
Discharge: HOME | End: 2019-03-26
Payer: COMMERCIAL

## 2019-03-26 PROCEDURE — 97530 THERAPEUTIC ACTIVITIES: CPT | Performed by: PHYSICAL THERAPIST

## 2019-03-26 PROCEDURE — 97110 THERAPEUTIC EXERCISES: CPT | Performed by: PHYSICAL THERAPIST

## 2019-03-28 ENCOUNTER — HOSPITAL ENCOUNTER (OUTPATIENT)
Dept: PHYSICAL THERAPY | Age: 64
Setting detail: THERAPIES SERIES
Discharge: HOME | End: 2019-03-28
Payer: COMMERCIAL

## 2019-03-28 PROCEDURE — 97530 THERAPEUTIC ACTIVITIES: CPT | Performed by: PHYSICAL THERAPIST

## 2019-03-28 PROCEDURE — 97110 THERAPEUTIC EXERCISES: CPT | Performed by: PHYSICAL THERAPIST

## 2019-04-15 ENCOUNTER — TELEPHONE (OUTPATIENT)
Dept: NEUROSURGERY | Age: 64
End: 2019-04-15

## 2019-04-15 DIAGNOSIS — M48.061 DEGENERATIVE LUMBAR SPINAL STENOSIS: Primary | ICD-10-CM

## 2019-04-22 PROBLEM — J20.8 ACUTE BRONCHITIS DUE TO OTHER SPECIFIED ORGANISMS: Status: ACTIVE | Noted: 2019-04-22

## 2019-05-13 PROBLEM — L23.89 ALLERGIC CONTACT DERMATITIS DUE TO OTHER AGENTS: Status: ACTIVE | Noted: 2019-05-13

## 2019-05-14 ENCOUNTER — HOSPITAL ENCOUNTER (OUTPATIENT)
Dept: ULTRASOUND IMAGING | Age: 64
Discharge: HOME | End: 2019-05-16
Payer: COMMERCIAL

## 2019-05-14 DIAGNOSIS — M79.661 RIGHT CALF PAIN: ICD-10-CM

## 2019-05-14 PROCEDURE — 93971 EXTREMITY STUDY: CPT

## 2019-06-10 PROBLEM — Z98.890 S/P LUMBAR LAMINECTOMY: Status: ACTIVE | Noted: 2019-06-10

## 2019-06-27 ENCOUNTER — OFFICE VISIT (OUTPATIENT)
Dept: NEUROSURGERY | Age: 64
End: 2019-06-27
Payer: COMMERCIAL

## 2019-06-27 ENCOUNTER — HOSPITAL ENCOUNTER (OUTPATIENT)
Dept: GENERAL RADIOLOGY | Age: 64
Discharge: HOME | End: 2019-06-29
Payer: COMMERCIAL

## 2019-06-27 ENCOUNTER — HOSPITAL ENCOUNTER (OUTPATIENT)
Age: 64
Discharge: HOME | End: 2019-06-29
Payer: COMMERCIAL

## 2019-06-27 VITALS
OXYGEN SATURATION: 92 % | RESPIRATION RATE: 16 BRPM | BODY MASS INDEX: 31.44 KG/M2 | HEIGHT: 74 IN | HEART RATE: 78 BPM | WEIGHT: 245 LBS | DIASTOLIC BLOOD PRESSURE: 75 MMHG | SYSTOLIC BLOOD PRESSURE: 111 MMHG

## 2019-06-27 DIAGNOSIS — M51.36 LUMBAR DEGENERATIVE DISC DISEASE: ICD-10-CM

## 2019-06-27 DIAGNOSIS — M51.36 LUMBAR DEGENERATIVE DISC DISEASE: Primary | ICD-10-CM

## 2019-06-27 PROCEDURE — 1036F TOBACCO NON-USER: CPT | Performed by: PHYSICIAN ASSISTANT

## 2019-06-27 PROCEDURE — 99212 OFFICE O/P EST SF 10 MIN: CPT | Performed by: PHYSICIAN ASSISTANT

## 2019-06-27 PROCEDURE — G8427 DOCREV CUR MEDS BY ELIG CLIN: HCPCS | Performed by: PHYSICIAN ASSISTANT

## 2019-06-27 PROCEDURE — G8417 CALC BMI ABV UP PARAM F/U: HCPCS | Performed by: PHYSICIAN ASSISTANT

## 2019-06-27 PROCEDURE — 72120 X-RAY BEND ONLY L-S SPINE: CPT

## 2019-06-27 PROCEDURE — 3017F COLORECTAL CA SCREEN DOC REV: CPT | Performed by: PHYSICIAN ASSISTANT

## 2019-06-27 SDOH — HEALTH STABILITY: MENTAL HEALTH: HOW MANY STANDARD DRINKS CONTAINING ALCOHOL DO YOU HAVE ON A TYPICAL DAY?: 5 OR 6

## 2019-06-27 SDOH — HEALTH STABILITY: MENTAL HEALTH: HOW OFTEN DO YOU HAVE A DRINK CONTAINING ALCOHOL?: 4 OR MORE TIMES A WEEK

## 2019-08-20 PROBLEM — S39.012A LUMBOSACRAL STRAIN: Status: ACTIVE | Noted: 2019-08-20

## 2019-10-14 ENCOUNTER — HOSPITAL ENCOUNTER (INPATIENT)
Age: 64
LOS: 4 days | Discharge: HOME HEALTH | DRG: 191 | End: 2019-10-18
Attending: EMERGENCY MEDICINE | Admitting: INTERNAL MEDICINE
Payer: MEDICARE

## 2019-10-14 ENCOUNTER — APPOINTMENT (OUTPATIENT)
Dept: GENERAL RADIOLOGY | Age: 64
DRG: 191 | End: 2019-10-14
Payer: MEDICARE

## 2019-10-14 DIAGNOSIS — J44.1 COPD EXACERBATION (HCC): Primary | ICD-10-CM

## 2019-10-14 DIAGNOSIS — I48.91 ATRIAL FIBRILLATION WITH RVR (HCC): ICD-10-CM

## 2019-10-14 LAB
ANION GAP SERPL CALCULATED.3IONS-SCNC: 11 MMOL/L (ref 7–16)
BASOPHILS ABSOLUTE: 0.09 E9/L (ref 0–0.2)
BASOPHILS RELATIVE PERCENT: 1 % (ref 0–2)
BUN BLDV-MCNC: 10 MG/DL (ref 8–23)
CALCIUM SERPL-MCNC: 9.5 MG/DL (ref 8.6–10.2)
CHLORIDE BLD-SCNC: 103 MMOL/L (ref 98–107)
CO2: 25 MMOL/L (ref 22–29)
CREAT SERPL-MCNC: 1 MG/DL (ref 0.7–1.2)
EOSINOPHILS ABSOLUTE: 0.82 E9/L (ref 0.05–0.5)
EOSINOPHILS RELATIVE PERCENT: 9.2 % (ref 0–6)
GFR AFRICAN AMERICAN: >60
GFR NON-AFRICAN AMERICAN: >60 ML/MIN/1.73
GLUCOSE BLD-MCNC: 129 MG/DL (ref 74–99)
HCT VFR BLD CALC: 50.6 % (ref 37–54)
HEMOGLOBIN: 16.8 G/DL (ref 12.5–16.5)
IMMATURE GRANULOCYTES #: 0.03 E9/L
IMMATURE GRANULOCYTES %: 0.3 % (ref 0–5)
LYMPHOCYTES ABSOLUTE: 2.11 E9/L (ref 1.5–4)
LYMPHOCYTES RELATIVE PERCENT: 23.7 % (ref 20–42)
MCH RBC QN AUTO: 32.5 PG (ref 26–35)
MCHC RBC AUTO-ENTMCNC: 33.2 % (ref 32–34.5)
MCV RBC AUTO: 97.9 FL (ref 80–99.9)
MONOCYTES ABSOLUTE: 0.86 E9/L (ref 0.1–0.95)
MONOCYTES RELATIVE PERCENT: 9.7 % (ref 2–12)
NEUTROPHILS ABSOLUTE: 4.98 E9/L (ref 1.8–7.3)
NEUTROPHILS RELATIVE PERCENT: 56.1 % (ref 43–80)
PDW BLD-RTO: 13.5 FL (ref 11.5–15)
PLATELET # BLD: 241 E9/L (ref 130–450)
PMV BLD AUTO: 9.6 FL (ref 7–12)
POTASSIUM REFLEX MAGNESIUM: 4.9 MMOL/L (ref 3.5–5)
PRO-BNP: 434 PG/ML (ref 0–125)
RBC # BLD: 5.17 E12/L (ref 3.8–5.8)
SODIUM BLD-SCNC: 139 MMOL/L (ref 132–146)
TROPONIN: <0.01 NG/ML (ref 0–0.03)
WBC # BLD: 8.9 E9/L (ref 4.5–11.5)

## 2019-10-14 PROCEDURE — 96374 THER/PROPH/DIAG INJ IV PUSH: CPT

## 2019-10-14 PROCEDURE — 2500000003 HC RX 250 WO HCPCS: Performed by: INTERNAL MEDICINE

## 2019-10-14 PROCEDURE — 6370000000 HC RX 637 (ALT 250 FOR IP): Performed by: NURSE PRACTITIONER

## 2019-10-14 PROCEDURE — 71046 X-RAY EXAM CHEST 2 VIEWS: CPT

## 2019-10-14 PROCEDURE — 6370000000 HC RX 637 (ALT 250 FOR IP): Performed by: INTERNAL MEDICINE

## 2019-10-14 PROCEDURE — 85025 COMPLETE CBC W/AUTO DIFF WBC: CPT

## 2019-10-14 PROCEDURE — 6360000002 HC RX W HCPCS: Performed by: EMERGENCY MEDICINE

## 2019-10-14 PROCEDURE — 2060000000 HC ICU INTERMEDIATE R&B

## 2019-10-14 PROCEDURE — 6370000000 HC RX 637 (ALT 250 FOR IP): Performed by: EMERGENCY MEDICINE

## 2019-10-14 PROCEDURE — 2580000003 HC RX 258: Performed by: EMERGENCY MEDICINE

## 2019-10-14 PROCEDURE — 6360000002 HC RX W HCPCS: Performed by: NURSE PRACTITIONER

## 2019-10-14 PROCEDURE — 84484 ASSAY OF TROPONIN QUANT: CPT

## 2019-10-14 PROCEDURE — 93005 ELECTROCARDIOGRAM TRACING: CPT | Performed by: EMERGENCY MEDICINE

## 2019-10-14 PROCEDURE — 2580000003 HC RX 258: Performed by: INTERNAL MEDICINE

## 2019-10-14 PROCEDURE — 94664 DEMO&/EVAL PT USE INHALER: CPT

## 2019-10-14 PROCEDURE — 80048 BASIC METABOLIC PNL TOTAL CA: CPT

## 2019-10-14 PROCEDURE — 83880 ASSAY OF NATRIURETIC PEPTIDE: CPT

## 2019-10-14 PROCEDURE — 94640 AIRWAY INHALATION TREATMENT: CPT

## 2019-10-14 PROCEDURE — 94760 N-INVAS EAR/PLS OXIMETRY 1: CPT

## 2019-10-14 PROCEDURE — 99285 EMERGENCY DEPT VISIT HI MDM: CPT

## 2019-10-14 RX ORDER — DILTIAZEM HYDROCHLORIDE 5 MG/ML
15 INJECTION INTRAVENOUS ONCE
Status: COMPLETED | OUTPATIENT
Start: 2019-10-14 | End: 2019-10-14

## 2019-10-14 RX ORDER — SODIUM CHLORIDE 0.9 % (FLUSH) 0.9 %
10 SYRINGE (ML) INJECTION PRN
Status: DISCONTINUED | OUTPATIENT
Start: 2019-10-14 | End: 2019-10-18 | Stop reason: HOSPADM

## 2019-10-14 RX ORDER — METHYLPREDNISOLONE SODIUM SUCCINATE 125 MG/2ML
80 INJECTION, POWDER, LYOPHILIZED, FOR SOLUTION INTRAMUSCULAR; INTRAVENOUS ONCE
Status: COMPLETED | OUTPATIENT
Start: 2019-10-14 | End: 2019-10-14

## 2019-10-14 RX ORDER — ALBUTEROL SULFATE 90 UG/1
1 AEROSOL, METERED RESPIRATORY (INHALATION) EVERY 4 HOURS PRN
Status: DISCONTINUED | OUTPATIENT
Start: 2019-10-14 | End: 2019-10-18 | Stop reason: HOSPADM

## 2019-10-14 RX ORDER — AMLODIPINE BESYLATE 5 MG/1
5 TABLET ORAL DAILY
Status: DISCONTINUED | OUTPATIENT
Start: 2019-10-14 | End: 2019-10-15

## 2019-10-14 RX ORDER — SODIUM CHLORIDE 0.9 % (FLUSH) 0.9 %
10 SYRINGE (ML) INJECTION EVERY 12 HOURS SCHEDULED
Status: DISCONTINUED | OUTPATIENT
Start: 2019-10-14 | End: 2019-10-18 | Stop reason: HOSPADM

## 2019-10-14 RX ORDER — LOSARTAN POTASSIUM 50 MG/1
100 TABLET ORAL DAILY
Status: DISCONTINUED | OUTPATIENT
Start: 2019-10-14 | End: 2019-10-18 | Stop reason: HOSPADM

## 2019-10-14 RX ORDER — M-VIT,TX,IRON,MINS/CALC/FOLIC 27MG-0.4MG
1 TABLET ORAL DAILY
Status: DISCONTINUED | OUTPATIENT
Start: 2019-10-14 | End: 2019-10-18 | Stop reason: HOSPADM

## 2019-10-14 RX ORDER — IPRATROPIUM BROMIDE AND ALBUTEROL SULFATE 2.5; .5 MG/3ML; MG/3ML
3 SOLUTION RESPIRATORY (INHALATION) ONCE
Status: COMPLETED | OUTPATIENT
Start: 2019-10-14 | End: 2019-10-14

## 2019-10-14 RX ORDER — ACETAMINOPHEN 325 MG/1
650 TABLET ORAL EVERY 4 HOURS PRN
Status: DISCONTINUED | OUTPATIENT
Start: 2019-10-14 | End: 2019-10-18 | Stop reason: HOSPADM

## 2019-10-14 RX ORDER — DILTIAZEM HYDROCHLORIDE 5 MG/ML
15 INJECTION INTRAVENOUS ONCE
Status: DISCONTINUED | OUTPATIENT
Start: 2019-10-14 | End: 2019-10-14 | Stop reason: SDUPTHER

## 2019-10-14 RX ORDER — 0.9 % SODIUM CHLORIDE 0.9 %
500 INTRAVENOUS SOLUTION INTRAVENOUS ONCE
Status: COMPLETED | OUTPATIENT
Start: 2019-10-14 | End: 2019-10-14

## 2019-10-14 RX ADMIN — METOPROLOL TARTRATE 25 MG: 25 TABLET ORAL at 20:41

## 2019-10-14 RX ADMIN — DILTIAZEM HYDROCHLORIDE 5 MG/HR: 5 INJECTION INTRAVENOUS at 18:46

## 2019-10-14 RX ADMIN — DILTIAZEM HYDROCHLORIDE 15 MG: 5 INJECTION INTRAVENOUS at 18:46

## 2019-10-14 RX ADMIN — IPRATROPIUM BROMIDE AND ALBUTEROL SULFATE 3 AMPULE: .5; 3 SOLUTION RESPIRATORY (INHALATION) at 13:05

## 2019-10-14 RX ADMIN — SODIUM CHLORIDE 500 ML: 9 INJECTION, SOLUTION INTRAVENOUS at 13:06

## 2019-10-14 RX ADMIN — ALBUTEROL SULFATE 1 PUFF: 90 AEROSOL, METERED RESPIRATORY (INHALATION) at 20:06

## 2019-10-14 RX ADMIN — Medication 10 ML: at 20:42

## 2019-10-14 RX ADMIN — ENOXAPARIN SODIUM 150 MG: 150 INJECTION SUBCUTANEOUS at 18:11

## 2019-10-14 RX ADMIN — METHYLPREDNISOLONE SODIUM SUCCINATE 80 MG: 125 INJECTION, POWDER, FOR SOLUTION INTRAMUSCULAR; INTRAVENOUS at 13:06

## 2019-10-14 RX ADMIN — Medication 1 TABLET: at 18:11

## 2019-10-15 LAB
ALBUMIN SERPL-MCNC: 4.5 G/DL (ref 3.5–5.2)
ALP BLD-CCNC: 96 U/L (ref 40–129)
ALT SERPL-CCNC: 40 U/L (ref 0–40)
ANION GAP SERPL CALCULATED.3IONS-SCNC: 12 MMOL/L (ref 7–16)
AST SERPL-CCNC: 23 U/L (ref 0–39)
BILIRUB SERPL-MCNC: 0.3 MG/DL (ref 0–1.2)
BILIRUBIN DIRECT: <0.2 MG/DL (ref 0–0.3)
BILIRUBIN, INDIRECT: NORMAL MG/DL (ref 0–1)
BUN BLDV-MCNC: 12 MG/DL (ref 8–23)
CALCIUM SERPL-MCNC: 9.7 MG/DL (ref 8.6–10.2)
CHLORIDE BLD-SCNC: 97 MMOL/L (ref 98–107)
CHOLESTEROL, TOTAL: 189 MG/DL (ref 0–199)
CO2: 26 MMOL/L (ref 22–29)
CREAT SERPL-MCNC: 1 MG/DL (ref 0.7–1.2)
FILM ARRAY ADENOVIRUS: NORMAL
FILM ARRAY BORDETELLA PERTUSSIS: NORMAL
FILM ARRAY CHLAMYDOPHILIA PNEUMONIAE: NORMAL
FILM ARRAY CORONAVIRUS 229E: NORMAL
FILM ARRAY CORONAVIRUS HKU1: NORMAL
FILM ARRAY CORONAVIRUS NL63: NORMAL
FILM ARRAY CORONAVIRUS OC43: NORMAL
FILM ARRAY INFLUENZA A VIRUS 09H1: NORMAL
FILM ARRAY INFLUENZA A VIRUS H1: NORMAL
FILM ARRAY INFLUENZA A VIRUS H3: NORMAL
FILM ARRAY INFLUENZA A VIRUS: NORMAL
FILM ARRAY INFLUENZA B: NORMAL
FILM ARRAY METAPNEUMOVIRUS: NORMAL
FILM ARRAY MYCOPLASMA PNEUMONIAE: NORMAL
FILM ARRAY PARAINFLUENZA VIRUS 1: NORMAL
FILM ARRAY PARAINFLUENZA VIRUS 2: NORMAL
FILM ARRAY PARAINFLUENZA VIRUS 3: NORMAL
FILM ARRAY PARAINFLUENZA VIRUS 4: NORMAL
FILM ARRAY RESPIRATORY SYNCITIAL VIRUS: NORMAL
FILM ARRAY RHINOVIRUS/ENTEROVIRUS: NORMAL
GFR AFRICAN AMERICAN: >60
GFR NON-AFRICAN AMERICAN: >60 ML/MIN/1.73
GLUCOSE BLD-MCNC: 138 MG/DL (ref 74–99)
HBA1C MFR BLD: 5.9 % (ref 4–5.6)
HDLC SERPL-MCNC: 89 MG/DL
LDL CHOLESTEROL CALCULATED: 83 MG/DL (ref 0–99)
LV EF: 61 %
LVEF MODALITY: NORMAL
POTASSIUM REFLEX MAGNESIUM: 4.6 MMOL/L (ref 3.5–5)
SODIUM BLD-SCNC: 135 MMOL/L (ref 132–146)
T4 FREE: 1.25 NG/DL (ref 0.93–1.7)
TOTAL PROTEIN: 7.6 G/DL (ref 6.4–8.3)
TRIGL SERPL-MCNC: 86 MG/DL (ref 0–149)
TSH SERPL DL<=0.05 MIU/L-ACNC: 0.51 UIU/ML (ref 0.27–4.2)
VLDLC SERPL CALC-MCNC: 17 MG/DL

## 2019-10-15 PROCEDURE — 36415 COLL VENOUS BLD VENIPUNCTURE: CPT

## 2019-10-15 PROCEDURE — 6360000002 HC RX W HCPCS: Performed by: EMERGENCY MEDICINE

## 2019-10-15 PROCEDURE — 87633 RESP VIRUS 12-25 TARGETS: CPT

## 2019-10-15 PROCEDURE — 80061 LIPID PANEL: CPT

## 2019-10-15 PROCEDURE — 6370000000 HC RX 637 (ALT 250 FOR IP): Performed by: INTERNAL MEDICINE

## 2019-10-15 PROCEDURE — 94640 AIRWAY INHALATION TREATMENT: CPT

## 2019-10-15 PROCEDURE — 6370000000 HC RX 637 (ALT 250 FOR IP): Performed by: EMERGENCY MEDICINE

## 2019-10-15 PROCEDURE — 6370000000 HC RX 637 (ALT 250 FOR IP): Performed by: NURSE PRACTITIONER

## 2019-10-15 PROCEDURE — 2500000003 HC RX 250 WO HCPCS: Performed by: NURSE PRACTITIONER

## 2019-10-15 PROCEDURE — 2580000003 HC RX 258: Performed by: EMERGENCY MEDICINE

## 2019-10-15 PROCEDURE — 93306 TTE W/DOPPLER COMPLETE: CPT

## 2019-10-15 PROCEDURE — 6360000002 HC RX W HCPCS: Performed by: NURSE PRACTITIONER

## 2019-10-15 PROCEDURE — 84439 ASSAY OF FREE THYROXINE: CPT

## 2019-10-15 PROCEDURE — APPSS60 APP SPLIT SHARED TIME 46-60 MINUTES: Performed by: NURSE PRACTITIONER

## 2019-10-15 PROCEDURE — 83036 HEMOGLOBIN GLYCOSYLATED A1C: CPT

## 2019-10-15 PROCEDURE — 6360000002 HC RX W HCPCS: Performed by: INTERNAL MEDICINE

## 2019-10-15 PROCEDURE — 87486 CHLMYD PNEUM DNA AMP PROBE: CPT

## 2019-10-15 PROCEDURE — 6360000004 HC RX CONTRAST MEDICATION: Performed by: NURSE PRACTITIONER

## 2019-10-15 PROCEDURE — 87581 M.PNEUMON DNA AMP PROBE: CPT

## 2019-10-15 PROCEDURE — 80048 BASIC METABOLIC PNL TOTAL CA: CPT

## 2019-10-15 PROCEDURE — 80076 HEPATIC FUNCTION PANEL: CPT

## 2019-10-15 PROCEDURE — 99223 1ST HOSP IP/OBS HIGH 75: CPT | Performed by: INTERNAL MEDICINE

## 2019-10-15 PROCEDURE — 2580000003 HC RX 258

## 2019-10-15 PROCEDURE — 84443 ASSAY THYROID STIM HORMONE: CPT

## 2019-10-15 PROCEDURE — 87798 DETECT AGENT NOS DNA AMP: CPT

## 2019-10-15 PROCEDURE — 2700000000 HC OXYGEN THERAPY PER DAY

## 2019-10-15 PROCEDURE — 2060000000 HC ICU INTERMEDIATE R&B

## 2019-10-15 RX ORDER — BUMETANIDE 0.25 MG/ML
1 INJECTION, SOLUTION INTRAMUSCULAR; INTRAVENOUS ONCE
Status: COMPLETED | OUTPATIENT
Start: 2019-10-15 | End: 2019-10-15

## 2019-10-15 RX ORDER — CHLORTHALIDONE 25 MG/1
25 TABLET ORAL DAILY
Status: DISCONTINUED | OUTPATIENT
Start: 2019-10-15 | End: 2019-10-18 | Stop reason: HOSPADM

## 2019-10-15 RX ORDER — LEVALBUTEROL INHALATION SOLUTION 0.63 MG/3ML
0.63 SOLUTION RESPIRATORY (INHALATION) EVERY 6 HOURS
Status: DISCONTINUED | OUTPATIENT
Start: 2019-10-15 | End: 2019-10-18 | Stop reason: HOSPADM

## 2019-10-15 RX ORDER — METHYLPREDNISOLONE SODIUM SUCCINATE 40 MG/ML
40 INJECTION, POWDER, LYOPHILIZED, FOR SOLUTION INTRAMUSCULAR; INTRAVENOUS EVERY 12 HOURS
Status: DISCONTINUED | OUTPATIENT
Start: 2019-10-15 | End: 2019-10-17

## 2019-10-15 RX ORDER — IPRATROPIUM BROMIDE AND ALBUTEROL SULFATE 2.5; .5 MG/3ML; MG/3ML
1 SOLUTION RESPIRATORY (INHALATION) EVERY 4 HOURS
Status: DISCONTINUED | OUTPATIENT
Start: 2019-10-15 | End: 2019-10-15

## 2019-10-15 RX ADMIN — GLYCOPYRROLATE AND FORMOTEROL FUMARATE 2 PUFF: 9; 4.8 AEROSOL, METERED RESPIRATORY (INHALATION) at 20:22

## 2019-10-15 RX ADMIN — METHYLPREDNISOLONE SODIUM SUCCINATE 40 MG: 40 INJECTION, POWDER, LYOPHILIZED, FOR SOLUTION INTRAMUSCULAR; INTRAVENOUS at 10:08

## 2019-10-15 RX ADMIN — BUMETANIDE 1 MG: 0.25 INJECTION INTRAMUSCULAR; INTRAVENOUS at 18:06

## 2019-10-15 RX ADMIN — PERFLUTREN 1.65 MG: 6.52 INJECTION, SUSPENSION INTRAVENOUS at 09:33

## 2019-10-15 RX ADMIN — METOPROLOL TARTRATE 25 MG: 25 TABLET ORAL at 08:42

## 2019-10-15 RX ADMIN — IPRATROPIUM BROMIDE AND ALBUTEROL SULFATE 1 AMPULE: .5; 3 SOLUTION RESPIRATORY (INHALATION) at 11:10

## 2019-10-15 RX ADMIN — METHYLPREDNISOLONE SODIUM SUCCINATE 40 MG: 40 INJECTION, POWDER, LYOPHILIZED, FOR SOLUTION INTRAMUSCULAR; INTRAVENOUS at 21:19

## 2019-10-15 RX ADMIN — WATER 10 ML: 1 INJECTION INTRAMUSCULAR; INTRAVENOUS; SUBCUTANEOUS at 21:20

## 2019-10-15 RX ADMIN — LEVALBUTEROL HYDROCHLORIDE 0.63 MG: 0.63 SOLUTION RESPIRATORY (INHALATION) at 19:46

## 2019-10-15 RX ADMIN — ENOXAPARIN SODIUM 150 MG: 150 INJECTION SUBCUTANEOUS at 08:41

## 2019-10-15 RX ADMIN — LOSARTAN POTASSIUM 100 MG: 50 TABLET, FILM COATED ORAL at 08:42

## 2019-10-15 RX ADMIN — CHLORTHALIDONE 25 MG: 25 TABLET ORAL at 18:06

## 2019-10-15 RX ADMIN — AMLODIPINE BESYLATE 5 MG: 5 TABLET ORAL at 08:42

## 2019-10-15 RX ADMIN — APIXABAN 5 MG: 5 TABLET, FILM COATED ORAL at 21:19

## 2019-10-15 RX ADMIN — Medication 10 ML: at 21:19

## 2019-10-15 RX ADMIN — Medication 1 TABLET: at 08:42

## 2019-10-15 RX ADMIN — METOPROLOL TARTRATE 25 MG: 25 TABLET ORAL at 21:19

## 2019-10-15 ASSESSMENT — PAIN SCALES - GENERAL
PAINLEVEL_OUTOF10: 0

## 2019-10-16 LAB
EKG ATRIAL RATE: 141 BPM
EKG Q-T INTERVAL: 302 MS
EKG QRS DURATION: 86 MS
EKG QTC CALCULATION (BAZETT): 406 MS
EKG R AXIS: 28 DEGREES
EKG T AXIS: 56 DEGREES
EKG VENTRICULAR RATE: 109 BPM

## 2019-10-16 PROCEDURE — 6360000002 HC RX W HCPCS: Performed by: EMERGENCY MEDICINE

## 2019-10-16 PROCEDURE — 99232 SBSQ HOSP IP/OBS MODERATE 35: CPT | Performed by: INTERNAL MEDICINE

## 2019-10-16 PROCEDURE — 6370000000 HC RX 637 (ALT 250 FOR IP): Performed by: INTERNAL MEDICINE

## 2019-10-16 PROCEDURE — 93010 ELECTROCARDIOGRAM REPORT: CPT | Performed by: INTERNAL MEDICINE

## 2019-10-16 PROCEDURE — 6370000000 HC RX 637 (ALT 250 FOR IP): Performed by: NURSE PRACTITIONER

## 2019-10-16 PROCEDURE — 2580000003 HC RX 258: Performed by: EMERGENCY MEDICINE

## 2019-10-16 PROCEDURE — 2700000000 HC OXYGEN THERAPY PER DAY

## 2019-10-16 PROCEDURE — 94640 AIRWAY INHALATION TREATMENT: CPT

## 2019-10-16 PROCEDURE — 6360000002 HC RX W HCPCS: Performed by: INTERNAL MEDICINE

## 2019-10-16 PROCEDURE — 2060000000 HC ICU INTERMEDIATE R&B

## 2019-10-16 RX ADMIN — LOSARTAN POTASSIUM 100 MG: 50 TABLET, FILM COATED ORAL at 09:59

## 2019-10-16 RX ADMIN — METHYLPREDNISOLONE SODIUM SUCCINATE 40 MG: 40 INJECTION, POWDER, LYOPHILIZED, FOR SOLUTION INTRAMUSCULAR; INTRAVENOUS at 21:26

## 2019-10-16 RX ADMIN — APIXABAN 5 MG: 5 TABLET, FILM COATED ORAL at 10:00

## 2019-10-16 RX ADMIN — METHYLPREDNISOLONE SODIUM SUCCINATE 40 MG: 40 INJECTION, POWDER, LYOPHILIZED, FOR SOLUTION INTRAMUSCULAR; INTRAVENOUS at 10:00

## 2019-10-16 RX ADMIN — Medication 1 TABLET: at 09:59

## 2019-10-16 RX ADMIN — LEVALBUTEROL HYDROCHLORIDE 0.63 MG: 0.63 SOLUTION RESPIRATORY (INHALATION) at 08:53

## 2019-10-16 RX ADMIN — GLYCOPYRROLATE AND FORMOTEROL FUMARATE 2 PUFF: 9; 4.8 AEROSOL, METERED RESPIRATORY (INHALATION) at 08:43

## 2019-10-16 RX ADMIN — LEVALBUTEROL HYDROCHLORIDE 0.63 MG: 0.63 SOLUTION RESPIRATORY (INHALATION) at 13:55

## 2019-10-16 RX ADMIN — LEVALBUTEROL HYDROCHLORIDE 0.63 MG: 0.63 SOLUTION RESPIRATORY (INHALATION) at 00:52

## 2019-10-16 RX ADMIN — LEVALBUTEROL HYDROCHLORIDE 0.63 MG: 0.63 SOLUTION RESPIRATORY (INHALATION) at 20:24

## 2019-10-16 RX ADMIN — CHLORTHALIDONE 25 MG: 25 TABLET ORAL at 09:59

## 2019-10-16 RX ADMIN — Medication 10 ML: at 21:26

## 2019-10-16 RX ADMIN — METOPROLOL TARTRATE 25 MG: 25 TABLET ORAL at 09:59

## 2019-10-16 RX ADMIN — GLYCOPYRROLATE AND FORMOTEROL FUMARATE 2 PUFF: 9; 4.8 AEROSOL, METERED RESPIRATORY (INHALATION) at 20:24

## 2019-10-16 RX ADMIN — APIXABAN 5 MG: 5 TABLET, FILM COATED ORAL at 21:26

## 2019-10-16 RX ADMIN — METOPROLOL TARTRATE 25 MG: 25 TABLET ORAL at 21:26

## 2019-10-16 RX ADMIN — Medication 10 ML: at 10:00

## 2019-10-16 ASSESSMENT — ENCOUNTER SYMPTOMS
DIARRHEA: 0
EYE DISCHARGE: 0
VOMITING: 0
ABDOMINAL PAIN: 0
NAUSEA: 0
SORE THROAT: 0
SINUS PRESSURE: 0
EYE PAIN: 0
WHEEZING: 1
BACK PAIN: 0
EYE REDNESS: 0
COUGH: 1
SHORTNESS OF BREATH: 1

## 2019-10-16 ASSESSMENT — PAIN SCALES - GENERAL
PAINLEVEL_OUTOF10: 0

## 2019-10-17 PROCEDURE — 2700000000 HC OXYGEN THERAPY PER DAY

## 2019-10-17 PROCEDURE — 2580000003 HC RX 258: Performed by: EMERGENCY MEDICINE

## 2019-10-17 PROCEDURE — 94640 AIRWAY INHALATION TREATMENT: CPT

## 2019-10-17 PROCEDURE — 2060000000 HC ICU INTERMEDIATE R&B

## 2019-10-17 PROCEDURE — 6360000002 HC RX W HCPCS: Performed by: INTERNAL MEDICINE

## 2019-10-17 PROCEDURE — 6370000000 HC RX 637 (ALT 250 FOR IP): Performed by: INTERNAL MEDICINE

## 2019-10-17 PROCEDURE — 6360000002 HC RX W HCPCS: Performed by: EMERGENCY MEDICINE

## 2019-10-17 PROCEDURE — 99231 SBSQ HOSP IP/OBS SF/LOW 25: CPT | Performed by: INTERNAL MEDICINE

## 2019-10-17 PROCEDURE — 6370000000 HC RX 637 (ALT 250 FOR IP): Performed by: NURSE PRACTITIONER

## 2019-10-17 RX ORDER — PREDNISONE 20 MG/1
60 TABLET ORAL DAILY
Status: DISCONTINUED | OUTPATIENT
Start: 2019-10-18 | End: 2019-10-18 | Stop reason: SDUPTHER

## 2019-10-17 RX ADMIN — GLYCOPYRROLATE AND FORMOTEROL FUMARATE 2 PUFF: 9; 4.8 AEROSOL, METERED RESPIRATORY (INHALATION) at 08:39

## 2019-10-17 RX ADMIN — LEVALBUTEROL HYDROCHLORIDE 0.63 MG: 0.63 SOLUTION RESPIRATORY (INHALATION) at 03:45

## 2019-10-17 RX ADMIN — Medication 10 ML: at 20:27

## 2019-10-17 RX ADMIN — LOSARTAN POTASSIUM 100 MG: 50 TABLET, FILM COATED ORAL at 08:18

## 2019-10-17 RX ADMIN — LEVALBUTEROL HYDROCHLORIDE 0.63 MG: 0.63 SOLUTION RESPIRATORY (INHALATION) at 20:38

## 2019-10-17 RX ADMIN — APIXABAN 5 MG: 5 TABLET, FILM COATED ORAL at 08:18

## 2019-10-17 RX ADMIN — GLYCOPYRROLATE AND FORMOTEROL FUMARATE 2 PUFF: 9; 4.8 AEROSOL, METERED RESPIRATORY (INHALATION) at 20:39

## 2019-10-17 RX ADMIN — Medication 1 TABLET: at 08:18

## 2019-10-17 RX ADMIN — LEVALBUTEROL HYDROCHLORIDE 0.63 MG: 0.63 SOLUTION RESPIRATORY (INHALATION) at 08:39

## 2019-10-17 RX ADMIN — METOPROLOL TARTRATE 25 MG: 25 TABLET ORAL at 08:18

## 2019-10-17 RX ADMIN — CHLORTHALIDONE 25 MG: 25 TABLET ORAL at 08:18

## 2019-10-17 RX ADMIN — METHYLPREDNISOLONE SODIUM SUCCINATE 40 MG: 40 INJECTION, POWDER, LYOPHILIZED, FOR SOLUTION INTRAMUSCULAR; INTRAVENOUS at 08:19

## 2019-10-17 RX ADMIN — APIXABAN 5 MG: 5 TABLET, FILM COATED ORAL at 20:27

## 2019-10-17 RX ADMIN — LEVALBUTEROL HYDROCHLORIDE 0.63 MG: 0.63 SOLUTION RESPIRATORY (INHALATION) at 15:25

## 2019-10-17 RX ADMIN — Medication 10 ML: at 08:19

## 2019-10-17 ASSESSMENT — PAIN SCALES - GENERAL
PAINLEVEL_OUTOF10: 0

## 2019-10-18 VITALS
SYSTOLIC BLOOD PRESSURE: 146 MMHG | RESPIRATION RATE: 18 BRPM | HEART RATE: 89 BPM | HEIGHT: 74 IN | DIASTOLIC BLOOD PRESSURE: 81 MMHG | BODY MASS INDEX: 40.43 KG/M2 | WEIGHT: 315 LBS | OXYGEN SATURATION: 94 % | TEMPERATURE: 98.3 F

## 2019-10-18 PROCEDURE — 2580000003 HC RX 258: Performed by: EMERGENCY MEDICINE

## 2019-10-18 PROCEDURE — G0008 ADMIN INFLUENZA VIRUS VAC: HCPCS | Performed by: FAMILY MEDICINE

## 2019-10-18 PROCEDURE — 2700000000 HC OXYGEN THERAPY PER DAY

## 2019-10-18 PROCEDURE — 6360000002 HC RX W HCPCS: Performed by: FAMILY MEDICINE

## 2019-10-18 PROCEDURE — 6370000000 HC RX 637 (ALT 250 FOR IP): Performed by: FAMILY MEDICINE

## 2019-10-18 PROCEDURE — 6370000000 HC RX 637 (ALT 250 FOR IP): Performed by: INTERNAL MEDICINE

## 2019-10-18 PROCEDURE — 6360000002 HC RX W HCPCS: Performed by: INTERNAL MEDICINE

## 2019-10-18 PROCEDURE — 6370000000 HC RX 637 (ALT 250 FOR IP): Performed by: NURSE PRACTITIONER

## 2019-10-18 PROCEDURE — 94640 AIRWAY INHALATION TREATMENT: CPT

## 2019-10-18 PROCEDURE — 90653 IIV ADJUVANT VACCINE IM: CPT | Performed by: FAMILY MEDICINE

## 2019-10-18 RX ORDER — PREDNISONE 10 MG/1
10 TABLET ORAL DAILY
Qty: 2 TABLET | Refills: 0 | Status: SHIPPED | OUTPATIENT
Start: 2019-10-28 | End: 2019-10-30

## 2019-10-18 RX ORDER — PREDNISONE 20 MG/1
60 TABLET ORAL DAILY
Status: DISCONTINUED | OUTPATIENT
Start: 2019-10-18 | End: 2019-10-18 | Stop reason: HOSPADM

## 2019-10-18 RX ORDER — PREDNISONE 20 MG/1
60 TABLET ORAL DAILY
Qty: 6 TABLET | Refills: 0 | Status: ON HOLD | OUTPATIENT
Start: 2019-10-18 | End: 2019-10-21 | Stop reason: HOSPADM

## 2019-10-18 RX ORDER — CHLORTHALIDONE 25 MG/1
25 TABLET ORAL DAILY
Qty: 30 TABLET | Refills: 3 | Status: SHIPPED | OUTPATIENT
Start: 2019-10-18 | End: 2020-02-07 | Stop reason: ALTCHOICE

## 2019-10-18 RX ORDER — PREDNISONE 20 MG/1
40 TABLET ORAL DAILY
Qty: 4 TABLET | Refills: 0 | Status: SHIPPED | OUTPATIENT
Start: 2019-10-22 | End: 2019-10-24

## 2019-10-18 RX ORDER — PREDNISONE 20 MG/1
40 TABLET ORAL DAILY
Status: DISCONTINUED | OUTPATIENT
Start: 2019-10-22 | End: 2019-10-18 | Stop reason: HOSPADM

## 2019-10-18 RX ORDER — PREDNISONE 20 MG/1
20 TABLET ORAL DAILY
Qty: 2 TABLET | Refills: 0 | Status: SHIPPED | OUTPATIENT
Start: 2019-10-26 | End: 2019-10-28

## 2019-10-18 RX ORDER — PREDNISONE 1 MG/1
10 TABLET ORAL DAILY
Status: DISCONTINUED | OUTPATIENT
Start: 2019-10-28 | End: 2019-10-18 | Stop reason: HOSPADM

## 2019-10-18 RX ORDER — PREDNISONE 20 MG/1
20 TABLET ORAL DAILY
Status: DISCONTINUED | OUTPATIENT
Start: 2019-10-26 | End: 2019-10-18 | Stop reason: HOSPADM

## 2019-10-18 RX ORDER — PREDNISONE 10 MG/1
30 TABLET ORAL DAILY
Qty: 6 TABLET | Refills: 0 | Status: SHIPPED | OUTPATIENT
Start: 2019-10-24 | End: 2019-10-26

## 2019-10-18 RX ORDER — PREDNISONE 50 MG/1
50 TABLET ORAL DAILY
Qty: 2 TABLET | Refills: 0 | Status: SHIPPED | OUTPATIENT
Start: 2019-10-20 | End: 2019-10-22

## 2019-10-18 RX ADMIN — LEVALBUTEROL HYDROCHLORIDE 0.63 MG: 0.63 SOLUTION RESPIRATORY (INHALATION) at 09:47

## 2019-10-18 RX ADMIN — CHLORTHALIDONE 25 MG: 25 TABLET ORAL at 08:54

## 2019-10-18 RX ADMIN — LOSARTAN POTASSIUM 100 MG: 50 TABLET, FILM COATED ORAL at 08:54

## 2019-10-18 RX ADMIN — PREDNISONE 60 MG: 20 TABLET ORAL at 08:54

## 2019-10-18 RX ADMIN — APIXABAN 5 MG: 5 TABLET, FILM COATED ORAL at 08:53

## 2019-10-18 RX ADMIN — METOPROLOL TARTRATE 25 MG: 25 TABLET ORAL at 08:54

## 2019-10-18 RX ADMIN — INFLUENZA VACCINE, ADJUVANTED 0.5 ML: 15; 15; 15 INJECTION, SUSPENSION INTRAMUSCULAR at 10:46

## 2019-10-18 RX ADMIN — Medication 1 TABLET: at 08:54

## 2019-10-18 RX ADMIN — GLYCOPYRROLATE AND FORMOTEROL FUMARATE 2 PUFF: 9; 4.8 AEROSOL, METERED RESPIRATORY (INHALATION) at 09:47

## 2019-10-18 RX ADMIN — Medication 10 ML: at 08:54

## 2019-10-18 RX ADMIN — LEVALBUTEROL HYDROCHLORIDE 0.63 MG: 0.63 SOLUTION RESPIRATORY (INHALATION) at 01:16

## 2019-10-18 ASSESSMENT — PAIN SCALES - GENERAL
PAINLEVEL_OUTOF10: 0
PAINLEVEL_OUTOF10: 0

## 2019-10-19 ENCOUNTER — HOSPITAL ENCOUNTER (INPATIENT)
Age: 64
LOS: 2 days | Discharge: HOME | DRG: 191 | End: 2019-10-21
Attending: EMERGENCY MEDICINE | Admitting: NEUROMUSCULOSKELETAL MEDICINE & OMM
Payer: MEDICARE

## 2019-10-19 ENCOUNTER — APPOINTMENT (OUTPATIENT)
Dept: GENERAL RADIOLOGY | Age: 64
DRG: 191 | End: 2019-10-19
Payer: MEDICARE

## 2019-10-19 ENCOUNTER — APPOINTMENT (OUTPATIENT)
Dept: CT IMAGING | Age: 64
DRG: 191 | End: 2019-10-19
Payer: MEDICARE

## 2019-10-19 DIAGNOSIS — J18.9 PNEUMONIA DUE TO ORGANISM: Primary | ICD-10-CM

## 2019-10-19 DIAGNOSIS — E86.0 DEHYDRATION: ICD-10-CM

## 2019-10-19 DIAGNOSIS — N17.9 ACUTE RENAL FAILURE, UNSPECIFIED ACUTE RENAL FAILURE TYPE (HCC): ICD-10-CM

## 2019-10-19 LAB
ALBUMIN SERPL-MCNC: 3.8 G/DL (ref 3.5–5.2)
ALP BLD-CCNC: 77 U/L (ref 40–129)
ALT SERPL-CCNC: 77 U/L (ref 0–40)
ANION GAP SERPL CALCULATED.3IONS-SCNC: 14 MMOL/L (ref 7–16)
AST SERPL-CCNC: 34 U/L (ref 0–39)
BACTERIA: NORMAL /HPF
BASOPHILS ABSOLUTE: 0.03 E9/L (ref 0–0.2)
BASOPHILS RELATIVE PERCENT: 0.2 % (ref 0–2)
BILIRUB SERPL-MCNC: 0.4 MG/DL (ref 0–1.2)
BILIRUBIN URINE: NEGATIVE
BLOOD, URINE: NEGATIVE
BUN BLDV-MCNC: 48 MG/DL (ref 8–23)
CALCIUM SERPL-MCNC: 8.7 MG/DL (ref 8.6–10.2)
CASTS: NORMAL /LPF
CHLORIDE BLD-SCNC: 95 MMOL/L (ref 98–107)
CK MB: 2.1 NG/ML (ref 0–7.7)
CLARITY: CLEAR
CO2: 24 MMOL/L (ref 22–29)
COLOR: YELLOW
CREAT SERPL-MCNC: 2 MG/DL (ref 0.7–1.2)
EKG ATRIAL RATE: 375 BPM
EKG ATRIAL RATE: 54 BPM
EKG Q-T INTERVAL: 404 MS
EKG Q-T INTERVAL: 436 MS
EKG QRS DURATION: 92 MS
EKG QRS DURATION: 96 MS
EKG QTC CALCULATION (BAZETT): 460 MS
EKG QTC CALCULATION (BAZETT): 470 MS
EKG R AXIS: 32 DEGREES
EKG R AXIS: 6 DEGREES
EKG T AXIS: 35 DEGREES
EKG T AXIS: 49 DEGREES
EKG VENTRICULAR RATE: 70 BPM
EKG VENTRICULAR RATE: 78 BPM
EOSINOPHILS ABSOLUTE: 0.28 E9/L (ref 0.05–0.5)
EOSINOPHILS RELATIVE PERCENT: 1.7 % (ref 0–6)
GFR AFRICAN AMERICAN: 41
GFR NON-AFRICAN AMERICAN: 34 ML/MIN/1.73
GLUCOSE BLD-MCNC: 134 MG/DL (ref 74–99)
GLUCOSE URINE: NEGATIVE MG/DL
HCT VFR BLD CALC: 49.2 % (ref 37–54)
HEMOGLOBIN: 17 G/DL (ref 12.5–16.5)
IMMATURE GRANULOCYTES #: 0.1 E9/L
IMMATURE GRANULOCYTES %: 0.6 % (ref 0–5)
KETONES, URINE: NEGATIVE MG/DL
LACTIC ACID: 2 MMOL/L (ref 0.5–2.2)
LEUKOCYTE ESTERASE, URINE: NEGATIVE
LIPASE: 30 U/L (ref 13–60)
LYMPHOCYTES ABSOLUTE: 4.73 E9/L (ref 1.5–4)
LYMPHOCYTES RELATIVE PERCENT: 29.3 % (ref 20–42)
MCH RBC QN AUTO: 33.3 PG (ref 26–35)
MCHC RBC AUTO-ENTMCNC: 34.6 % (ref 32–34.5)
MCV RBC AUTO: 96.3 FL (ref 80–99.9)
MONOCYTES ABSOLUTE: 1.8 E9/L (ref 0.1–0.95)
MONOCYTES RELATIVE PERCENT: 11.1 % (ref 2–12)
MUCUS: PRESENT
NEUTROPHILS ABSOLUTE: 9.21 E9/L (ref 1.8–7.3)
NEUTROPHILS RELATIVE PERCENT: 57.1 % (ref 43–80)
NITRITE, URINE: NEGATIVE
PDW BLD-RTO: 13.2 FL (ref 11.5–15)
PH UA: 5.5 (ref 5–9)
PLATELET # BLD: 279 E9/L (ref 130–450)
PMV BLD AUTO: 10.1 FL (ref 7–12)
POTASSIUM SERPL-SCNC: 3.4 MMOL/L (ref 3.5–5)
PRO-BNP: 187 PG/ML (ref 0–125)
PROTEIN UA: NEGATIVE MG/DL
RBC # BLD: 5.11 E12/L (ref 3.8–5.8)
RBC # BLD: NORMAL 10*6/UL
RBC UA: NORMAL /HPF (ref 0–2)
SODIUM BLD-SCNC: 133 MMOL/L (ref 132–146)
SPECIFIC GRAVITY UA: 1.01 (ref 1–1.03)
TOTAL CK: 66 U/L (ref 20–200)
TOTAL PROTEIN: 6.6 G/DL (ref 6.4–8.3)
TROPONIN: <0.01 NG/ML (ref 0–0.03)
TROPONIN: <0.01 NG/ML (ref 0–0.03)
UROBILINOGEN, URINE: 0.2 E.U./DL
VACUOLATED NEUTROPHILS: ABNORMAL
WBC # BLD: 16.2 E9/L (ref 4.5–11.5)
WBC UA: NORMAL /HPF (ref 0–5)

## 2019-10-19 PROCEDURE — 2580000003 HC RX 258: Performed by: STUDENT IN AN ORGANIZED HEALTH CARE EDUCATION/TRAINING PROGRAM

## 2019-10-19 PROCEDURE — 2580000003 HC RX 258: Performed by: FAMILY MEDICINE

## 2019-10-19 PROCEDURE — 83880 ASSAY OF NATRIURETIC PEPTIDE: CPT

## 2019-10-19 PROCEDURE — 6360000002 HC RX W HCPCS: Performed by: NEUROMUSCULOSKELETAL MEDICINE & OMM

## 2019-10-19 PROCEDURE — 80053 COMPREHEN METABOLIC PANEL: CPT

## 2019-10-19 PROCEDURE — 36415 COLL VENOUS BLD VENIPUNCTURE: CPT

## 2019-10-19 PROCEDURE — 84484 ASSAY OF TROPONIN QUANT: CPT

## 2019-10-19 PROCEDURE — 83605 ASSAY OF LACTIC ACID: CPT

## 2019-10-19 PROCEDURE — 82550 ASSAY OF CK (CPK): CPT

## 2019-10-19 PROCEDURE — 82553 CREATINE MB FRACTION: CPT

## 2019-10-19 PROCEDURE — 99285 EMERGENCY DEPT VISIT HI MDM: CPT

## 2019-10-19 PROCEDURE — 2060000000 HC ICU INTERMEDIATE R&B

## 2019-10-19 PROCEDURE — 2580000003 HC RX 258: Performed by: EMERGENCY MEDICINE

## 2019-10-19 PROCEDURE — 93010 ELECTROCARDIOGRAM REPORT: CPT | Performed by: INTERNAL MEDICINE

## 2019-10-19 PROCEDURE — 6370000000 HC RX 637 (ALT 250 FOR IP): Performed by: NEUROMUSCULOSKELETAL MEDICINE & OMM

## 2019-10-19 PROCEDURE — 6360000002 HC RX W HCPCS: Performed by: EMERGENCY MEDICINE

## 2019-10-19 PROCEDURE — 83690 ASSAY OF LIPASE: CPT

## 2019-10-19 PROCEDURE — 6370000000 HC RX 637 (ALT 250 FOR IP): Performed by: STUDENT IN AN ORGANIZED HEALTH CARE EDUCATION/TRAINING PROGRAM

## 2019-10-19 PROCEDURE — 93005 ELECTROCARDIOGRAM TRACING: CPT | Performed by: EMERGENCY MEDICINE

## 2019-10-19 PROCEDURE — 71250 CT THORAX DX C-: CPT

## 2019-10-19 PROCEDURE — 81001 URINALYSIS AUTO W/SCOPE: CPT

## 2019-10-19 PROCEDURE — 2700000000 HC OXYGEN THERAPY PER DAY

## 2019-10-19 PROCEDURE — 74176 CT ABD & PELVIS W/O CONTRAST: CPT

## 2019-10-19 PROCEDURE — 94761 N-INVAS EAR/PLS OXIMETRY MLT: CPT

## 2019-10-19 PROCEDURE — 71045 X-RAY EXAM CHEST 1 VIEW: CPT

## 2019-10-19 PROCEDURE — 93005 ELECTROCARDIOGRAM TRACING: CPT | Performed by: STUDENT IN AN ORGANIZED HEALTH CARE EDUCATION/TRAINING PROGRAM

## 2019-10-19 PROCEDURE — 96365 THER/PROPH/DIAG IV INF INIT: CPT

## 2019-10-19 PROCEDURE — 85025 COMPLETE CBC W/AUTO DIFF WBC: CPT

## 2019-10-19 RX ORDER — ONDANSETRON 2 MG/ML
4 INJECTION INTRAMUSCULAR; INTRAVENOUS EVERY 8 HOURS PRN
Status: DISCONTINUED | OUTPATIENT
Start: 2019-10-19 | End: 2019-10-21 | Stop reason: HOSPADM

## 2019-10-19 RX ORDER — 0.9 % SODIUM CHLORIDE 0.9 %
1000 INTRAVENOUS SOLUTION INTRAVENOUS ONCE
Status: COMPLETED | OUTPATIENT
Start: 2019-10-19 | End: 2019-10-19

## 2019-10-19 RX ORDER — SODIUM CHLORIDE 0.9 % (FLUSH) 0.9 %
10 SYRINGE (ML) INJECTION EVERY 12 HOURS SCHEDULED
Status: DISCONTINUED | OUTPATIENT
Start: 2019-10-19 | End: 2019-10-21 | Stop reason: HOSPADM

## 2019-10-19 RX ORDER — ALBUTEROL SULFATE 90 UG/1
2 AEROSOL, METERED RESPIRATORY (INHALATION) EVERY 4 HOURS PRN
Status: DISCONTINUED | OUTPATIENT
Start: 2019-10-19 | End: 2019-10-21 | Stop reason: HOSPADM

## 2019-10-19 RX ORDER — PREDNISONE 1 MG/1
10 TABLET ORAL DAILY
Status: DISCONTINUED | OUTPATIENT
Start: 2019-10-28 | End: 2019-10-21 | Stop reason: HOSPADM

## 2019-10-19 RX ORDER — M-VIT,TX,IRON,MINS/CALC/FOLIC 27MG-0.4MG
1 TABLET ORAL DAILY
Status: DISCONTINUED | OUTPATIENT
Start: 2019-10-19 | End: 2019-10-21 | Stop reason: HOSPADM

## 2019-10-19 RX ORDER — ACETAMINOPHEN 325 MG/1
650 TABLET ORAL EVERY 4 HOURS PRN
Status: DISCONTINUED | OUTPATIENT
Start: 2019-10-19 | End: 2019-10-21 | Stop reason: HOSPADM

## 2019-10-19 RX ORDER — PREDNISONE 20 MG/1
20 TABLET ORAL DAILY
Status: DISCONTINUED | OUTPATIENT
Start: 2019-10-26 | End: 2019-10-21 | Stop reason: HOSPADM

## 2019-10-19 RX ORDER — CHLORTHALIDONE 25 MG/1
25 TABLET ORAL DAILY
Status: DISCONTINUED | OUTPATIENT
Start: 2019-10-19 | End: 2019-10-21 | Stop reason: HOSPADM

## 2019-10-19 RX ORDER — PREDNISONE 20 MG/1
60 TABLET ORAL DAILY
Status: COMPLETED | OUTPATIENT
Start: 2019-10-19 | End: 2019-10-19

## 2019-10-19 RX ORDER — PREDNISONE 20 MG/1
40 TABLET ORAL DAILY
Status: DISCONTINUED | OUTPATIENT
Start: 2019-10-22 | End: 2019-10-21 | Stop reason: HOSPADM

## 2019-10-19 RX ORDER — SODIUM CHLORIDE 9 MG/ML
INJECTION, SOLUTION INTRAVENOUS CONTINUOUS
Status: DISCONTINUED | OUTPATIENT
Start: 2019-10-19 | End: 2019-10-21

## 2019-10-19 RX ORDER — 0.9 % SODIUM CHLORIDE 0.9 %
500 INTRAVENOUS SOLUTION INTRAVENOUS ONCE
Status: DISCONTINUED | OUTPATIENT
Start: 2019-10-19 | End: 2019-10-19

## 2019-10-19 RX ORDER — POTASSIUM CHLORIDE 20 MEQ/1
40 TABLET, EXTENDED RELEASE ORAL ONCE
Status: COMPLETED | OUTPATIENT
Start: 2019-10-19 | End: 2019-10-19

## 2019-10-19 RX ORDER — SODIUM CHLORIDE 0.9 % (FLUSH) 0.9 %
10 SYRINGE (ML) INJECTION PRN
Status: DISCONTINUED | OUTPATIENT
Start: 2019-10-19 | End: 2019-10-21 | Stop reason: HOSPADM

## 2019-10-19 RX ORDER — SODIUM CHLORIDE AND POTASSIUM CHLORIDE .9; .15 G/100ML; G/100ML
SOLUTION INTRAVENOUS CONTINUOUS
Status: DISCONTINUED | OUTPATIENT
Start: 2019-10-19 | End: 2019-10-21 | Stop reason: HOSPADM

## 2019-10-19 RX ORDER — LOSARTAN POTASSIUM 50 MG/1
100 TABLET ORAL NIGHTLY
Status: DISCONTINUED | OUTPATIENT
Start: 2019-10-19 | End: 2019-10-21 | Stop reason: HOSPADM

## 2019-10-19 RX ADMIN — POTASSIUM CHLORIDE AND SODIUM CHLORIDE: 900; 150 INJECTION, SOLUTION INTRAVENOUS at 17:34

## 2019-10-19 RX ADMIN — SODIUM CHLORIDE: 9 INJECTION, SOLUTION INTRAVENOUS at 13:11

## 2019-10-19 RX ADMIN — Medication 1 TABLET: at 17:34

## 2019-10-19 RX ADMIN — LOSARTAN POTASSIUM 100 MG: 50 TABLET ORAL at 20:20

## 2019-10-19 RX ADMIN — SODIUM CHLORIDE 1000 ML: 9 INJECTION, SOLUTION INTRAVENOUS at 09:18

## 2019-10-19 RX ADMIN — SODIUM CHLORIDE 1000 ML: 9 INJECTION, SOLUTION INTRAVENOUS at 13:29

## 2019-10-19 RX ADMIN — PREDNISONE 60 MG: 20 TABLET ORAL at 17:34

## 2019-10-19 RX ADMIN — Medication 10 ML: at 20:21

## 2019-10-19 RX ADMIN — PIPERACILLIN AND TAZOBACTAM 4.5 G: 4; .5 INJECTION, POWDER, LYOPHILIZED, FOR SOLUTION INTRAVENOUS at 12:24

## 2019-10-19 RX ADMIN — METOPROLOL TARTRATE 25 MG: 25 TABLET ORAL at 20:20

## 2019-10-19 RX ADMIN — SODIUM CHLORIDE 1000 ML: 9 INJECTION, SOLUTION INTRAVENOUS at 10:41

## 2019-10-19 RX ADMIN — VANCOMYCIN HYDROCHLORIDE 2500 MG: 5 INJECTION, POWDER, LYOPHILIZED, FOR SOLUTION INTRAVENOUS at 13:12

## 2019-10-19 RX ADMIN — SODIUM CHLORIDE 1000 ML: 0.9 INJECTION, SOLUTION INTRAVENOUS at 08:53

## 2019-10-19 RX ADMIN — POTASSIUM CHLORIDE 40 MEQ: 20 TABLET, EXTENDED RELEASE ORAL at 10:40

## 2019-10-19 RX ADMIN — CHLORTHALIDONE 25 MG: 25 TABLET ORAL at 18:52

## 2019-10-19 RX ADMIN — APIXABAN 5 MG: 5 TABLET, FILM COATED ORAL at 20:20

## 2019-10-19 ASSESSMENT — ENCOUNTER SYMPTOMS
SORE THROAT: 0
WHEEZING: 0
BACK PAIN: 0
SHORTNESS OF BREATH: 1
NAUSEA: 0
ABDOMINAL PAIN: 0
DIARRHEA: 0
CONSTIPATION: 0
COUGH: 0
VOMITING: 0
COLOR CHANGE: 0

## 2019-10-19 ASSESSMENT — PAIN DESCRIPTION - LOCATION: LOCATION: CHEST

## 2019-10-19 ASSESSMENT — PAIN DESCRIPTION - PAIN TYPE: TYPE: ACUTE PAIN

## 2019-10-19 ASSESSMENT — PAIN SCALES - GENERAL
PAINLEVEL_OUTOF10: 0
PAINLEVEL_OUTOF10: 1

## 2019-10-19 ASSESSMENT — PAIN DESCRIPTION - ORIENTATION: ORIENTATION: LEFT

## 2019-10-20 ENCOUNTER — APPOINTMENT (OUTPATIENT)
Dept: GENERAL RADIOLOGY | Age: 64
DRG: 191 | End: 2019-10-20
Payer: MEDICARE

## 2019-10-20 LAB
ANION GAP SERPL CALCULATED.3IONS-SCNC: 10 MMOL/L (ref 7–16)
BUN BLDV-MCNC: 31 MG/DL (ref 8–23)
C-REACTIVE PROTEIN: 0.6 MG/DL (ref 0–0.4)
CALCIUM SERPL-MCNC: 9.2 MG/DL (ref 8.6–10.2)
CHLORIDE BLD-SCNC: 99 MMOL/L (ref 98–107)
CO2: 26 MMOL/L (ref 22–29)
CREAT SERPL-MCNC: 1.1 MG/DL (ref 0.7–1.2)
GFR AFRICAN AMERICAN: >60
GFR NON-AFRICAN AMERICAN: >60 ML/MIN/1.73
GLUCOSE BLD-MCNC: 144 MG/DL (ref 74–99)
HCT VFR BLD CALC: 50.5 % (ref 37–54)
HEMOGLOBIN: 17.2 G/DL (ref 12.5–16.5)
MCH RBC QN AUTO: 32.4 PG (ref 26–35)
MCHC RBC AUTO-ENTMCNC: 34.1 % (ref 32–34.5)
MCV RBC AUTO: 95.1 FL (ref 80–99.9)
PDW BLD-RTO: 13.2 FL (ref 11.5–15)
PLATELET # BLD: 253 E9/L (ref 130–450)
PMV BLD AUTO: 9.5 FL (ref 7–12)
POTASSIUM SERPL-SCNC: 4.3 MMOL/L (ref 3.5–5)
PROCALCITONIN: 0.07 NG/ML (ref 0–0.08)
RBC # BLD: 5.31 E12/L (ref 3.8–5.8)
SODIUM BLD-SCNC: 135 MMOL/L (ref 132–146)
TROPONIN: <0.01 NG/ML (ref 0–0.03)
TSH SERPL DL<=0.05 MIU/L-ACNC: 0.28 UIU/ML (ref 0.27–4.2)
WBC # BLD: 15.3 E9/L (ref 4.5–11.5)

## 2019-10-20 PROCEDURE — 84484 ASSAY OF TROPONIN QUANT: CPT

## 2019-10-20 PROCEDURE — 87633 RESP VIRUS 12-25 TARGETS: CPT

## 2019-10-20 PROCEDURE — 2060000000 HC ICU INTERMEDIATE R&B

## 2019-10-20 PROCEDURE — 94640 AIRWAY INHALATION TREATMENT: CPT

## 2019-10-20 PROCEDURE — 87450 HC DIRECT STREP B ANTIGEN: CPT

## 2019-10-20 PROCEDURE — 85027 COMPLETE CBC AUTOMATED: CPT

## 2019-10-20 PROCEDURE — 36415 COLL VENOUS BLD VENIPUNCTURE: CPT

## 2019-10-20 PROCEDURE — 84145 PROCALCITONIN (PCT): CPT

## 2019-10-20 PROCEDURE — 6370000000 HC RX 637 (ALT 250 FOR IP): Performed by: NEUROMUSCULOSKELETAL MEDICINE & OMM

## 2019-10-20 PROCEDURE — 93005 ELECTROCARDIOGRAM TRACING: CPT | Performed by: NEUROMUSCULOSKELETAL MEDICINE & OMM

## 2019-10-20 PROCEDURE — APPSS45 APP SPLIT SHARED TIME 31-45 MINUTES: Performed by: PHYSICIAN ASSISTANT

## 2019-10-20 PROCEDURE — 6370000000 HC RX 637 (ALT 250 FOR IP): Performed by: INTERNAL MEDICINE

## 2019-10-20 PROCEDURE — 71045 X-RAY EXAM CHEST 1 VIEW: CPT

## 2019-10-20 PROCEDURE — 99222 1ST HOSP IP/OBS MODERATE 55: CPT | Performed by: INTERNAL MEDICINE

## 2019-10-20 PROCEDURE — 87070 CULTURE OTHR SPECIMN AEROBIC: CPT

## 2019-10-20 PROCEDURE — 94664 DEMO&/EVAL PT USE INHALER: CPT

## 2019-10-20 PROCEDURE — 87486 CHLMYD PNEUM DNA AMP PROBE: CPT

## 2019-10-20 PROCEDURE — 6360000002 HC RX W HCPCS: Performed by: INTERNAL MEDICINE

## 2019-10-20 PROCEDURE — 84443 ASSAY THYROID STIM HORMONE: CPT

## 2019-10-20 PROCEDURE — 87206 SMEAR FLUORESCENT/ACID STAI: CPT

## 2019-10-20 PROCEDURE — 2700000000 HC OXYGEN THERAPY PER DAY

## 2019-10-20 PROCEDURE — 6360000002 HC RX W HCPCS: Performed by: NEUROMUSCULOSKELETAL MEDICINE & OMM

## 2019-10-20 PROCEDURE — 87798 DETECT AGENT NOS DNA AMP: CPT

## 2019-10-20 PROCEDURE — 87581 M.PNEUMON DNA AMP PROBE: CPT

## 2019-10-20 PROCEDURE — 2580000003 HC RX 258: Performed by: FAMILY MEDICINE

## 2019-10-20 PROCEDURE — 86140 C-REACTIVE PROTEIN: CPT

## 2019-10-20 PROCEDURE — 80048 BASIC METABOLIC PNL TOTAL CA: CPT

## 2019-10-20 PROCEDURE — 2580000003 HC RX 258: Performed by: INTERNAL MEDICINE

## 2019-10-20 RX ORDER — ALBUTEROL SULFATE 2.5 MG/3ML
2.5 SOLUTION RESPIRATORY (INHALATION) EVERY 4 HOURS
Status: DISCONTINUED | OUTPATIENT
Start: 2019-10-20 | End: 2019-10-21 | Stop reason: HOSPADM

## 2019-10-20 RX ORDER — GUAIFENESIN 400 MG/1
400 TABLET ORAL 3 TIMES DAILY
Status: DISCONTINUED | OUTPATIENT
Start: 2019-10-20 | End: 2019-10-21 | Stop reason: HOSPADM

## 2019-10-20 RX ADMIN — GUAIFENESIN 400 MG: 400 TABLET ORAL at 21:27

## 2019-10-20 RX ADMIN — POTASSIUM CHLORIDE AND SODIUM CHLORIDE: 900; 150 INJECTION, SOLUTION INTRAVENOUS at 06:45

## 2019-10-20 RX ADMIN — ALBUTEROL SULFATE 2.5 MG: 2.5 SOLUTION RESPIRATORY (INHALATION) at 15:31

## 2019-10-20 RX ADMIN — CHLORTHALIDONE 25 MG: 25 TABLET ORAL at 08:23

## 2019-10-20 RX ADMIN — Medication 10 ML: at 08:39

## 2019-10-20 RX ADMIN — VANCOMYCIN HYDROCHLORIDE 2000 MG: 10 INJECTION, POWDER, LYOPHILIZED, FOR SOLUTION INTRAVENOUS at 21:29

## 2019-10-20 RX ADMIN — ALBUTEROL SULFATE 2.5 MG: 2.5 SOLUTION RESPIRATORY (INHALATION) at 19:52

## 2019-10-20 RX ADMIN — PREDNISONE 50 MG: 5 TABLET ORAL at 08:19

## 2019-10-20 RX ADMIN — LOSARTAN POTASSIUM 100 MG: 50 TABLET ORAL at 21:27

## 2019-10-20 RX ADMIN — PIPERACILLIN SODIUM AND TAZOBACTAM SODIUM 3.38 G: 3; .375 INJECTION, POWDER, LYOPHILIZED, FOR SOLUTION INTRAVENOUS at 08:37

## 2019-10-20 RX ADMIN — VANCOMYCIN HYDROCHLORIDE 2000 MG: 10 INJECTION, POWDER, LYOPHILIZED, FOR SOLUTION INTRAVENOUS at 09:42

## 2019-10-20 RX ADMIN — METOPROLOL TARTRATE 25 MG: 25 TABLET ORAL at 21:27

## 2019-10-20 RX ADMIN — PIPERACILLIN SODIUM AND TAZOBACTAM SODIUM 3.38 G: 3; .375 INJECTION, POWDER, LYOPHILIZED, FOR SOLUTION INTRAVENOUS at 16:24

## 2019-10-20 RX ADMIN — GUAIFENESIN 400 MG: 400 TABLET ORAL at 08:37

## 2019-10-20 RX ADMIN — METOPROLOL TARTRATE 25 MG: 25 TABLET ORAL at 08:23

## 2019-10-20 RX ADMIN — Medication 1 TABLET: at 08:20

## 2019-10-20 RX ADMIN — ALBUTEROL SULFATE 2.5 MG: 2.5 SOLUTION RESPIRATORY (INHALATION) at 09:25

## 2019-10-20 RX ADMIN — GUAIFENESIN 400 MG: 400 TABLET ORAL at 14:29

## 2019-10-20 RX ADMIN — ALBUTEROL SULFATE 2.5 MG: 2.5 SOLUTION RESPIRATORY (INHALATION) at 12:52

## 2019-10-20 ASSESSMENT — PAIN SCALES - GENERAL
PAINLEVEL_OUTOF10: 0

## 2019-10-20 ASSESSMENT — ENCOUNTER SYMPTOMS
NAUSEA: 0
ABDOMINAL PAIN: 0
SHORTNESS OF BREATH: 1
COLOR CHANGE: 0
VOMITING: 0
EYES NEGATIVE: 1
CHEST TIGHTNESS: 1
CHOKING: 0

## 2019-10-21 VITALS
DIASTOLIC BLOOD PRESSURE: 53 MMHG | WEIGHT: 315 LBS | OXYGEN SATURATION: 96 % | TEMPERATURE: 97.7 F | SYSTOLIC BLOOD PRESSURE: 117 MMHG | HEIGHT: 74 IN | BODY MASS INDEX: 40.43 KG/M2 | HEART RATE: 75 BPM | RESPIRATION RATE: 18 BRPM

## 2019-10-21 LAB
FILM ARRAY ADENOVIRUS: NORMAL
FILM ARRAY BORDETELLA PERTUSSIS: NORMAL
FILM ARRAY CHLAMYDOPHILIA PNEUMONIAE: NORMAL
FILM ARRAY CORONAVIRUS 229E: NORMAL
FILM ARRAY CORONAVIRUS HKU1: NORMAL
FILM ARRAY CORONAVIRUS NL63: NORMAL
FILM ARRAY CORONAVIRUS OC43: NORMAL
FILM ARRAY INFLUENZA A VIRUS 09H1: NORMAL
FILM ARRAY INFLUENZA A VIRUS H1: NORMAL
FILM ARRAY INFLUENZA A VIRUS H3: NORMAL
FILM ARRAY INFLUENZA A VIRUS: NORMAL
FILM ARRAY INFLUENZA B: NORMAL
FILM ARRAY METAPNEUMOVIRUS: NORMAL
FILM ARRAY MYCOPLASMA PNEUMONIAE: NORMAL
FILM ARRAY PARAINFLUENZA VIRUS 1: NORMAL
FILM ARRAY PARAINFLUENZA VIRUS 2: NORMAL
FILM ARRAY PARAINFLUENZA VIRUS 3: NORMAL
FILM ARRAY PARAINFLUENZA VIRUS 4: NORMAL
FILM ARRAY RESPIRATORY SYNCITIAL VIRUS: NORMAL
FILM ARRAY RHINOVIRUS/ENTEROVIRUS: NORMAL
L. PNEUMOPHILA SEROGP 1 UR AG: NORMAL
STREP PNEUMONIAE ANTIGEN, URINE: NORMAL

## 2019-10-21 PROCEDURE — 6360000002 HC RX W HCPCS: Performed by: INTERNAL MEDICINE

## 2019-10-21 PROCEDURE — 94640 AIRWAY INHALATION TREATMENT: CPT

## 2019-10-21 PROCEDURE — 6360000002 HC RX W HCPCS: Performed by: NEUROMUSCULOSKELETAL MEDICINE & OMM

## 2019-10-21 PROCEDURE — 6370000000 HC RX 637 (ALT 250 FOR IP): Performed by: NEUROMUSCULOSKELETAL MEDICINE & OMM

## 2019-10-21 PROCEDURE — 2580000003 HC RX 258: Performed by: INTERNAL MEDICINE

## 2019-10-21 PROCEDURE — 6370000000 HC RX 637 (ALT 250 FOR IP): Performed by: INTERNAL MEDICINE

## 2019-10-21 RX ORDER — AMOXICILLIN AND CLAVULANATE POTASSIUM 875; 125 MG/1; MG/1
1 TABLET, FILM COATED ORAL EVERY 12 HOURS SCHEDULED
Status: DISCONTINUED | OUTPATIENT
Start: 2019-10-21 | End: 2019-10-21 | Stop reason: HOSPADM

## 2019-10-21 RX ORDER — AMOXICILLIN AND CLAVULANATE POTASSIUM 875; 125 MG/1; MG/1
1 TABLET, FILM COATED ORAL EVERY 12 HOURS SCHEDULED
Qty: 20 TABLET | Refills: 0 | Status: SHIPPED | OUTPATIENT
Start: 2019-10-21 | End: 2019-10-21

## 2019-10-21 RX ORDER — AMOXICILLIN AND CLAVULANATE POTASSIUM 875; 125 MG/1; MG/1
1 TABLET, FILM COATED ORAL EVERY 12 HOURS SCHEDULED
Qty: 20 TABLET | Refills: 0 | Status: SHIPPED | OUTPATIENT
Start: 2019-10-21 | End: 2019-10-31

## 2019-10-21 RX ADMIN — PREDNISONE 50 MG: 5 TABLET ORAL at 09:08

## 2019-10-21 RX ADMIN — PIPERACILLIN SODIUM AND TAZOBACTAM SODIUM 3.38 G: 3; .375 INJECTION, POWDER, LYOPHILIZED, FOR SOLUTION INTRAVENOUS at 12:37

## 2019-10-21 RX ADMIN — PIPERACILLIN SODIUM AND TAZOBACTAM SODIUM 3.38 G: 3; .375 INJECTION, POWDER, LYOPHILIZED, FOR SOLUTION INTRAVENOUS at 01:25

## 2019-10-21 RX ADMIN — METOPROLOL TARTRATE 25 MG: 25 TABLET ORAL at 09:08

## 2019-10-21 RX ADMIN — CHLORTHALIDONE 25 MG: 25 TABLET ORAL at 09:09

## 2019-10-21 RX ADMIN — ALBUTEROL SULFATE 2.5 MG: 2.5 SOLUTION RESPIRATORY (INHALATION) at 15:49

## 2019-10-21 RX ADMIN — ALBUTEROL SULFATE 2.5 MG: 2.5 SOLUTION RESPIRATORY (INHALATION) at 08:18

## 2019-10-21 RX ADMIN — Medication 1 TABLET: at 09:08

## 2019-10-21 RX ADMIN — POTASSIUM CHLORIDE AND SODIUM CHLORIDE: 900; 150 INJECTION, SOLUTION INTRAVENOUS at 00:21

## 2019-10-21 RX ADMIN — ALBUTEROL SULFATE 2.5 MG: 2.5 SOLUTION RESPIRATORY (INHALATION) at 11:28

## 2019-10-21 RX ADMIN — GUAIFENESIN 400 MG: 400 TABLET ORAL at 12:38

## 2019-10-21 RX ADMIN — VANCOMYCIN HYDROCHLORIDE 2000 MG: 10 INJECTION, POWDER, LYOPHILIZED, FOR SOLUTION INTRAVENOUS at 09:08

## 2019-10-21 RX ADMIN — ALBUTEROL SULFATE 2.5 MG: 2.5 SOLUTION RESPIRATORY (INHALATION) at 00:31

## 2019-10-21 RX ADMIN — GUAIFENESIN 400 MG: 400 TABLET ORAL at 09:08

## 2019-10-21 ASSESSMENT — PAIN SCALES - GENERAL
PAINLEVEL_OUTOF10: 0
PAINLEVEL_OUTOF10: 0

## 2019-10-22 PROBLEM — R10.12 LEFT UPPER QUADRANT PAIN: Status: ACTIVE | Noted: 2019-10-22

## 2019-10-22 LAB
CULTURE, RESPIRATORY: NORMAL
EKG ATRIAL RATE: 111 BPM
EKG ATRIAL RATE: 60 BPM
EKG Q-T INTERVAL: 394 MS
EKG Q-T INTERVAL: 406 MS
EKG QRS DURATION: 82 MS
EKG QRS DURATION: 98 MS
EKG QTC CALCULATION (BAZETT): 449 MS
EKG QTC CALCULATION (BAZETT): 465 MS
EKG R AXIS: 38 DEGREES
EKG R AXIS: 6 DEGREES
EKG T AXIS: 21 DEGREES
EKG T AXIS: 52 DEGREES
EKG VENTRICULAR RATE: 78 BPM
EKG VENTRICULAR RATE: 79 BPM
SMEAR, RESPIRATORY: NORMAL

## 2019-10-22 PROCEDURE — 93010 ELECTROCARDIOGRAM REPORT: CPT | Performed by: INTERNAL MEDICINE

## 2019-10-29 ENCOUNTER — CLINICAL DOCUMENTATION (OUTPATIENT)
Dept: CARDIOLOGY CLINIC | Age: 64
End: 2019-10-29

## 2019-12-07 ENCOUNTER — APPOINTMENT (OUTPATIENT)
Dept: CT IMAGING | Age: 64
DRG: 603 | End: 2019-12-07
Payer: MEDICARE

## 2019-12-07 ENCOUNTER — HOSPITAL ENCOUNTER (INPATIENT)
Age: 64
LOS: 6 days | Discharge: HOME | DRG: 603 | End: 2019-12-13
Attending: EMERGENCY MEDICINE | Admitting: NEUROMUSCULOSKELETAL MEDICINE & OMM
Payer: MEDICARE

## 2019-12-07 DIAGNOSIS — A41.9 SEPTICEMIA (HCC): Primary | ICD-10-CM

## 2019-12-07 DIAGNOSIS — L03.313 CELLULITIS OF CHEST WALL: ICD-10-CM

## 2019-12-07 LAB
ABO/RH: NORMAL
ALBUMIN SERPL-MCNC: 4.2 G/DL (ref 3.5–5.2)
ALP BLD-CCNC: 133 U/L (ref 40–129)
ALT SERPL-CCNC: 13 U/L (ref 0–40)
ANION GAP SERPL CALCULATED.3IONS-SCNC: 19 MMOL/L (ref 7–16)
ANTIBODY SCREEN: NORMAL
AST SERPL-CCNC: 11 U/L (ref 0–39)
B.E.: 0.1 MMOL/L (ref -3–3)
BASOPHILS ABSOLUTE: 0.04 E9/L (ref 0–0.2)
BASOPHILS RELATIVE PERCENT: 0.2 % (ref 0–2)
BILIRUB SERPL-MCNC: 1.3 MG/DL (ref 0–1.2)
BUN BLDV-MCNC: 26 MG/DL (ref 8–23)
CALCIUM SERPL-MCNC: 9.5 MG/DL (ref 8.6–10.2)
CHLORIDE BLD-SCNC: 86 MMOL/L (ref 98–107)
CO2: 24 MMOL/L (ref 22–29)
COHB: 1.4 % (ref 0–1.5)
CREAT SERPL-MCNC: 1.7 MG/DL (ref 0.7–1.2)
CRITICAL: ABNORMAL
DATE ANALYZED: ABNORMAL
DATE OF COLLECTION: ABNORMAL
EOSINOPHILS ABSOLUTE: 0.01 E9/L (ref 0.05–0.5)
EOSINOPHILS RELATIVE PERCENT: 0 % (ref 0–6)
GFR AFRICAN AMERICAN: 49
GFR NON-AFRICAN AMERICAN: 41 ML/MIN/1.73
GLUCOSE BLD-MCNC: 154 MG/DL (ref 74–99)
HCO3: 21 MMOL/L (ref 22–26)
HCT VFR BLD CALC: 43.1 % (ref 37–54)
HEMOGLOBIN: 15.3 G/DL (ref 12.5–16.5)
HHB: 4.7 % (ref 0–5)
IMMATURE GRANULOCYTES #: 0.32 E9/L
IMMATURE GRANULOCYTES %: 1.5 % (ref 0–5)
INR BLD: 2.2
LAB: ABNORMAL
LACTIC ACID, SEPSIS: 2.2 MMOL/L (ref 0.5–1.9)
LYMPHOCYTES ABSOLUTE: 0.85 E9/L (ref 1.5–4)
LYMPHOCYTES RELATIVE PERCENT: 4 % (ref 20–42)
Lab: ABNORMAL
MAGNESIUM: 1.9 MG/DL (ref 1.6–2.6)
MCH RBC QN AUTO: 32.7 PG (ref 26–35)
MCHC RBC AUTO-ENTMCNC: 35.5 % (ref 32–34.5)
MCV RBC AUTO: 92.1 FL (ref 80–99.9)
METHB: 0.3 % (ref 0–1.5)
MODE: ABNORMAL
MONOCYTES ABSOLUTE: 1.94 E9/L (ref 0.1–0.95)
MONOCYTES RELATIVE PERCENT: 9.1 % (ref 2–12)
NEUTROPHILS ABSOLUTE: 18.16 E9/L (ref 1.8–7.3)
NEUTROPHILS RELATIVE PERCENT: 85.2 % (ref 43–80)
O2 CONTENT: 18.6 ML/DL
O2 SATURATION: 95.2 % (ref 92–98.5)
O2HB: 93.6 % (ref 94–97)
OPERATOR ID: 46
PATIENT TEMP: 37 C
PCO2: 24.9 MMHG (ref 35–45)
PDW BLD-RTO: 13.6 FL (ref 11.5–15)
PH BLOOD GAS: 7.54 (ref 7.35–7.45)
PLATELET # BLD: 226 E9/L (ref 130–450)
PMV BLD AUTO: 9.6 FL (ref 7–12)
PO2: 69.4 MMHG (ref 60–100)
POTASSIUM REFLEX MAGNESIUM: 2.9 MMOL/L (ref 3.5–5)
PRO-BNP: 3057 PG/ML (ref 0–125)
PROTHROMBIN TIME: 26 SEC (ref 9.3–12.4)
RBC # BLD: 4.68 E12/L (ref 3.8–5.8)
RBC # BLD: NORMAL 10*6/UL
SODIUM BLD-SCNC: 129 MMOL/L (ref 132–146)
SOURCE, BLOOD GAS: ABNORMAL
THB: 14.1 G/DL (ref 11.5–16.5)
TIME ANALYZED: 2049
TOTAL PROTEIN: 7.7 G/DL (ref 6.4–8.3)
TROPONIN: <0.01 NG/ML (ref 0–0.03)
WBC # BLD: 21.3 E9/L (ref 4.5–11.5)

## 2019-12-07 PROCEDURE — 83605 ASSAY OF LACTIC ACID: CPT

## 2019-12-07 PROCEDURE — 2580000003 HC RX 258: Performed by: PHYSICIAN ASSISTANT

## 2019-12-07 PROCEDURE — 0100U HC RESPIRPTHGN MULT REV TRANS & AMP PRB TECH 21 TRGT: CPT

## 2019-12-07 PROCEDURE — 94760 N-INVAS EAR/PLS OXIMETRY 1: CPT

## 2019-12-07 PROCEDURE — 71250 CT THORAX DX C-: CPT

## 2019-12-07 PROCEDURE — 86900 BLOOD TYPING SEROLOGIC ABO: CPT

## 2019-12-07 PROCEDURE — 6360000002 HC RX W HCPCS: Performed by: EMERGENCY MEDICINE

## 2019-12-07 PROCEDURE — 83880 ASSAY OF NATRIURETIC PEPTIDE: CPT

## 2019-12-07 PROCEDURE — 86901 BLOOD TYPING SEROLOGIC RH(D): CPT

## 2019-12-07 PROCEDURE — 85025 COMPLETE CBC W/AUTO DIFF WBC: CPT

## 2019-12-07 PROCEDURE — 84484 ASSAY OF TROPONIN QUANT: CPT

## 2019-12-07 PROCEDURE — 82805 BLOOD GASES W/O2 SATURATION: CPT

## 2019-12-07 PROCEDURE — 87040 BLOOD CULTURE FOR BACTERIA: CPT

## 2019-12-07 PROCEDURE — 80053 COMPREHEN METABOLIC PANEL: CPT

## 2019-12-07 PROCEDURE — 85610 PROTHROMBIN TIME: CPT

## 2019-12-07 PROCEDURE — 86850 RBC ANTIBODY SCREEN: CPT

## 2019-12-07 PROCEDURE — 6370000000 HC RX 637 (ALT 250 FOR IP): Performed by: EMERGENCY MEDICINE

## 2019-12-07 PROCEDURE — 1200000000 HC SEMI PRIVATE

## 2019-12-07 PROCEDURE — 72131 CT LUMBAR SPINE W/O DYE: CPT

## 2019-12-07 PROCEDURE — 6360000002 HC RX W HCPCS: Performed by: PHYSICIAN ASSISTANT

## 2019-12-07 PROCEDURE — 2500000003 HC RX 250 WO HCPCS: Performed by: INTERNAL MEDICINE

## 2019-12-07 PROCEDURE — 6360000002 HC RX W HCPCS: Performed by: INTERNAL MEDICINE

## 2019-12-07 PROCEDURE — 2580000003 HC RX 258: Performed by: EMERGENCY MEDICINE

## 2019-12-07 PROCEDURE — 74176 CT ABD & PELVIS W/O CONTRAST: CPT

## 2019-12-07 PROCEDURE — 96366 THER/PROPH/DIAG IV INF ADDON: CPT

## 2019-12-07 PROCEDURE — 99291 CRITICAL CARE FIRST HOUR: CPT

## 2019-12-07 PROCEDURE — 36415 COLL VENOUS BLD VENIPUNCTURE: CPT

## 2019-12-07 PROCEDURE — 93005 ELECTROCARDIOGRAM TRACING: CPT | Performed by: EMERGENCY MEDICINE

## 2019-12-07 PROCEDURE — 83735 ASSAY OF MAGNESIUM: CPT

## 2019-12-07 PROCEDURE — 96365 THER/PROPH/DIAG IV INF INIT: CPT

## 2019-12-07 RX ORDER — SODIUM CHLORIDE 0.9 % (FLUSH) 0.9 %
10 SYRINGE (ML) INJECTION PRN
Status: DISCONTINUED | OUTPATIENT
Start: 2019-12-07 | End: 2019-12-13 | Stop reason: HOSPADM

## 2019-12-07 RX ORDER — CLINDAMYCIN PHOSPHATE 900 MG/50ML
900 INJECTION INTRAVENOUS EVERY 8 HOURS
Status: DISCONTINUED | OUTPATIENT
Start: 2019-12-07 | End: 2019-12-13

## 2019-12-07 RX ORDER — CHLORTHALIDONE 25 MG/1
25 TABLET ORAL DAILY
Status: DISCONTINUED | OUTPATIENT
Start: 2019-12-08 | End: 2019-12-09

## 2019-12-07 RX ORDER — ACETAMINOPHEN 500 MG
1000 TABLET ORAL ONCE
Status: COMPLETED | OUTPATIENT
Start: 2019-12-07 | End: 2019-12-07

## 2019-12-07 RX ORDER — LORAZEPAM 1 MG/1
2 TABLET ORAL
Status: DISCONTINUED | OUTPATIENT
Start: 2019-12-07 | End: 2019-12-13 | Stop reason: HOSPADM

## 2019-12-07 RX ORDER — LINEZOLID 2 MG/ML
600 INJECTION, SOLUTION INTRAVENOUS EVERY 12 HOURS
Status: DISCONTINUED | OUTPATIENT
Start: 2019-12-07 | End: 2019-12-13

## 2019-12-07 RX ORDER — LORAZEPAM 1 MG/1
4 TABLET ORAL
Status: DISCONTINUED | OUTPATIENT
Start: 2019-12-07 | End: 2019-12-13 | Stop reason: HOSPADM

## 2019-12-07 RX ORDER — LORAZEPAM 1 MG/1
3 TABLET ORAL
Status: DISCONTINUED | OUTPATIENT
Start: 2019-12-07 | End: 2019-12-13 | Stop reason: HOSPADM

## 2019-12-07 RX ORDER — ONDANSETRON 2 MG/ML
4 INJECTION INTRAMUSCULAR; INTRAVENOUS EVERY 6 HOURS PRN
Status: DISCONTINUED | OUTPATIENT
Start: 2019-12-07 | End: 2019-12-13 | Stop reason: HOSPADM

## 2019-12-07 RX ORDER — SODIUM CHLORIDE 0.9 % (FLUSH) 0.9 %
10 SYRINGE (ML) INJECTION EVERY 12 HOURS SCHEDULED
Status: DISCONTINUED | OUTPATIENT
Start: 2019-12-08 | End: 2019-12-13 | Stop reason: HOSPADM

## 2019-12-07 RX ORDER — M-VIT,TX,IRON,MINS/CALC/FOLIC 27MG-0.4MG
1 TABLET ORAL DAILY
Status: DISCONTINUED | OUTPATIENT
Start: 2019-12-08 | End: 2019-12-13 | Stop reason: HOSPADM

## 2019-12-07 RX ORDER — POTASSIUM CHLORIDE 7.45 MG/ML
10 INJECTION INTRAVENOUS ONCE
Status: COMPLETED | OUTPATIENT
Start: 2019-12-07 | End: 2019-12-07

## 2019-12-07 RX ORDER — LORAZEPAM 2 MG/ML
3 INJECTION INTRAMUSCULAR
Status: DISCONTINUED | OUTPATIENT
Start: 2019-12-07 | End: 2019-12-13 | Stop reason: HOSPADM

## 2019-12-07 RX ORDER — ACETAMINOPHEN 325 MG/1
650 TABLET ORAL EVERY 6 HOURS PRN
Status: DISCONTINUED | OUTPATIENT
Start: 2019-12-07 | End: 2019-12-13 | Stop reason: HOSPADM

## 2019-12-07 RX ORDER — LORAZEPAM 2 MG/ML
4 INJECTION INTRAMUSCULAR
Status: DISCONTINUED | OUTPATIENT
Start: 2019-12-07 | End: 2019-12-13 | Stop reason: HOSPADM

## 2019-12-07 RX ORDER — 0.9 % SODIUM CHLORIDE 0.9 %
30 INTRAVENOUS SOLUTION INTRAVENOUS ONCE
Status: COMPLETED | OUTPATIENT
Start: 2019-12-07 | End: 2019-12-07

## 2019-12-07 RX ORDER — ALBUTEROL SULFATE 90 UG/1
2 AEROSOL, METERED RESPIRATORY (INHALATION) EVERY 4 HOURS PRN
Status: DISCONTINUED | OUTPATIENT
Start: 2019-12-07 | End: 2019-12-13 | Stop reason: HOSPADM

## 2019-12-07 RX ORDER — IRBESARTAN 300 MG/1
300 TABLET ORAL NIGHTLY
Status: DISCONTINUED | OUTPATIENT
Start: 2019-12-08 | End: 2019-12-13 | Stop reason: HOSPADM

## 2019-12-07 RX ORDER — LORAZEPAM 1 MG/1
1 TABLET ORAL
Status: DISCONTINUED | OUTPATIENT
Start: 2019-12-07 | End: 2019-12-13 | Stop reason: HOSPADM

## 2019-12-07 RX ORDER — LORAZEPAM 2 MG/ML
1 INJECTION INTRAMUSCULAR
Status: DISCONTINUED | OUTPATIENT
Start: 2019-12-07 | End: 2019-12-13 | Stop reason: HOSPADM

## 2019-12-07 RX ORDER — SODIUM CHLORIDE 9 MG/ML
INJECTION, SOLUTION INTRAVENOUS CONTINUOUS
Status: DISCONTINUED | OUTPATIENT
Start: 2019-12-08 | End: 2019-12-08

## 2019-12-07 RX ORDER — POTASSIUM CHLORIDE 20 MEQ/1
40 TABLET, EXTENDED RELEASE ORAL ONCE
Status: COMPLETED | OUTPATIENT
Start: 2019-12-07 | End: 2019-12-07

## 2019-12-07 RX ORDER — LORAZEPAM 2 MG/ML
2 INJECTION INTRAMUSCULAR
Status: DISCONTINUED | OUTPATIENT
Start: 2019-12-07 | End: 2019-12-13 | Stop reason: HOSPADM

## 2019-12-07 RX ADMIN — VANCOMYCIN HYDROCHLORIDE 2000 MG: 10 INJECTION, POWDER, LYOPHILIZED, FOR SOLUTION INTRAVENOUS at 20:52

## 2019-12-07 RX ADMIN — MEROPENEM 1 G: 1 INJECTION, POWDER, FOR SOLUTION INTRAVENOUS at 19:20

## 2019-12-07 RX ADMIN — ACETAMINOPHEN 1000 MG: 500 TABLET ORAL at 20:19

## 2019-12-07 RX ADMIN — CLINDAMYCIN IN 5 PERCENT DEXTROSE 900 MG: 18 INJECTION, SOLUTION INTRAVENOUS at 23:36

## 2019-12-07 RX ADMIN — POTASSIUM CHLORIDE 10 MEQ: 7.46 INJECTION, SOLUTION INTRAVENOUS at 22:46

## 2019-12-07 RX ADMIN — POTASSIUM CHLORIDE 40 MEQ: 20 TABLET, EXTENDED RELEASE ORAL at 21:16

## 2019-12-07 RX ADMIN — POTASSIUM CHLORIDE 10 MEQ: 7.46 INJECTION, SOLUTION INTRAVENOUS at 21:08

## 2019-12-07 RX ADMIN — LINEZOLID 600 MG: 600 INJECTION, SOLUTION INTRAVENOUS at 23:50

## 2019-12-07 RX ADMIN — SODIUM CHLORIDE 4287 ML: 9 INJECTION, SOLUTION INTRAVENOUS at 18:51

## 2019-12-07 ASSESSMENT — PAIN DESCRIPTION - PAIN TYPE
TYPE: ACUTE PAIN
TYPE: ACUTE PAIN

## 2019-12-07 ASSESSMENT — PAIN DESCRIPTION - DESCRIPTORS
DESCRIPTORS: ACHING;THROBBING;PRESSURE
DESCRIPTORS: ACHING

## 2019-12-07 ASSESSMENT — PAIN DESCRIPTION - ONSET
ONSET: GRADUAL
ONSET: GRADUAL

## 2019-12-07 ASSESSMENT — PAIN SCALES - GENERAL
PAINLEVEL_OUTOF10: 7

## 2019-12-07 ASSESSMENT — PAIN DESCRIPTION - FREQUENCY
FREQUENCY: CONTINUOUS
FREQUENCY: CONTINUOUS

## 2019-12-07 ASSESSMENT — PAIN DESCRIPTION - PROGRESSION
CLINICAL_PROGRESSION: GRADUALLY WORSENING
CLINICAL_PROGRESSION: GRADUALLY WORSENING

## 2019-12-07 ASSESSMENT — ENCOUNTER SYMPTOMS
WHEEZING: 0
COLOR CHANGE: 1
CHEST TIGHTNESS: 0
SHORTNESS OF BREATH: 1
COUGH: 0

## 2019-12-07 ASSESSMENT — PAIN DESCRIPTION - ORIENTATION
ORIENTATION: LEFT
ORIENTATION: RIGHT

## 2019-12-08 ENCOUNTER — APPOINTMENT (OUTPATIENT)
Dept: ULTRASOUND IMAGING | Age: 64
DRG: 603 | End: 2019-12-08
Payer: MEDICARE

## 2019-12-08 LAB
ADENOVIRUS BY PCR: NOT DETECTED
ALBUMIN SERPL-MCNC: 2.9 G/DL (ref 3.5–5.2)
ALP BLD-CCNC: 71 U/L (ref 40–129)
ALT SERPL-CCNC: 13 U/L (ref 0–40)
ANION GAP SERPL CALCULATED.3IONS-SCNC: 13 MMOL/L (ref 7–16)
ANION GAP SERPL CALCULATED.3IONS-SCNC: 9 MMOL/L (ref 7–16)
ANION GAP SERPL CALCULATED.3IONS-SCNC: 9 MMOL/L (ref 7–16)
AST SERPL-CCNC: 11 U/L (ref 0–39)
BILIRUB SERPL-MCNC: 1.4 MG/DL (ref 0–1.2)
BORDETELLA PARAPERTUSSIS BY PCR: NOT DETECTED
BORDETELLA PERTUSSIS BY PCR: NOT DETECTED
BUN BLDV-MCNC: 26 MG/DL (ref 8–23)
BUN BLDV-MCNC: 27 MG/DL (ref 8–23)
BUN BLDV-MCNC: 27 MG/DL (ref 8–23)
C-REACTIVE PROTEIN: 30.9 MG/DL (ref 0–0.4)
CALCIUM SERPL-MCNC: 7.2 MG/DL (ref 8.6–10.2)
CALCIUM SERPL-MCNC: 7.7 MG/DL (ref 8.6–10.2)
CALCIUM SERPL-MCNC: 8 MG/DL (ref 8.6–10.2)
CHLAMYDOPHILIA PNEUMONIAE BY PCR: NOT DETECTED
CHLORIDE BLD-SCNC: 94 MMOL/L (ref 98–107)
CHLORIDE BLD-SCNC: 96 MMOL/L (ref 98–107)
CHLORIDE BLD-SCNC: 99 MMOL/L (ref 98–107)
CHLORIDE URINE RANDOM: 92 MMOL/L
CO2: 22 MMOL/L (ref 22–29)
CO2: 22 MMOL/L (ref 22–29)
CO2: 25 MMOL/L (ref 22–29)
CORONAVIRUS 229E BY PCR: NOT DETECTED
CORONAVIRUS HKU1 BY PCR: NOT DETECTED
CORONAVIRUS NL63 BY PCR: NOT DETECTED
CORONAVIRUS OC43 BY PCR: NOT DETECTED
CREAT SERPL-MCNC: 1.5 MG/DL (ref 0.7–1.2)
CREAT SERPL-MCNC: 1.6 MG/DL (ref 0.7–1.2)
CREAT SERPL-MCNC: 2 MG/DL (ref 0.7–1.2)
CREATININE URINE: 178 MG/DL (ref 40–278)
EKG ATRIAL RATE: 174 BPM
EKG Q-T INTERVAL: 356 MS
EKG QRS DURATION: 96 MS
EKG QTC CALCULATION (BAZETT): 454 MS
EKG R AXIS: 7 DEGREES
EKG T AXIS: -10 DEGREES
EKG VENTRICULAR RATE: 98 BPM
GFR AFRICAN AMERICAN: 41
GFR AFRICAN AMERICAN: 53
GFR AFRICAN AMERICAN: 57
GFR NON-AFRICAN AMERICAN: 34 ML/MIN/1.73
GFR NON-AFRICAN AMERICAN: 44 ML/MIN/1.73
GFR NON-AFRICAN AMERICAN: 47 ML/MIN/1.73
GLUCOSE BLD-MCNC: 126 MG/DL (ref 74–99)
GLUCOSE BLD-MCNC: 137 MG/DL (ref 74–99)
GLUCOSE BLD-MCNC: 158 MG/DL (ref 74–99)
HCT VFR BLD CALC: 37.7 % (ref 37–54)
HEMOGLOBIN: 13.1 G/DL (ref 12.5–16.5)
HUMAN METAPNEUMOVIRUS BY PCR: NOT DETECTED
HUMAN RHINOVIRUS/ENTEROVIRUS BY PCR: NOT DETECTED
INFLUENZA A BY PCR: NOT DETECTED
INFLUENZA B BY PCR: NOT DETECTED
LACTIC ACID: 1.8 MMOL/L (ref 0.5–2.2)
MAGNESIUM: 1.7 MG/DL (ref 1.6–2.6)
MCH RBC QN AUTO: 32.4 PG (ref 26–35)
MCHC RBC AUTO-ENTMCNC: 34.7 % (ref 32–34.5)
MCV RBC AUTO: 93.3 FL (ref 80–99.9)
MYCOPLASMA PNEUMONIAE BY PCR: NOT DETECTED
OSMOLALITY URINE: 758 MOSM/KG (ref 300–900)
PARAINFLUENZA VIRUS 1 BY PCR: NOT DETECTED
PARAINFLUENZA VIRUS 2 BY PCR: NOT DETECTED
PARAINFLUENZA VIRUS 3 BY PCR: NOT DETECTED
PARAINFLUENZA VIRUS 4 BY PCR: NOT DETECTED
PDW BLD-RTO: 13.4 FL (ref 11.5–15)
PHOSPHORUS: 2.2 MG/DL (ref 2.5–4.5)
PLATELET # BLD: 172 E9/L (ref 130–450)
PMV BLD AUTO: 9.4 FL (ref 7–12)
POTASSIUM SERPL-SCNC: 2.9 MMOL/L (ref 3.5–5)
POTASSIUM SERPL-SCNC: 3.3 MMOL/L (ref 3.5–5)
POTASSIUM SERPL-SCNC: 5.2 MMOL/L (ref 3.5–5)
RBC # BLD: 4.04 E12/L (ref 3.8–5.8)
RESPIRATORY SYNCYTIAL VIRUS BY PCR: NOT DETECTED
SEDIMENTATION RATE, ERYTHROCYTE: 39 MM/HR (ref 0–15)
SODIUM BLD-SCNC: 128 MMOL/L (ref 132–146)
SODIUM BLD-SCNC: 130 MMOL/L (ref 132–146)
SODIUM BLD-SCNC: 131 MMOL/L (ref 132–146)
SODIUM URINE: 21 MMOL/L
TOTAL PROTEIN: 6 G/DL (ref 6.4–8.3)
WBC # BLD: 17.4 E9/L (ref 4.5–11.5)

## 2019-12-08 PROCEDURE — 2060000000 HC ICU INTERMEDIATE R&B

## 2019-12-08 PROCEDURE — 80053 COMPREHEN METABOLIC PANEL: CPT

## 2019-12-08 PROCEDURE — 93010 ELECTROCARDIOGRAM REPORT: CPT | Performed by: INTERNAL MEDICINE

## 2019-12-08 PROCEDURE — 36415 COLL VENOUS BLD VENIPUNCTURE: CPT

## 2019-12-08 PROCEDURE — 83735 ASSAY OF MAGNESIUM: CPT

## 2019-12-08 PROCEDURE — 85027 COMPLETE CBC AUTOMATED: CPT

## 2019-12-08 PROCEDURE — 82436 ASSAY OF URINE CHLORIDE: CPT

## 2019-12-08 PROCEDURE — 6360000002 HC RX W HCPCS: Performed by: INTERNAL MEDICINE

## 2019-12-08 PROCEDURE — 6370000000 HC RX 637 (ALT 250 FOR IP): Performed by: NEUROMUSCULOSKELETAL MEDICINE & OMM

## 2019-12-08 PROCEDURE — 83935 ASSAY OF URINE OSMOLALITY: CPT

## 2019-12-08 PROCEDURE — 2580000003 HC RX 258: Performed by: NEUROMUSCULOSKELETAL MEDICINE & OMM

## 2019-12-08 PROCEDURE — 6360000002 HC RX W HCPCS: Performed by: NEUROMUSCULOSKELETAL MEDICINE & OMM

## 2019-12-08 PROCEDURE — 2580000003 HC RX 258: Performed by: INTERNAL MEDICINE

## 2019-12-08 PROCEDURE — 86140 C-REACTIVE PROTEIN: CPT

## 2019-12-08 PROCEDURE — 2500000003 HC RX 250 WO HCPCS: Performed by: INTERNAL MEDICINE

## 2019-12-08 PROCEDURE — 84100 ASSAY OF PHOSPHORUS: CPT

## 2019-12-08 PROCEDURE — 85651 RBC SED RATE NONAUTOMATED: CPT

## 2019-12-08 PROCEDURE — 83605 ASSAY OF LACTIC ACID: CPT

## 2019-12-08 PROCEDURE — 76999 ECHO EXAMINATION PROCEDURE: CPT

## 2019-12-08 PROCEDURE — 82570 ASSAY OF URINE CREATININE: CPT

## 2019-12-08 PROCEDURE — 84300 ASSAY OF URINE SODIUM: CPT

## 2019-12-08 PROCEDURE — 80048 BASIC METABOLIC PNL TOTAL CA: CPT

## 2019-12-08 PROCEDURE — 98960 EDU&TRN PT SELF-MGMT NQHP 1: CPT

## 2019-12-08 RX ORDER — SODIUM CHLORIDE 9 MG/ML
INJECTION, SOLUTION INTRAVENOUS CONTINUOUS
Status: DISCONTINUED | OUTPATIENT
Start: 2019-12-08 | End: 2019-12-13 | Stop reason: HOSPADM

## 2019-12-08 RX ORDER — POTASSIUM CHLORIDE AND SODIUM CHLORIDE 900; 300 MG/100ML; MG/100ML
INJECTION, SOLUTION INTRAVENOUS CONTINUOUS
Status: DISCONTINUED | OUTPATIENT
Start: 2019-12-08 | End: 2019-12-08

## 2019-12-08 RX ADMIN — Medication 1 TABLET: at 09:46

## 2019-12-08 RX ADMIN — IRBESARTAN 300 MG: 300 TABLET ORAL at 21:36

## 2019-12-08 RX ADMIN — LINEZOLID 600 MG: 600 INJECTION, SOLUTION INTRAVENOUS at 11:19

## 2019-12-08 RX ADMIN — ONDANSETRON 4 MG: 2 INJECTION INTRAMUSCULAR; INTRAVENOUS at 00:56

## 2019-12-08 RX ADMIN — ACETAMINOPHEN 650 MG: 325 TABLET ORAL at 00:56

## 2019-12-08 RX ADMIN — METOPROLOL TARTRATE 25 MG: 25 TABLET ORAL at 09:46

## 2019-12-08 RX ADMIN — SODIUM CHLORIDE: 9 INJECTION, SOLUTION INTRAVENOUS at 15:38

## 2019-12-08 RX ADMIN — CLINDAMYCIN IN 5 PERCENT DEXTROSE 900 MG: 18 INJECTION, SOLUTION INTRAVENOUS at 23:31

## 2019-12-08 RX ADMIN — CLINDAMYCIN IN 5 PERCENT DEXTROSE 900 MG: 18 INJECTION, SOLUTION INTRAVENOUS at 06:20

## 2019-12-08 RX ADMIN — LORAZEPAM 1 MG: 1 TABLET ORAL at 00:12

## 2019-12-08 RX ADMIN — POTASSIUM CHLORIDE AND SODIUM CHLORIDE: 900; 300 INJECTION, SOLUTION INTRAVENOUS at 09:45

## 2019-12-08 RX ADMIN — LINEZOLID 600 MG: 600 INJECTION, SOLUTION INTRAVENOUS at 21:36

## 2019-12-08 RX ADMIN — APIXABAN 5 MG: 5 TABLET, FILM COATED ORAL at 09:46

## 2019-12-08 RX ADMIN — CHLORTHALIDONE 25 MG: 25 TABLET ORAL at 09:46

## 2019-12-08 RX ADMIN — APIXABAN 5 MG: 5 TABLET, FILM COATED ORAL at 21:35

## 2019-12-08 RX ADMIN — ONDANSETRON 4 MG: 2 INJECTION INTRAMUSCULAR; INTRAVENOUS at 05:23

## 2019-12-08 RX ADMIN — METOPROLOL TARTRATE 25 MG: 25 TABLET ORAL at 01:21

## 2019-12-08 RX ADMIN — CLINDAMYCIN IN 5 PERCENT DEXTROSE 900 MG: 18 INJECTION, SOLUTION INTRAVENOUS at 14:16

## 2019-12-08 RX ADMIN — ACETAMINOPHEN 650 MG: 325 TABLET ORAL at 19:11

## 2019-12-08 RX ADMIN — METOPROLOL TARTRATE 25 MG: 25 TABLET ORAL at 21:35

## 2019-12-08 RX ADMIN — LORAZEPAM 1 MG: 1 TABLET ORAL at 01:42

## 2019-12-08 RX ADMIN — SODIUM CHLORIDE: 9 INJECTION, SOLUTION INTRAVENOUS at 00:14

## 2019-12-08 RX ADMIN — ONDANSETRON 4 MG: 2 INJECTION INTRAMUSCULAR; INTRAVENOUS at 12:51

## 2019-12-08 ASSESSMENT — PAIN DESCRIPTION - FREQUENCY: FREQUENCY: CONTINUOUS

## 2019-12-08 ASSESSMENT — PAIN SCALES - GENERAL
PAINLEVEL_OUTOF10: 7
PAINLEVEL_OUTOF10: 0
PAINLEVEL_OUTOF10: 5
PAINLEVEL_OUTOF10: 0
PAINLEVEL_OUTOF10: 4
PAINLEVEL_OUTOF10: 0
PAINLEVEL_OUTOF10: 7

## 2019-12-08 ASSESSMENT — PAIN SCALES - WONG BAKER: WONGBAKER_NUMERICALRESPONSE: 0

## 2019-12-08 ASSESSMENT — PAIN DESCRIPTION - ONSET: ONSET: GRADUAL

## 2019-12-08 ASSESSMENT — PAIN DESCRIPTION - DESCRIPTORS: DESCRIPTORS: ACHING

## 2019-12-08 ASSESSMENT — PAIN DESCRIPTION - PROGRESSION: CLINICAL_PROGRESSION: GRADUALLY WORSENING

## 2019-12-08 ASSESSMENT — PAIN DESCRIPTION - PAIN TYPE: TYPE: ACUTE PAIN

## 2019-12-08 ASSESSMENT — PAIN DESCRIPTION - ORIENTATION: ORIENTATION: LEFT

## 2019-12-09 LAB
ANION GAP SERPL CALCULATED.3IONS-SCNC: 10 MMOL/L (ref 7–16)
ANISOCYTOSIS: ABNORMAL
BASOPHILS ABSOLUTE: 0.08 E9/L (ref 0–0.2)
BASOPHILS RELATIVE PERCENT: 0.4 % (ref 0–2)
BUN BLDV-MCNC: 28 MG/DL (ref 8–23)
CALCIUM IONIZED: 1.14 MMOL/L (ref 1.15–1.33)
CALCIUM SERPL-MCNC: 7.9 MG/DL (ref 8.6–10.2)
CHLORIDE BLD-SCNC: 91 MMOL/L (ref 98–107)
CO2: 27 MMOL/L (ref 22–29)
CREAT SERPL-MCNC: 1.5 MG/DL (ref 0.7–1.2)
EOSINOPHILS ABSOLUTE: 0.06 E9/L (ref 0.05–0.5)
EOSINOPHILS RELATIVE PERCENT: 0.3 % (ref 0–6)
GFR AFRICAN AMERICAN: 57
GFR NON-AFRICAN AMERICAN: 47 ML/MIN/1.73
GLUCOSE BLD-MCNC: 125 MG/DL (ref 74–99)
HCT VFR BLD CALC: 35.9 % (ref 37–54)
HEMOGLOBIN: 12.2 G/DL (ref 12.5–16.5)
IMMATURE GRANULOCYTES #: 0.19 E9/L
IMMATURE GRANULOCYTES %: 1 % (ref 0–5)
LYMPHOCYTES ABSOLUTE: 1.3 E9/L (ref 1.5–4)
LYMPHOCYTES RELATIVE PERCENT: 6.8 % (ref 20–42)
MAGNESIUM: 1.8 MG/DL (ref 1.6–2.6)
MCH RBC QN AUTO: 32.4 PG (ref 26–35)
MCHC RBC AUTO-ENTMCNC: 34 % (ref 32–34.5)
MCV RBC AUTO: 95.2 FL (ref 80–99.9)
MONOCYTES ABSOLUTE: 1.41 E9/L (ref 0.1–0.95)
MONOCYTES RELATIVE PERCENT: 7.3 % (ref 2–12)
NEUTROPHILS ABSOLUTE: 16.19 E9/L (ref 1.8–7.3)
NEUTROPHILS RELATIVE PERCENT: 84.2 % (ref 43–80)
PARATHYROID HORMONE INTACT: 203 PG/ML (ref 15–65)
PDW BLD-RTO: 13.6 FL (ref 11.5–15)
PHOSPHORUS: 2 MG/DL (ref 2.5–4.5)
PHOSPHORUS: 2.8 MG/DL (ref 2.5–4.5)
PLATELET # BLD: 200 E9/L (ref 130–450)
PMV BLD AUTO: 10.2 FL (ref 7–12)
POTASSIUM SERPL-SCNC: 2.9 MMOL/L (ref 3.5–5)
POTASSIUM SERPL-SCNC: 2.9 MMOL/L (ref 3.5–5)
POTASSIUM SERPL-SCNC: 3.7 MMOL/L (ref 3.5–5)
RBC # BLD: 3.77 E12/L (ref 3.8–5.8)
SODIUM BLD-SCNC: 128 MMOL/L (ref 132–146)
VITAMIN D 25-HYDROXY: 21 NG/ML (ref 30–100)
WBC # BLD: 19.2 E9/L (ref 4.5–11.5)

## 2019-12-09 PROCEDURE — 2580000003 HC RX 258: Performed by: INTERNAL MEDICINE

## 2019-12-09 PROCEDURE — 6360000002 HC RX W HCPCS: Performed by: INTERNAL MEDICINE

## 2019-12-09 PROCEDURE — 2500000003 HC RX 250 WO HCPCS: Performed by: FAMILY MEDICINE

## 2019-12-09 PROCEDURE — 36415 COLL VENOUS BLD VENIPUNCTURE: CPT

## 2019-12-09 PROCEDURE — 2580000003 HC RX 258: Performed by: FAMILY MEDICINE

## 2019-12-09 PROCEDURE — 6370000000 HC RX 637 (ALT 250 FOR IP): Performed by: INTERNAL MEDICINE

## 2019-12-09 PROCEDURE — 82306 VITAMIN D 25 HYDROXY: CPT

## 2019-12-09 PROCEDURE — 2060000000 HC ICU INTERMEDIATE R&B

## 2019-12-09 PROCEDURE — 83735 ASSAY OF MAGNESIUM: CPT

## 2019-12-09 PROCEDURE — 2500000003 HC RX 250 WO HCPCS: Performed by: INTERNAL MEDICINE

## 2019-12-09 PROCEDURE — 6370000000 HC RX 637 (ALT 250 FOR IP): Performed by: NEUROMUSCULOSKELETAL MEDICINE & OMM

## 2019-12-09 PROCEDURE — 80048 BASIC METABOLIC PNL TOTAL CA: CPT

## 2019-12-09 PROCEDURE — 82330 ASSAY OF CALCIUM: CPT

## 2019-12-09 PROCEDURE — 83970 ASSAY OF PARATHORMONE: CPT

## 2019-12-09 PROCEDURE — 85025 COMPLETE CBC W/AUTO DIFF WBC: CPT

## 2019-12-09 PROCEDURE — 84132 ASSAY OF SERUM POTASSIUM: CPT

## 2019-12-09 PROCEDURE — 2580000003 HC RX 258: Performed by: NEUROMUSCULOSKELETAL MEDICINE & OMM

## 2019-12-09 PROCEDURE — 84100 ASSAY OF PHOSPHORUS: CPT

## 2019-12-09 RX ORDER — 0.9 % SODIUM CHLORIDE 0.9 %
1000 INTRAVENOUS SOLUTION INTRAVENOUS ONCE
Status: COMPLETED | OUTPATIENT
Start: 2019-12-09 | End: 2019-12-09

## 2019-12-09 RX ORDER — MAGNESIUM SULFATE IN WATER 40 MG/ML
2 INJECTION, SOLUTION INTRAVENOUS ONCE
Status: COMPLETED | OUTPATIENT
Start: 2019-12-09 | End: 2019-12-09

## 2019-12-09 RX ORDER — POTASSIUM CHLORIDE 7.45 MG/ML
10 INJECTION INTRAVENOUS
Status: COMPLETED | OUTPATIENT
Start: 2019-12-09 | End: 2019-12-09

## 2019-12-09 RX ADMIN — Medication 1 TABLET: at 08:38

## 2019-12-09 RX ADMIN — APIXABAN 5 MG: 5 TABLET, FILM COATED ORAL at 08:38

## 2019-12-09 RX ADMIN — APIXABAN 5 MG: 5 TABLET, FILM COATED ORAL at 20:16

## 2019-12-09 RX ADMIN — SODIUM CHLORIDE 1000 ML: 9 INJECTION, SOLUTION INTRAVENOUS at 07:28

## 2019-12-09 RX ADMIN — MAGNESIUM SULFATE HEPTAHYDRATE 2 G: 40 INJECTION, SOLUTION INTRAVENOUS at 12:58

## 2019-12-09 RX ADMIN — SODIUM CHLORIDE: 9 INJECTION, SOLUTION INTRAVENOUS at 16:57

## 2019-12-09 RX ADMIN — POTASSIUM CHLORIDE 10 MEQ: 7.46 INJECTION, SOLUTION INTRAVENOUS at 18:02

## 2019-12-09 RX ADMIN — LINEZOLID 600 MG: 600 INJECTION, SOLUTION INTRAVENOUS at 23:25

## 2019-12-09 RX ADMIN — METOPROLOL TARTRATE 25 MG: 25 TABLET ORAL at 20:16

## 2019-12-09 RX ADMIN — SODIUM CHLORIDE: 9 INJECTION, SOLUTION INTRAVENOUS at 01:46

## 2019-12-09 RX ADMIN — LINEZOLID 600 MG: 600 INJECTION, SOLUTION INTRAVENOUS at 11:55

## 2019-12-09 RX ADMIN — ACETAMINOPHEN 650 MG: 325 TABLET ORAL at 19:40

## 2019-12-09 RX ADMIN — POTASSIUM BICARBONATE 40 MEQ: 782 TABLET, EFFERVESCENT ORAL at 16:53

## 2019-12-09 RX ADMIN — CLINDAMYCIN IN 5 PERCENT DEXTROSE 900 MG: 18 INJECTION, SOLUTION INTRAVENOUS at 20:16

## 2019-12-09 RX ADMIN — ACETAMINOPHEN 650 MG: 325 TABLET ORAL at 08:38

## 2019-12-09 RX ADMIN — POTASSIUM CHLORIDE 10 MEQ: 7.46 INJECTION, SOLUTION INTRAVENOUS at 16:54

## 2019-12-09 RX ADMIN — CLINDAMYCIN IN 5 PERCENT DEXTROSE 900 MG: 18 INJECTION, SOLUTION INTRAVENOUS at 14:43

## 2019-12-09 RX ADMIN — Medication 10 ML: at 20:16

## 2019-12-09 RX ADMIN — CLINDAMYCIN IN 5 PERCENT DEXTROSE 900 MG: 18 INJECTION, SOLUTION INTRAVENOUS at 07:14

## 2019-12-09 ASSESSMENT — PAIN DESCRIPTION - DESCRIPTORS: DESCRIPTORS: HEADACHE

## 2019-12-09 ASSESSMENT — PAIN DESCRIPTION - PAIN TYPE: TYPE: ACUTE PAIN

## 2019-12-09 ASSESSMENT — PAIN SCALES - GENERAL
PAINLEVEL_OUTOF10: 6
PAINLEVEL_OUTOF10: 5
PAINLEVEL_OUTOF10: 0

## 2019-12-09 ASSESSMENT — PAIN DESCRIPTION - LOCATION: LOCATION: HEAD

## 2019-12-10 ENCOUNTER — APPOINTMENT (OUTPATIENT)
Dept: ULTRASOUND IMAGING | Age: 64
DRG: 603 | End: 2019-12-10
Payer: MEDICARE

## 2019-12-10 LAB
ANION GAP SERPL CALCULATED.3IONS-SCNC: 11 MMOL/L (ref 7–16)
BASOPHILS ABSOLUTE: 0.03 E9/L (ref 0–0.2)
BASOPHILS RELATIVE PERCENT: 0.2 % (ref 0–2)
BUN BLDV-MCNC: 15 MG/DL (ref 8–23)
CALCIUM SERPL-MCNC: 8.9 MG/DL (ref 8.6–10.2)
CHLORIDE BLD-SCNC: 93 MMOL/L (ref 98–107)
CO2: 27 MMOL/L (ref 22–29)
CREAT SERPL-MCNC: 1.1 MG/DL (ref 0.7–1.2)
EOSINOPHILS ABSOLUTE: 0.16 E9/L (ref 0.05–0.5)
EOSINOPHILS RELATIVE PERCENT: 1.1 % (ref 0–6)
GFR AFRICAN AMERICAN: >60
GFR NON-AFRICAN AMERICAN: >60 ML/MIN/1.73
GLUCOSE BLD-MCNC: 136 MG/DL (ref 74–99)
HCT VFR BLD CALC: 35.3 % (ref 37–54)
HEMOGLOBIN: 12.3 G/DL (ref 12.5–16.5)
IMMATURE GRANULOCYTES #: 0.15 E9/L
IMMATURE GRANULOCYTES %: 1.1 % (ref 0–5)
LYMPHOCYTES ABSOLUTE: 1.71 E9/L (ref 1.5–4)
LYMPHOCYTES RELATIVE PERCENT: 12 % (ref 20–42)
MAGNESIUM: 2.1 MG/DL (ref 1.6–2.6)
MCH RBC QN AUTO: 32.3 PG (ref 26–35)
MCHC RBC AUTO-ENTMCNC: 34.8 % (ref 32–34.5)
MCV RBC AUTO: 92.7 FL (ref 80–99.9)
MONOCYTES ABSOLUTE: 1.05 E9/L (ref 0.1–0.95)
MONOCYTES RELATIVE PERCENT: 7.4 % (ref 2–12)
NEUTROPHILS ABSOLUTE: 11.18 E9/L (ref 1.8–7.3)
NEUTROPHILS RELATIVE PERCENT: 78.2 % (ref 43–80)
PDW BLD-RTO: 13.6 FL (ref 11.5–15)
PHOSPHORUS: 2.5 MG/DL (ref 2.5–4.5)
PLATELET # BLD: 235 E9/L (ref 130–450)
PMV BLD AUTO: 9.6 FL (ref 7–12)
POTASSIUM SERPL-SCNC: 3.4 MMOL/L (ref 3.5–5)
RBC # BLD: 3.81 E12/L (ref 3.8–5.8)
SODIUM BLD-SCNC: 131 MMOL/L (ref 132–146)
WBC # BLD: 14.3 E9/L (ref 4.5–11.5)

## 2019-12-10 PROCEDURE — 2580000003 HC RX 258: Performed by: NEUROMUSCULOSKELETAL MEDICINE & OMM

## 2019-12-10 PROCEDURE — 85025 COMPLETE CBC W/AUTO DIFF WBC: CPT

## 2019-12-10 PROCEDURE — 84100 ASSAY OF PHOSPHORUS: CPT

## 2019-12-10 PROCEDURE — 6360000002 HC RX W HCPCS: Performed by: INTERNAL MEDICINE

## 2019-12-10 PROCEDURE — 83735 ASSAY OF MAGNESIUM: CPT

## 2019-12-10 PROCEDURE — 2060000000 HC ICU INTERMEDIATE R&B

## 2019-12-10 PROCEDURE — 36415 COLL VENOUS BLD VENIPUNCTURE: CPT

## 2019-12-10 PROCEDURE — 2500000003 HC RX 250 WO HCPCS: Performed by: INTERNAL MEDICINE

## 2019-12-10 PROCEDURE — 80048 BASIC METABOLIC PNL TOTAL CA: CPT

## 2019-12-10 PROCEDURE — 6370000000 HC RX 637 (ALT 250 FOR IP): Performed by: NEUROMUSCULOSKELETAL MEDICINE & OMM

## 2019-12-10 PROCEDURE — 76999 ECHO EXAMINATION PROCEDURE: CPT

## 2019-12-10 PROCEDURE — 6370000000 HC RX 637 (ALT 250 FOR IP): Performed by: INTERNAL MEDICINE

## 2019-12-10 RX ORDER — VITAMIN B COMPLEX
2000 TABLET ORAL DAILY
Status: DISCONTINUED | OUTPATIENT
Start: 2019-12-10 | End: 2019-12-13 | Stop reason: HOSPADM

## 2019-12-10 RX ADMIN — Medication 10 ML: at 09:45

## 2019-12-10 RX ADMIN — CLINDAMYCIN IN 5 PERCENT DEXTROSE 900 MG: 18 INJECTION, SOLUTION INTRAVENOUS at 22:15

## 2019-12-10 RX ADMIN — Medication 1 TABLET: at 09:45

## 2019-12-10 RX ADMIN — METOPROLOL TARTRATE 25 MG: 25 TABLET ORAL at 20:17

## 2019-12-10 RX ADMIN — LINEZOLID 600 MG: 600 INJECTION, SOLUTION INTRAVENOUS at 23:18

## 2019-12-10 RX ADMIN — METOPROLOL TARTRATE 25 MG: 25 TABLET ORAL at 09:45

## 2019-12-10 RX ADMIN — ACETAMINOPHEN 650 MG: 325 TABLET ORAL at 14:29

## 2019-12-10 RX ADMIN — CLINDAMYCIN IN 5 PERCENT DEXTROSE 900 MG: 18 INJECTION, SOLUTION INTRAVENOUS at 14:09

## 2019-12-10 RX ADMIN — Medication 10 ML: at 20:17

## 2019-12-10 RX ADMIN — POTASSIUM BICARBONATE 40 MEQ: 782 TABLET, EFFERVESCENT ORAL at 14:08

## 2019-12-10 RX ADMIN — CLINDAMYCIN IN 5 PERCENT DEXTROSE 900 MG: 18 INJECTION, SOLUTION INTRAVENOUS at 06:38

## 2019-12-10 RX ADMIN — VITAMIN D, TAB 1000IU (100/BT) 2000 UNITS: 25 TAB at 14:08

## 2019-12-10 RX ADMIN — LINEZOLID 600 MG: 600 INJECTION, SOLUTION INTRAVENOUS at 11:07

## 2019-12-10 RX ADMIN — ACETAMINOPHEN 650 MG: 325 TABLET ORAL at 22:15

## 2019-12-10 ASSESSMENT — PAIN SCALES - GENERAL
PAINLEVEL_OUTOF10: 2
PAINLEVEL_OUTOF10: 0
PAINLEVEL_OUTOF10: 3
PAINLEVEL_OUTOF10: 0
PAINLEVEL_OUTOF10: 4
PAINLEVEL_OUTOF10: 0

## 2019-12-10 ASSESSMENT — PAIN DESCRIPTION - PAIN TYPE: TYPE: ACUTE PAIN

## 2019-12-10 ASSESSMENT — PAIN DESCRIPTION - DESCRIPTORS: DESCRIPTORS: ACHING;BURNING;SORE

## 2019-12-10 ASSESSMENT — PAIN DESCRIPTION - LOCATION: LOCATION: BREAST

## 2019-12-10 ASSESSMENT — PAIN DESCRIPTION - ORIENTATION: ORIENTATION: LEFT

## 2019-12-11 LAB
ANION GAP SERPL CALCULATED.3IONS-SCNC: 11 MMOL/L (ref 7–16)
BASOPHILS ABSOLUTE: 0.04 E9/L (ref 0–0.2)
BASOPHILS RELATIVE PERCENT: 0.4 % (ref 0–2)
BUN BLDV-MCNC: 15 MG/DL (ref 8–23)
CALCIUM SERPL-MCNC: 9.1 MG/DL (ref 8.6–10.2)
CHLORIDE BLD-SCNC: 91 MMOL/L (ref 98–107)
CO2: 30 MMOL/L (ref 22–29)
CREAT SERPL-MCNC: 1.4 MG/DL (ref 0.7–1.2)
EOSINOPHILS ABSOLUTE: 0.16 E9/L (ref 0.05–0.5)
EOSINOPHILS RELATIVE PERCENT: 1.4 % (ref 0–6)
GFR AFRICAN AMERICAN: >60
GFR NON-AFRICAN AMERICAN: 51 ML/MIN/1.73
GLUCOSE BLD-MCNC: 115 MG/DL (ref 74–99)
HCT VFR BLD CALC: 36.4 % (ref 37–54)
HEMOGLOBIN: 12.2 G/DL (ref 12.5–16.5)
IMMATURE GRANULOCYTES #: 0.19 E9/L
IMMATURE GRANULOCYTES %: 1.7 % (ref 0–5)
LYMPHOCYTES ABSOLUTE: 1.54 E9/L (ref 1.5–4)
LYMPHOCYTES RELATIVE PERCENT: 13.6 % (ref 20–42)
MAGNESIUM: 2 MG/DL (ref 1.6–2.6)
MCH RBC QN AUTO: 31.5 PG (ref 26–35)
MCHC RBC AUTO-ENTMCNC: 33.5 % (ref 32–34.5)
MCV RBC AUTO: 94.1 FL (ref 80–99.9)
MONOCYTES ABSOLUTE: 1.19 E9/L (ref 0.1–0.95)
MONOCYTES RELATIVE PERCENT: 10.5 % (ref 2–12)
NEUTROPHILS ABSOLUTE: 8.2 E9/L (ref 1.8–7.3)
NEUTROPHILS RELATIVE PERCENT: 72.4 % (ref 43–80)
PDW BLD-RTO: 13.5 FL (ref 11.5–15)
PHOSPHORUS: 3 MG/DL (ref 2.5–4.5)
PLATELET # BLD: 251 E9/L (ref 130–450)
PMV BLD AUTO: 9.6 FL (ref 7–12)
POTASSIUM SERPL-SCNC: 3.1 MMOL/L (ref 3.5–5)
RBC # BLD: 3.87 E12/L (ref 3.8–5.8)
SODIUM BLD-SCNC: 132 MMOL/L (ref 132–146)
WBC # BLD: 11.3 E9/L (ref 4.5–11.5)

## 2019-12-11 PROCEDURE — 2580000003 HC RX 258: Performed by: NEUROMUSCULOSKELETAL MEDICINE & OMM

## 2019-12-11 PROCEDURE — 0H9CXZZ DRAINAGE OF LEFT UPPER ARM SKIN, EXTERNAL APPROACH: ICD-10-PCS | Performed by: INTERNAL MEDICINE

## 2019-12-11 PROCEDURE — 84100 ASSAY OF PHOSPHORUS: CPT

## 2019-12-11 PROCEDURE — 2500000003 HC RX 250 WO HCPCS: Performed by: INTERNAL MEDICINE

## 2019-12-11 PROCEDURE — 2500000003 HC RX 250 WO HCPCS: Performed by: SURGERY

## 2019-12-11 PROCEDURE — 6360000002 HC RX W HCPCS

## 2019-12-11 PROCEDURE — 87070 CULTURE OTHR SPECIMN AEROBIC: CPT

## 2019-12-11 PROCEDURE — 6370000000 HC RX 637 (ALT 250 FOR IP): Performed by: INTERNAL MEDICINE

## 2019-12-11 PROCEDURE — 83735 ASSAY OF MAGNESIUM: CPT

## 2019-12-11 PROCEDURE — 80048 BASIC METABOLIC PNL TOTAL CA: CPT

## 2019-12-11 PROCEDURE — 36415 COLL VENOUS BLD VENIPUNCTURE: CPT

## 2019-12-11 PROCEDURE — 6360000002 HC RX W HCPCS: Performed by: INTERNAL MEDICINE

## 2019-12-11 PROCEDURE — 87075 CULTR BACTERIA EXCEPT BLOOD: CPT

## 2019-12-11 PROCEDURE — 6370000000 HC RX 637 (ALT 250 FOR IP): Performed by: NEUROMUSCULOSKELETAL MEDICINE & OMM

## 2019-12-11 PROCEDURE — 85025 COMPLETE CBC W/AUTO DIFF WBC: CPT

## 2019-12-11 PROCEDURE — 87186 SC STD MICRODIL/AGAR DIL: CPT

## 2019-12-11 PROCEDURE — 94640 AIRWAY INHALATION TREATMENT: CPT

## 2019-12-11 PROCEDURE — 87205 SMEAR GRAM STAIN: CPT

## 2019-12-11 PROCEDURE — 2060000000 HC ICU INTERMEDIATE R&B

## 2019-12-11 RX ORDER — MORPHINE SULFATE 2 MG/ML
2 INJECTION, SOLUTION INTRAMUSCULAR; INTRAVENOUS ONCE
Status: COMPLETED | OUTPATIENT
Start: 2019-12-11 | End: 2019-12-11

## 2019-12-11 RX ORDER — LIDOCAINE HYDROCHLORIDE AND EPINEPHRINE 10; 10 MG/ML; UG/ML
20 INJECTION, SOLUTION INFILTRATION; PERINEURAL ONCE
Status: COMPLETED | OUTPATIENT
Start: 2019-12-11 | End: 2019-12-11

## 2019-12-11 RX ORDER — MORPHINE SULFATE 2 MG/ML
INJECTION, SOLUTION INTRAMUSCULAR; INTRAVENOUS
Status: COMPLETED
Start: 2019-12-11 | End: 2019-12-11

## 2019-12-11 RX ADMIN — Medication 10 ML: at 20:12

## 2019-12-11 RX ADMIN — LINEZOLID 600 MG: 600 INJECTION, SOLUTION INTRAVENOUS at 13:10

## 2019-12-11 RX ADMIN — METOPROLOL TARTRATE 25 MG: 25 TABLET ORAL at 20:12

## 2019-12-11 RX ADMIN — Medication 10 ML: at 10:26

## 2019-12-11 RX ADMIN — Medication 10 ML: at 12:55

## 2019-12-11 RX ADMIN — Medication 1 TABLET: at 10:25

## 2019-12-11 RX ADMIN — VITAMIN D, TAB 1000IU (100/BT) 2000 UNITS: 25 TAB at 10:25

## 2019-12-11 RX ADMIN — ACETAMINOPHEN 650 MG: 325 TABLET ORAL at 20:12

## 2019-12-11 RX ADMIN — CLINDAMYCIN IN 5 PERCENT DEXTROSE 900 MG: 18 INJECTION, SOLUTION INTRAVENOUS at 05:52

## 2019-12-11 RX ADMIN — MORPHINE SULFATE 2 MG: 2 INJECTION, SOLUTION INTRAMUSCULAR; INTRAVENOUS at 14:12

## 2019-12-11 RX ADMIN — POTASSIUM BICARBONATE 40 MEQ: 782 TABLET, EFFERVESCENT ORAL at 13:06

## 2019-12-11 RX ADMIN — CLINDAMYCIN IN 5 PERCENT DEXTROSE 900 MG: 18 INJECTION, SOLUTION INTRAVENOUS at 22:45

## 2019-12-11 RX ADMIN — LINEZOLID 600 MG: 600 INJECTION, SOLUTION INTRAVENOUS at 23:29

## 2019-12-11 RX ADMIN — LIDOCAINE HYDROCHLORIDE,EPINEPHRINE BITARTRATE 20 ML: 10; .01 INJECTION, SOLUTION INFILTRATION; PERINEURAL at 14:10

## 2019-12-11 ASSESSMENT — PAIN SCALES - GENERAL
PAINLEVEL_OUTOF10: 0
PAINLEVEL_OUTOF10: 10
PAINLEVEL_OUTOF10: 0
PAINLEVEL_OUTOF10: 4
PAINLEVEL_OUTOF10: 0
PAINLEVEL_OUTOF10: 0
PAINLEVEL_OUTOF10: 10

## 2019-12-11 ASSESSMENT — PAIN DESCRIPTION - ORIENTATION
ORIENTATION: LEFT
ORIENTATION: LEFT

## 2019-12-11 ASSESSMENT — PAIN DESCRIPTION - DESCRIPTORS
DESCRIPTORS: SHARP;SHOOTING
DESCRIPTORS: ACHING;BURNING;DISCOMFORT

## 2019-12-11 ASSESSMENT — PAIN DESCRIPTION - FREQUENCY: FREQUENCY: CONTINUOUS

## 2019-12-11 ASSESSMENT — PAIN DESCRIPTION - PAIN TYPE
TYPE: ACUTE PAIN;SURGICAL PAIN
TYPE: SURGICAL PAIN;ACUTE PAIN

## 2019-12-11 ASSESSMENT — PAIN DESCRIPTION - LOCATION
LOCATION: OTHER (COMMENT);BREAST
LOCATION: ARM;OTHER (COMMENT)

## 2019-12-11 ASSESSMENT — PAIN DESCRIPTION - ONSET: ONSET: ON-GOING

## 2019-12-11 ASSESSMENT — PAIN DESCRIPTION - PROGRESSION: CLINICAL_PROGRESSION: GRADUALLY WORSENING

## 2019-12-11 ASSESSMENT — PAIN - FUNCTIONAL ASSESSMENT: PAIN_FUNCTIONAL_ASSESSMENT: PREVENTS OR INTERFERES SOME ACTIVE ACTIVITIES AND ADLS

## 2019-12-12 LAB
ANION GAP SERPL CALCULATED.3IONS-SCNC: 14 MMOL/L (ref 7–16)
ANISOCYTOSIS: ABNORMAL
BASOPHILS ABSOLUTE: 0.09 E9/L (ref 0–0.2)
BASOPHILS RELATIVE PERCENT: 0.6 % (ref 0–2)
BLOOD CULTURE, ROUTINE: NORMAL
BUN BLDV-MCNC: 11 MG/DL (ref 8–23)
CALCIUM SERPL-MCNC: 9.1 MG/DL (ref 8.6–10.2)
CHLORIDE BLD-SCNC: 92 MMOL/L (ref 98–107)
CO2: 27 MMOL/L (ref 22–29)
CREAT SERPL-MCNC: 1 MG/DL (ref 0.7–1.2)
CULTURE, BLOOD 2: NORMAL
EOSINOPHILS ABSOLUTE: 0.25 E9/L (ref 0.05–0.5)
EOSINOPHILS RELATIVE PERCENT: 1.7 % (ref 0–6)
GFR AFRICAN AMERICAN: >60
GFR NON-AFRICAN AMERICAN: >60 ML/MIN/1.73
GLUCOSE BLD-MCNC: 110 MG/DL (ref 74–99)
HCT VFR BLD CALC: 37.4 % (ref 37–54)
HEMOGLOBIN: 13 G/DL (ref 12.5–16.5)
IMMATURE GRANULOCYTES #: 0.68 E9/L
IMMATURE GRANULOCYTES %: 4.8 % (ref 0–5)
LYMPHOCYTES ABSOLUTE: 2.27 E9/L (ref 1.5–4)
LYMPHOCYTES RELATIVE PERCENT: 15.9 % (ref 20–42)
MAGNESIUM: 2 MG/DL (ref 1.6–2.6)
MCH RBC QN AUTO: 32.5 PG (ref 26–35)
MCHC RBC AUTO-ENTMCNC: 34.8 % (ref 32–34.5)
MCV RBC AUTO: 93.5 FL (ref 80–99.9)
MONOCYTES ABSOLUTE: 2.04 E9/L (ref 0.1–0.95)
MONOCYTES RELATIVE PERCENT: 14.3 % (ref 2–12)
NEUTROPHILS ABSOLUTE: 8.97 E9/L (ref 1.8–7.3)
NEUTROPHILS RELATIVE PERCENT: 62.7 % (ref 43–80)
PDW BLD-RTO: 13.7 FL (ref 11.5–15)
PHOSPHORUS: 3.5 MG/DL (ref 2.5–4.5)
PLATELET # BLD: 268 E9/L (ref 130–450)
PMV BLD AUTO: 9.4 FL (ref 7–12)
POTASSIUM SERPL-SCNC: 3.3 MMOL/L (ref 3.5–5)
RBC # BLD: 4 E12/L (ref 3.8–5.8)
SODIUM BLD-SCNC: 133 MMOL/L (ref 132–146)
WBC # BLD: 14.3 E9/L (ref 4.5–11.5)

## 2019-12-12 PROCEDURE — 36415 COLL VENOUS BLD VENIPUNCTURE: CPT

## 2019-12-12 PROCEDURE — 2060000000 HC ICU INTERMEDIATE R&B

## 2019-12-12 PROCEDURE — 80048 BASIC METABOLIC PNL TOTAL CA: CPT

## 2019-12-12 PROCEDURE — 6360000002 HC RX W HCPCS: Performed by: INTERNAL MEDICINE

## 2019-12-12 PROCEDURE — 85025 COMPLETE CBC W/AUTO DIFF WBC: CPT

## 2019-12-12 PROCEDURE — 2580000003 HC RX 258: Performed by: NEUROMUSCULOSKELETAL MEDICINE & OMM

## 2019-12-12 PROCEDURE — 2500000003 HC RX 250 WO HCPCS: Performed by: INTERNAL MEDICINE

## 2019-12-12 PROCEDURE — 83735 ASSAY OF MAGNESIUM: CPT

## 2019-12-12 PROCEDURE — 94640 AIRWAY INHALATION TREATMENT: CPT

## 2019-12-12 PROCEDURE — 6370000000 HC RX 637 (ALT 250 FOR IP): Performed by: INTERNAL MEDICINE

## 2019-12-12 PROCEDURE — 6370000000 HC RX 637 (ALT 250 FOR IP): Performed by: NEUROMUSCULOSKELETAL MEDICINE & OMM

## 2019-12-12 PROCEDURE — 84100 ASSAY OF PHOSPHORUS: CPT

## 2019-12-12 RX ORDER — POTASSIUM CHLORIDE 20 MEQ/1
40 TABLET, EXTENDED RELEASE ORAL ONCE
Status: COMPLETED | OUTPATIENT
Start: 2019-12-12 | End: 2019-12-12

## 2019-12-12 RX ADMIN — CLINDAMYCIN IN 5 PERCENT DEXTROSE 900 MG: 18 INJECTION, SOLUTION INTRAVENOUS at 05:53

## 2019-12-12 RX ADMIN — METOPROLOL TARTRATE 25 MG: 25 TABLET ORAL at 09:27

## 2019-12-12 RX ADMIN — VITAMIN D, TAB 1000IU (100/BT) 2000 UNITS: 25 TAB at 09:26

## 2019-12-12 RX ADMIN — LINEZOLID 600 MG: 600 INJECTION, SOLUTION INTRAVENOUS at 22:49

## 2019-12-12 RX ADMIN — METOPROLOL TARTRATE 25 MG: 25 TABLET ORAL at 21:41

## 2019-12-12 RX ADMIN — Medication 10 ML: at 21:41

## 2019-12-12 RX ADMIN — Medication 10 ML: at 09:27

## 2019-12-12 RX ADMIN — Medication 1 TABLET: at 09:26

## 2019-12-12 RX ADMIN — CLINDAMYCIN IN 5 PERCENT DEXTROSE 900 MG: 18 INJECTION, SOLUTION INTRAVENOUS at 21:41

## 2019-12-12 RX ADMIN — LINEZOLID 600 MG: 600 INJECTION, SOLUTION INTRAVENOUS at 12:00

## 2019-12-12 RX ADMIN — POTASSIUM CHLORIDE 40 MEQ: 20 TABLET, EXTENDED RELEASE ORAL at 11:59

## 2019-12-12 RX ADMIN — CLINDAMYCIN IN 5 PERCENT DEXTROSE 900 MG: 18 INJECTION, SOLUTION INTRAVENOUS at 14:11

## 2019-12-12 ASSESSMENT — PAIN SCALES - GENERAL
PAINLEVEL_OUTOF10: 0
PAINLEVEL_OUTOF10: 0
PAINLEVEL_OUTOF10: 5

## 2019-12-12 ASSESSMENT — PAIN DESCRIPTION - DESCRIPTORS: DESCRIPTORS: DISCOMFORT;ACHING

## 2019-12-12 ASSESSMENT — PAIN DESCRIPTION - LOCATION: LOCATION: OTHER (COMMENT)

## 2019-12-12 ASSESSMENT — PAIN DESCRIPTION - ORIENTATION: ORIENTATION: LEFT

## 2019-12-12 ASSESSMENT — PAIN DESCRIPTION - PAIN TYPE: TYPE: ACUTE PAIN;SURGICAL PAIN

## 2019-12-13 VITALS
BODY MASS INDEX: 40.43 KG/M2 | HEART RATE: 93 BPM | DIASTOLIC BLOOD PRESSURE: 93 MMHG | WEIGHT: 315 LBS | TEMPERATURE: 97.5 F | RESPIRATION RATE: 18 BRPM | HEIGHT: 74 IN | OXYGEN SATURATION: 94 % | SYSTOLIC BLOOD PRESSURE: 126 MMHG

## 2019-12-13 LAB
ANAEROBIC CULTURE: NORMAL
ANION GAP SERPL CALCULATED.3IONS-SCNC: 11 MMOL/L (ref 7–16)
ANISOCYTOSIS: ABNORMAL
BASOPHILS ABSOLUTE: 0 E9/L (ref 0–0.2)
BASOPHILS RELATIVE PERCENT: 0 % (ref 0–2)
BUN BLDV-MCNC: 12 MG/DL (ref 8–23)
CALCIUM SERPL-MCNC: 8.9 MG/DL (ref 8.6–10.2)
CHLORIDE BLD-SCNC: 91 MMOL/L (ref 98–107)
CO2: 29 MMOL/L (ref 22–29)
CREAT SERPL-MCNC: 1.1 MG/DL (ref 0.7–1.2)
EOSINOPHILS ABSOLUTE: 0.39 E9/L (ref 0.05–0.5)
EOSINOPHILS RELATIVE PERCENT: 2.6 % (ref 0–6)
GFR AFRICAN AMERICAN: >60
GFR NON-AFRICAN AMERICAN: >60 ML/MIN/1.73
GLUCOSE BLD-MCNC: 100 MG/DL (ref 74–99)
GRAM STAIN RESULT: ABNORMAL
HCT VFR BLD CALC: 38.3 % (ref 37–54)
HEMOGLOBIN: 13.1 G/DL (ref 12.5–16.5)
LYMPHOCYTES ABSOLUTE: 2.83 E9/L (ref 1.5–4)
LYMPHOCYTES RELATIVE PERCENT: 19.1 % (ref 20–42)
MAGNESIUM: 1.9 MG/DL (ref 1.6–2.6)
MCH RBC QN AUTO: 32.1 PG (ref 26–35)
MCHC RBC AUTO-ENTMCNC: 34.2 % (ref 32–34.5)
MCV RBC AUTO: 93.9 FL (ref 80–99.9)
METAMYELOCYTES RELATIVE PERCENT: 0.9 % (ref 0–1)
MONOCYTES ABSOLUTE: 0.74 E9/L (ref 0.1–0.95)
MONOCYTES RELATIVE PERCENT: 5.2 % (ref 2–12)
NEUTROPHILS ABSOLUTE: 10.73 E9/L (ref 1.8–7.3)
NEUTROPHILS RELATIVE PERCENT: 71.3 % (ref 43–80)
NUCLEATED RED BLOOD CELLS: 0 /100 WBC
ORGANISM: ABNORMAL
PDW BLD-RTO: 13.8 FL (ref 11.5–15)
PHOSPHORUS: 3.1 MG/DL (ref 2.5–4.5)
PLATELET # BLD: 294 E9/L (ref 130–450)
PMV BLD AUTO: 9.2 FL (ref 7–12)
POTASSIUM SERPL-SCNC: 3.2 MMOL/L (ref 3.5–5)
PROMYELOCYTES PERCENT: 0.9 % (ref 0–0)
RBC # BLD: 4.08 E12/L (ref 3.8–5.8)
SODIUM BLD-SCNC: 131 MMOL/L (ref 132–146)
WBC # BLD: 14.9 E9/L (ref 4.5–11.5)
WOUND/ABSCESS: ABNORMAL

## 2019-12-13 PROCEDURE — 83735 ASSAY OF MAGNESIUM: CPT

## 2019-12-13 PROCEDURE — 94640 AIRWAY INHALATION TREATMENT: CPT

## 2019-12-13 PROCEDURE — 6370000000 HC RX 637 (ALT 250 FOR IP): Performed by: INTERNAL MEDICINE

## 2019-12-13 PROCEDURE — 84100 ASSAY OF PHOSPHORUS: CPT

## 2019-12-13 PROCEDURE — 6370000000 HC RX 637 (ALT 250 FOR IP): Performed by: FAMILY MEDICINE

## 2019-12-13 PROCEDURE — 6370000000 HC RX 637 (ALT 250 FOR IP): Performed by: NEUROMUSCULOSKELETAL MEDICINE & OMM

## 2019-12-13 PROCEDURE — 85025 COMPLETE CBC W/AUTO DIFF WBC: CPT

## 2019-12-13 PROCEDURE — 2500000003 HC RX 250 WO HCPCS: Performed by: INTERNAL MEDICINE

## 2019-12-13 PROCEDURE — 2580000003 HC RX 258: Performed by: NEUROMUSCULOSKELETAL MEDICINE & OMM

## 2019-12-13 PROCEDURE — 80048 BASIC METABOLIC PNL TOTAL CA: CPT

## 2019-12-13 PROCEDURE — 36415 COLL VENOUS BLD VENIPUNCTURE: CPT

## 2019-12-13 PROCEDURE — 6360000002 HC RX W HCPCS: Performed by: INTERNAL MEDICINE

## 2019-12-13 RX ORDER — POTASSIUM CHLORIDE 20 MEQ/1
40 TABLET, EXTENDED RELEASE ORAL ONCE
Status: COMPLETED | OUTPATIENT
Start: 2019-12-13 | End: 2019-12-13

## 2019-12-13 RX ORDER — LINEZOLID 600 MG/1
600 TABLET, FILM COATED ORAL 2 TIMES DAILY
Qty: 20 TABLET | Refills: 0 | Status: SHIPPED | OUTPATIENT
Start: 2019-12-13 | End: 2019-12-23

## 2019-12-13 RX ORDER — LINEZOLID 600 MG/1
600 TABLET, FILM COATED ORAL ONCE
Status: DISCONTINUED | OUTPATIENT
Start: 2019-12-13 | End: 2019-12-13 | Stop reason: HOSPADM

## 2019-12-13 RX ORDER — LINEZOLID 600 MG/1
600 TABLET, FILM COATED ORAL EVERY 12 HOURS SCHEDULED
Status: DISCONTINUED | OUTPATIENT
Start: 2019-12-14 | End: 2019-12-13 | Stop reason: HOSPADM

## 2019-12-13 RX ADMIN — LINEZOLID 600 MG: 600 INJECTION, SOLUTION INTRAVENOUS at 11:42

## 2019-12-13 RX ADMIN — Medication 1 TABLET: at 09:37

## 2019-12-13 RX ADMIN — METOPROLOL TARTRATE 25 MG: 25 TABLET ORAL at 09:37

## 2019-12-13 RX ADMIN — METOPROLOL TARTRATE 25 MG: 25 TABLET ORAL at 20:20

## 2019-12-13 RX ADMIN — POTASSIUM CHLORIDE 40 MEQ: 20 TABLET, EXTENDED RELEASE ORAL at 09:36

## 2019-12-13 RX ADMIN — CLINDAMYCIN IN 5 PERCENT DEXTROSE 900 MG: 18 INJECTION, SOLUTION INTRAVENOUS at 06:31

## 2019-12-13 RX ADMIN — LINEZOLID 600 MG: 600 TABLET, FILM COATED ORAL at 20:59

## 2019-12-13 RX ADMIN — ACETAMINOPHEN 650 MG: 325 TABLET ORAL at 09:47

## 2019-12-13 RX ADMIN — Medication 10 ML: at 09:37

## 2019-12-13 RX ADMIN — VITAMIN D, TAB 1000IU (100/BT) 2000 UNITS: 25 TAB at 09:39

## 2019-12-13 RX ADMIN — CLINDAMYCIN IN 5 PERCENT DEXTROSE 900 MG: 18 INJECTION, SOLUTION INTRAVENOUS at 13:45

## 2019-12-13 ASSESSMENT — PAIN DESCRIPTION - PROGRESSION: CLINICAL_PROGRESSION: GRADUALLY WORSENING

## 2019-12-13 ASSESSMENT — PAIN DESCRIPTION - PAIN TYPE: TYPE: ACUTE PAIN

## 2019-12-13 ASSESSMENT — PAIN DESCRIPTION - FREQUENCY: FREQUENCY: CONTINUOUS

## 2019-12-13 ASSESSMENT — PAIN SCALES - GENERAL
PAINLEVEL_OUTOF10: 0
PAINLEVEL_OUTOF10: 4

## 2019-12-13 ASSESSMENT — PAIN DESCRIPTION - DESCRIPTORS: DESCRIPTORS: ACHING

## 2019-12-13 ASSESSMENT — PAIN DESCRIPTION - LOCATION: LOCATION: ARM

## 2019-12-13 ASSESSMENT — PAIN SCALES - WONG BAKER: WONGBAKER_NUMERICALRESPONSE: 0

## 2019-12-13 ASSESSMENT — PAIN - FUNCTIONAL ASSESSMENT: PAIN_FUNCTIONAL_ASSESSMENT: PREVENTS OR INTERFERES SOME ACTIVE ACTIVITIES AND ADLS

## 2019-12-30 ENCOUNTER — HOSPITAL ENCOUNTER (OUTPATIENT)
Age: 64
Discharge: HOME | End: 2019-12-30
Payer: MEDICARE

## 2019-12-30 LAB
ALBUMIN SERPL-MCNC: 4.4 G/DL (ref 3.5–5.2)
ALP BLD-CCNC: 100 U/L (ref 40–129)
ALT SERPL-CCNC: 16 U/L (ref 0–40)
ANION GAP SERPL CALCULATED.3IONS-SCNC: 9 MMOL/L (ref 7–16)
AST SERPL-CCNC: 13 U/L (ref 0–39)
BILIRUB SERPL-MCNC: 0.2 MG/DL (ref 0–1.2)
BUN BLDV-MCNC: 12 MG/DL (ref 8–23)
CALCIUM SERPL-MCNC: 9.6 MG/DL (ref 8.6–10.2)
CHLORIDE BLD-SCNC: 99 MMOL/L (ref 98–107)
CO2: 31 MMOL/L (ref 22–29)
CREAT SERPL-MCNC: 1.2 MG/DL (ref 0.7–1.2)
GFR AFRICAN AMERICAN: >60
GFR NON-AFRICAN AMERICAN: >60 ML/MIN/1.73
GLUCOSE BLD-MCNC: 101 MG/DL (ref 74–99)
POTASSIUM SERPL-SCNC: 4.4 MMOL/L (ref 3.5–5)
SODIUM BLD-SCNC: 139 MMOL/L (ref 132–146)
TOTAL PROTEIN: 7.1 G/DL (ref 6.4–8.3)

## 2019-12-30 PROCEDURE — 80053 COMPREHEN METABOLIC PANEL: CPT

## 2019-12-30 PROCEDURE — 36415 COLL VENOUS BLD VENIPUNCTURE: CPT

## 2020-01-10 ENCOUNTER — HOSPITAL ENCOUNTER (OUTPATIENT)
Dept: GENERAL RADIOLOGY | Age: 65
Discharge: HOME | End: 2020-01-12
Payer: MEDICARE

## 2020-01-10 ENCOUNTER — HOSPITAL ENCOUNTER (OUTPATIENT)
Age: 65
Discharge: HOME | End: 2020-01-12
Payer: MEDICARE

## 2020-01-10 PROCEDURE — 71046 X-RAY EXAM CHEST 2 VIEWS: CPT

## 2020-01-17 ENCOUNTER — HOSPITAL ENCOUNTER (OUTPATIENT)
Age: 65
Discharge: HOME | End: 2020-01-17
Payer: MEDICARE

## 2020-01-17 LAB
ADENOVIRUS BY PCR: NOT DETECTED
BORDETELLA PARAPERTUSSIS BY PCR: NOT DETECTED
BORDETELLA PERTUSSIS BY PCR: NOT DETECTED
CHLAMYDOPHILIA PNEUMONIAE BY PCR: NOT DETECTED
CORONAVIRUS 229E BY PCR: NOT DETECTED
CORONAVIRUS HKU1 BY PCR: NOT DETECTED
CORONAVIRUS NL63 BY PCR: NOT DETECTED
CORONAVIRUS OC43 BY PCR: NOT DETECTED
HUMAN METAPNEUMOVIRUS BY PCR: NOT DETECTED
HUMAN RHINOVIRUS/ENTEROVIRUS BY PCR: NOT DETECTED
INFLUENZA A BY PCR: NOT DETECTED
INFLUENZA B BY PCR: NOT DETECTED
MYCOPLASMA PNEUMONIAE BY PCR: NOT DETECTED
PARAINFLUENZA VIRUS 1 BY PCR: NOT DETECTED
PARAINFLUENZA VIRUS 2 BY PCR: NOT DETECTED
PARAINFLUENZA VIRUS 3 BY PCR: NOT DETECTED
PARAINFLUENZA VIRUS 4 BY PCR: NOT DETECTED
RESPIRATORY SYNCYTIAL VIRUS BY PCR: NOT DETECTED

## 2020-01-17 PROCEDURE — 0100U HC RESPIRPTHGN MULT REV TRANS & AMP PRB TECH 21 TRGT: CPT

## 2020-01-20 PROBLEM — J18.9 PNEUMONIA: Status: RESOLVED | Noted: 2019-10-19 | Resolved: 2020-01-20

## 2020-01-20 PROBLEM — A41.9 SEPSIS (HCC): Status: RESOLVED | Noted: 2019-12-07 | Resolved: 2020-01-20

## 2020-01-20 PROBLEM — I48.20 CHRONIC ATRIAL FIBRILLATION (HCC): Status: ACTIVE | Noted: 2020-01-20

## 2020-01-20 PROBLEM — E66.813 CLASS 3 SEVERE OBESITY DUE TO EXCESS CALORIES WITH SERIOUS COMORBIDITY AND BODY MASS INDEX (BMI) OF 40.0 TO 44.9 IN ADULT: Status: ACTIVE | Noted: 2020-01-20

## 2020-01-20 PROBLEM — I10 ESSENTIAL HYPERTENSION: Status: ACTIVE | Noted: 2020-01-20

## 2020-01-20 PROBLEM — I48.91 ATRIAL FIBRILLATION WITH RAPID VENTRICULAR RESPONSE (HCC): Status: RESOLVED | Noted: 2019-10-14 | Resolved: 2020-01-20

## 2020-01-20 PROBLEM — J20.8 ACUTE BRONCHITIS DUE TO OTHER SPECIFIED ORGANISMS: Status: RESOLVED | Noted: 2019-04-22 | Resolved: 2020-01-20

## 2020-01-20 PROBLEM — J43.2 CENTRILOBULAR EMPHYSEMA (HCC): Status: ACTIVE | Noted: 2020-01-20

## 2020-01-20 PROBLEM — J18.9 CAP (COMMUNITY ACQUIRED PNEUMONIA): Status: ACTIVE | Noted: 2020-01-20

## 2020-01-20 PROBLEM — J01.90 ACUTE BACTERIAL SINUSITIS: Status: RESOLVED | Noted: 2019-01-14 | Resolved: 2020-01-20

## 2020-01-20 PROBLEM — B96.89 ACUTE BACTERIAL SINUSITIS: Status: RESOLVED | Noted: 2019-01-14 | Resolved: 2020-01-20

## 2020-01-20 PROBLEM — E66.01 CLASS 3 SEVERE OBESITY DUE TO EXCESS CALORIES WITH SERIOUS COMORBIDITY AND BODY MASS INDEX (BMI) OF 40.0 TO 44.9 IN ADULT (HCC): Status: ACTIVE | Noted: 2020-01-20

## 2020-02-03 NOTE — PROGRESS NOTES
in left displaced lateral mallelous and deltoid ligament injury s/p  ORIF L lateral malleolus  14. 7/2/2018 Lumbar laminectomy  15. Hernia repair, uvulectomy 1990's, bilateral cataract curgery  16   hospitalized 10/14/2019 through 10/18/2019 for COPD exacerbation and atrial fibrillation with rapid ventricular response. He was anticoagulated for GHA0BV3-GHJc= 2.  proBNP was only 434 although it was felt that this may have been depressed by obesity. He was diuresed for 4 L.  17      hospitalized 12/07/2019 through 12/13/2019. He had a left chest wall abscess/cellulitis which was incised and drained. He was seen by nephrology for BUN 27, creatinine 1.6 and GFR 24. This was attributed to intravascular volume depletion primarily.   .    Review of Systems:  Constitutional: negative for fever and chills  Respiratory: negative for cough and hemoptysis  Cardiovascular:   Gastrointestinal: negative for abdominal pain, diarrhea, nausea and vomiting  Genitourinary:negative for dysuria and hematuria  Derm: negative for rash and skin lesion(s)  Neurological: negative for seizures and tremors  Endocrine: negative for diabetic symptoms including polydipsia and polyuria  Musculoskeletal: negative for CTD  Psychiatric: negative for psychosis and major depression    On examination, he is an obese plethoric middle-age  man in no distress   skin is warm and dry. Respirations are unlabored. /60   Pulse 72   Resp 18   Ht 6' 2\" (1.88 m)   Wt (!) 328 lb 3.2 oz (148.9 kg)   BMI 42.14 kg/m² . HEENT negative for scleral icterus. Extraocular muscles intact. No facial asymmetry or central cyanosis. Neck without masses or goiter. Neck is thick and short no bruit or JVD. Cardiac apex not displaced. Rhythm regular. Abdomen normal.  Extremities without edema. Breath sounds are distant with poor respiratory excursion.   Scattered wheeze anteriorly and posteriorly    EKG today shows atrial fibrillation with

## 2020-02-05 ENCOUNTER — OFFICE VISIT (OUTPATIENT)
Dept: CARDIOLOGY CLINIC | Age: 65
End: 2020-02-05
Payer: MEDICARE

## 2020-02-05 VITALS
SYSTOLIC BLOOD PRESSURE: 122 MMHG | RESPIRATION RATE: 18 BRPM | DIASTOLIC BLOOD PRESSURE: 60 MMHG | BODY MASS INDEX: 40.43 KG/M2 | HEART RATE: 72 BPM | HEIGHT: 74 IN | WEIGHT: 315 LBS

## 2020-02-05 PROCEDURE — G8427 DOCREV CUR MEDS BY ELIG CLIN: HCPCS | Performed by: INTERNAL MEDICINE

## 2020-02-05 PROCEDURE — 1036F TOBACCO NON-USER: CPT | Performed by: INTERNAL MEDICINE

## 2020-02-05 PROCEDURE — 4040F PNEUMOC VAC/ADMIN/RCVD: CPT | Performed by: INTERNAL MEDICINE

## 2020-02-05 PROCEDURE — G8482 FLU IMMUNIZE ORDER/ADMIN: HCPCS | Performed by: INTERNAL MEDICINE

## 2020-02-05 PROCEDURE — 93000 ELECTROCARDIOGRAM COMPLETE: CPT | Performed by: INTERNAL MEDICINE

## 2020-02-05 PROCEDURE — 99214 OFFICE O/P EST MOD 30 MIN: CPT | Performed by: INTERNAL MEDICINE

## 2020-02-05 PROCEDURE — G8417 CALC BMI ABV UP PARAM F/U: HCPCS | Performed by: INTERNAL MEDICINE

## 2020-02-05 PROCEDURE — 3017F COLORECTAL CA SCREEN DOC REV: CPT | Performed by: INTERNAL MEDICINE

## 2020-02-05 PROCEDURE — 1123F ACP DISCUSS/DSCN MKR DOCD: CPT | Performed by: INTERNAL MEDICINE

## 2020-02-05 RX ORDER — CA/D3/MAG OX/ZINC/COP/MANG/BOR 600 MG-800
TABLET,CHEWABLE ORAL
COMMUNITY

## 2020-02-28 ENCOUNTER — HOSPITAL ENCOUNTER (OUTPATIENT)
Dept: CARDIAC REHAB | Age: 65
Discharge: HOME | End: 2020-02-28

## 2020-02-28 VITALS
HEART RATE: 68 BPM | RESPIRATION RATE: 20 BRPM | HEIGHT: 74 IN | SYSTOLIC BLOOD PRESSURE: 117 MMHG | WEIGHT: 315 LBS | DIASTOLIC BLOOD PRESSURE: 73 MMHG | BODY MASS INDEX: 40.43 KG/M2

## 2020-02-28 ASSESSMENT — PATIENT HEALTH QUESTIONNAIRE - PHQ9: SUM OF ALL RESPONSES TO PHQ QUESTIONS 1-9: 5

## 2020-03-03 PROBLEM — B35.4 TINEA CORPORIS: Status: ACTIVE | Noted: 2020-03-03

## 2020-03-09 NOTE — PROGRESS NOTES
[facility]  [facility]. Jean Claude Arias M.D. Elda Scott M.D.  Ceferino Saravia. Sarah Cheek M.D. Magda Manning M.D. Spencer Diaz M.D. MD Martin Joseph   1955  Ana Matamoros DO      This 26-year-old man is seen today for outpatient cardiac follow-up. He was hospitalized 10/14/2019 through 10/18/2019 for COPD exacerbation and atrial fibrillation with rapid ventricular response. He was anticoagulated for FQP3CY8-ILYv= 2.  proBNP was only 434 although it was felt that this may have been depressed by obesity. He was diuresed for 4 L. He was treated with duo nebs and Solu-Medrol. Viral panel was negative. Echo showed EF of 61%. Left atrium is dilated. Left atrial pressure was not elevated. RVSP 31. He had outpatient pulmonary follow-up 02/07/2020. It was felt that he was improving and the plan was to adjust prednisone, begin to wean off albuterol, and give a 3-day course of Bumex.        He was hospitalized 12/07/2019 through 12/13/2019. He had a left chest wall abscess/cellulitis which was incised and drained. He was seen by nephrology for BUN 27, creatinine 1.6 and GFR 24. This was attributed to intravascular volume depletion primarily. Medical History:  1. History of cigarette abuse 2 ppd x 39 years quit in 2014.  2. ETOH abuse up to 12 beers a day  drinking 3-4 beers a day 2018 and 2019.  3. Duodenal ulcer in 1970's. 4. Morbid obesity. BMI  - 03/2020.  5. HTN. 6. Hearing loss. 7. Hx LE cellulitis. 8. Bilateral carpal tunnel syndrome. 9. 07/2015 Bilateral serous OM s/p bilateral tympanostomy and tubes. 10. 07/2015 Nasal endoscopy and biopsy of nasopharyngeal lesion. 11. MARISEL non-complaint with C-pap--> With repeat sleep study done 3/8/2016 showing moderate sleep apnea syndrome with significant oxygen  desaturation.    12. COPD Gold Stage II.  13. 04/30/2018, Mechanical fall resulting in left displaced lateral mallelous and deltoid ligament injury s/p  ORIF L lateral malleolus. 14. s/p Lumbar laminectomy. 07/02/2018 Hernia repair, uvulectomy 1990's, bilateral cataract   15. Hospitalized 10/14/2019 through 10/18/2019 for COPD exacerbation and atrial fibrillation with rapid ventricular response. He was anticoagulated for VJH6GF0-NQIk= 2.  proBNP was only 434 although it was felt that this may have been depressed by obesity. He was diuresed for 4 L.  16. Echo 10/14/2019: Biplane contrast EF 61%. Left atrium mildly dilated. AF.  RVSP 31. No significant valve abnormality  17. Hospitalized 12/07/2019 through 12/13/2019. He had a left chest wall abscess/cellulitis which was incised and drained. He was seen by nephrology for BUN 27, creatinine 1.6 and GFR 24. This was attributed to intravascular volume depletion primarily. Review of Systems:  Constitutional: negative for fever and chills  Respiratory: negative for cough and hemoptysis  Cardiovascular:   Gastrointestinal: negative for abdominal pain, diarrhea, nausea and vomiting  Genitourinary:negative for dysuria and hematuria  Derm: negative for rash and skin lesion(s)  Neurological: negative for seizures and tremors  Endocrine: negative for diabetic symptoms including polydipsia and polyuria  Musculoskeletal: negative for CTD  Psychiatric: negative for psychosis and major depression    On examination, he is a obese middle-aged male in no acute distress. Skin is warm and dry. Respirations are unlabored. /74   Pulse 69   Resp 16   Ht 6' 2\" (1.88 m)   Wt (!) 321 lb 9.6 oz (145.9 kg)   BMI 41.29 kg/m² . HEENT negative for scleral icterus. Extraocular muscles intact. No facial asymmetry or central cyanosis. Neck without masses or goiter. No bruit or JVD. Cardiac apex not displaced. Rhythm regular. Abdomen normal.  Extremities without edema.   Breath sounds are distant with scattered rhonchi bilaterally    EKG today shows atrial fibrillation with ventricular response 69/min. Isolated PVCs. Possible old septal MI. The patient today has no evidence for decompensated CHF. His main problem remains the severity of his pulmonary disease. He remains at risk for decompensasion but this will likely occur in the context of or secondary to AECOPD. He is asked to continue close follow-up with his pulmonary physicians and he will be seen back by [id number] [facility] in 6 months. At completion of today's visit, medications include the following:    Current Outpatient Medications:     albuterol sulfate HFA (PROAIR HFA) 108 (90 Base) MCG/ACT inhaler, USE 2 INHALATIONS EVERY 4 HOURS AS NEEDED, Disp: 18 g, Rfl: 3    levoFLOXacin (LEVAQUIN) 500 MG tablet, Take 1 tablet by mouth daily for 10 days, Disp: 10 tablet, Rfl: 0    nystatin (MYCOSTATIN) 062369 UNIT/GM cream, Apply 4 x per day for a least 2 weeks, Disp: 90 g, Rfl: 1    Probiotic Product (PROBIOTIC ADVANCED) CAPS, Take by mouth, Disp: , Rfl:     albuterol (PROVENTIL) (2.5 MG/3ML) 0.083% nebulizer solution, Take 3 mLs by nebulization every 6 hours as needed for Wheezing or Shortness of Breath DX: COPD J44.9, Disp: 120 each, Rfl: 5    irbesartan (AVAPRO) 300 MG tablet, Take 1 tablet by mouth nightly, Disp: 90 tablet, Rfl: 0    umeclidinium-vilanterol (ANORO ELLIPTA) 62.5-25 MCG/INH AEPB inhaler, USE 1 INHALATION DAILY, Disp: 180 each, Rfl: 3    apixaban (ELIQUIS) 5 MG TABS tablet, Take 1 tablet by mouth 2 times daily, Disp: 60 tablet, Rfl: 2    metoprolol tartrate (LOPRESSOR) 25 MG tablet, Take 1 tablet by mouth 2 times daily, Disp: 60 tablet, Rfl: 3    Multiple Vitamins-Minerals (THERAPEUTIC MULTIVITAMIN-MINERALS) tablet, Take 1 tablet by mouth daily, Disp: , Rfl:       Note: This report was completed utilizing computer voice recognition software. Every effort has been made to ensure accuracy, however; inadvertent computerized transcription errors may be present. Martha Prakash.  Annamarie Carmona MD

## 2020-03-11 ENCOUNTER — HOSPITAL ENCOUNTER (OUTPATIENT)
Age: 65
Discharge: HOME | End: 2020-03-13
Payer: MEDICARE

## 2020-03-11 ENCOUNTER — HOSPITAL ENCOUNTER (OUTPATIENT)
Dept: GENERAL RADIOLOGY | Age: 65
Discharge: HOME | End: 2020-03-13
Payer: MEDICARE

## 2020-03-11 PROCEDURE — 71046 X-RAY EXAM CHEST 2 VIEWS: CPT

## 2020-03-16 ENCOUNTER — TELEPHONE (OUTPATIENT)
Dept: CARDIAC REHAB | Age: 65
End: 2020-03-16

## 2020-03-16 NOTE — TELEPHONE ENCOUNTER
3/16/2020 1345 Nutrition: Spoke with patient wife, Tristan , on this date by phone regarding appointment scheduled for 3/30/2020 at 4 pm due to department closure. Plan to reschedule appointment when department reopens.  Electronically signed by Alana Murphy RD, LD on 3/16/20 at 1:46 PM EDT

## 2020-03-18 ENCOUNTER — OFFICE VISIT (OUTPATIENT)
Dept: CARDIOLOGY CLINIC | Age: 65
End: 2020-03-18
Payer: MEDICARE

## 2020-03-18 VITALS
HEART RATE: 69 BPM | RESPIRATION RATE: 16 BRPM | WEIGHT: 315 LBS | DIASTOLIC BLOOD PRESSURE: 74 MMHG | SYSTOLIC BLOOD PRESSURE: 118 MMHG | BODY MASS INDEX: 40.43 KG/M2 | HEIGHT: 74 IN

## 2020-03-18 PROCEDURE — 1123F ACP DISCUSS/DSCN MKR DOCD: CPT | Performed by: INTERNAL MEDICINE

## 2020-03-18 PROCEDURE — 93000 ELECTROCARDIOGRAM COMPLETE: CPT | Performed by: INTERNAL MEDICINE

## 2020-03-18 PROCEDURE — 1036F TOBACCO NON-USER: CPT | Performed by: INTERNAL MEDICINE

## 2020-03-18 PROCEDURE — 99213 OFFICE O/P EST LOW 20 MIN: CPT | Performed by: INTERNAL MEDICINE

## 2020-03-18 PROCEDURE — G8482 FLU IMMUNIZE ORDER/ADMIN: HCPCS | Performed by: INTERNAL MEDICINE

## 2020-03-18 PROCEDURE — 4040F PNEUMOC VAC/ADMIN/RCVD: CPT | Performed by: INTERNAL MEDICINE

## 2020-03-18 PROCEDURE — 3017F COLORECTAL CA SCREEN DOC REV: CPT | Performed by: INTERNAL MEDICINE

## 2020-03-18 PROCEDURE — G8427 DOCREV CUR MEDS BY ELIG CLIN: HCPCS | Performed by: INTERNAL MEDICINE

## 2020-03-18 PROCEDURE — G8417 CALC BMI ABV UP PARAM F/U: HCPCS | Performed by: INTERNAL MEDICINE

## 2020-03-19 ENCOUNTER — TELEPHONE (OUTPATIENT)
Dept: CARDIAC REHAB | Age: 65
End: 2020-03-19

## 2020-03-19 NOTE — TELEPHONE ENCOUNTER
3/19/2020 11:58 am Nutrition: Follow up phone call done due to department closure. Spoke with patient wife due to patient has difficulty hearing. RD informed wife of nutrition counseling available by phone. Wife verbalized understanding. Contact information provided. Follow up PRN.  Electronically signed by Alondra Sanders RD, LD on 3/19/20 at 12:00 PM EDT

## 2020-03-23 ENCOUNTER — HOSPITAL ENCOUNTER (OUTPATIENT)
Dept: GENERAL RADIOLOGY | Age: 65
Discharge: HOME | End: 2020-03-25
Payer: MEDICARE

## 2020-03-23 ENCOUNTER — OFFICE VISIT (OUTPATIENT)
Dept: PRIMARY CARE CLINIC | Age: 65
End: 2020-03-23
Payer: MEDICARE

## 2020-03-23 ENCOUNTER — HOSPITAL ENCOUNTER (OUTPATIENT)
Age: 65
Discharge: HOME | End: 2020-03-25
Payer: MEDICARE

## 2020-03-23 VITALS
HEART RATE: 84 BPM | DIASTOLIC BLOOD PRESSURE: 72 MMHG | TEMPERATURE: 98.7 F | BODY MASS INDEX: 40.06 KG/M2 | OXYGEN SATURATION: 94 % | WEIGHT: 312 LBS | SYSTOLIC BLOOD PRESSURE: 115 MMHG

## 2020-03-23 PROCEDURE — 3023F SPIROM DOC REV: CPT | Performed by: FAMILY MEDICINE

## 2020-03-23 PROCEDURE — G8427 DOCREV CUR MEDS BY ELIG CLIN: HCPCS | Performed by: FAMILY MEDICINE

## 2020-03-23 PROCEDURE — G8482 FLU IMMUNIZE ORDER/ADMIN: HCPCS | Performed by: FAMILY MEDICINE

## 2020-03-23 PROCEDURE — 99213 OFFICE O/P EST LOW 20 MIN: CPT | Performed by: FAMILY MEDICINE

## 2020-03-23 PROCEDURE — 1123F ACP DISCUSS/DSCN MKR DOCD: CPT | Performed by: FAMILY MEDICINE

## 2020-03-23 PROCEDURE — 1036F TOBACCO NON-USER: CPT | Performed by: FAMILY MEDICINE

## 2020-03-23 PROCEDURE — 3017F COLORECTAL CA SCREEN DOC REV: CPT | Performed by: FAMILY MEDICINE

## 2020-03-23 PROCEDURE — 71046 X-RAY EXAM CHEST 2 VIEWS: CPT

## 2020-03-23 PROCEDURE — G8417 CALC BMI ABV UP PARAM F/U: HCPCS | Performed by: FAMILY MEDICINE

## 2020-03-23 PROCEDURE — G8926 SPIRO NO PERF OR DOC: HCPCS | Performed by: FAMILY MEDICINE

## 2020-03-23 PROCEDURE — 4040F PNEUMOC VAC/ADMIN/RCVD: CPT | Performed by: FAMILY MEDICINE

## 2020-03-23 RX ORDER — BENZONATATE 100 MG/1
100 CAPSULE ORAL 3 TIMES DAILY PRN
Qty: 21 CAPSULE | Refills: 0 | Status: SHIPPED | OUTPATIENT
Start: 2020-03-23 | End: 2020-03-30

## 2020-03-23 RX ORDER — PREDNISONE 20 MG/1
40 TABLET ORAL DAILY
Qty: 14 TABLET | Refills: 0 | Status: SHIPPED | OUTPATIENT
Start: 2020-03-23 | End: 2020-03-30

## 2020-03-23 NOTE — PATIENT INSTRUCTIONS
disease;  · mental illness or psychosis; or  · a muscle disorder such as myasthenia gravis. Long-term use of steroids may lead to bone loss (osteoporosis), especially if you smoke or drink alcohol, if you do not exercise, or if you do not get enough vitamin D or calcium in your diet. It is not known whether this medicine will harm an unborn baby. Tell your doctor if you are pregnant or plan to become pregnant. You should not breastfeed while using prednisone. How should I take prednisone? Follow all directions on your prescription label and read all medication guides or instruction sheets. Your doctor may occasionally change your dose. Use the medicine exactly as directed. Prednisone is taken daily or every other day, depending on the condition being treated. You may need to take the medicine at a certain time of day. Follow your doctor's instructions about when and how often to take this medicine. Take with food if prednisone upsets your stomach. Measure liquid medicine carefully. Use the dosing syringe provided, or use a medicine dose-measuring device (not a kitchen spoon). Swallow the delayed-release tablet whole and do not crush, chew, or break it. Prednisone can weaken (suppress) your immune system, and you may get an infection more easily. Call your doctor if you have signs of infection (fever, weakness, cold or flu symptoms, skin sores, diarrhea, frequent or recurring illness). If you have major surgery or a severe injury or infection, your prednisone dose needs may change. Make sure any doctor caring for you knows you are using this medicine. If you use this medicine long-term, you may need medical tests and vision exams. In case of emergency, wear or carry medical identification to let others know you use a steroid. You should not stop using prednisone suddenly. Follow your doctor's instructions about tapering your dose. Store at room temperature away from moisture, heat, and light.   What potassium level --leg cramps, constipation, irregular heartbeats, fluttering in your chest, increased thirst or urination, numbness or tingling, muscle weakness or limp feeling. Prednisone can affect growth in children. Tell your doctor if your child is not growing at a normal rate while using this medicine. Common side effects may include:  · weight gain (especially in your face or your upper back and torso);  · increased appetite;  · mood changes, trouble sleeping;  · changes in your menstrual periods;  · problems with memory or thought;  · muscle or joint pain;  · weakness;  · headache, dizziness, spinning sensation;  · nausea, bloating, loss of appetite;  · slow wound healing; or  · acne, increased sweating, thinning skin, bruising, pinpoint spots under your skin. This is not a complete list of side effects and others may occur. Call your doctor for medical advice about side effects. You may report side effects to FDA at 3-170-FDA-7496. What other drugs will affect prednisone? Sometimes it is not safe to use certain medications at the same time. Some drugs can affect your blood levels of other drugs you take, which may increase side effects or make the medications less effective. Tell your doctor about all your current medicines. Many drugs can affect prednisone, especially:  · bupropion;  · cyclosporine;  · digoxin;  · ketoconazole;  · an antibiotic;  · birth control pills or hormone replacement therapy;  · a diuretic or \"water pill\";  · insulin or oral diabetes medicine;  · a blood thinner --warfarin, Coumadin, Jantoven; or  · NSAIDs (nonsteroidal anti-inflammatory drugs) --aspirin, ibuprofen (Advil, Motrin), naproxen (Aleve), celecoxib, diclofenac, indomethacin, meloxicam, and others. This list is not complete and many other drugs may affect prednisone. This includes prescription and over-the-counter medicines, vitamins, and herbal products. Not all possible drug interactions are listed here.   Where always ask your healthcare professional. [place] [address] any warranty or liability for your use of this information.

## 2020-03-30 ENCOUNTER — TELEPHONE (OUTPATIENT)
Dept: PRIMARY CARE CLINIC | Age: 65
End: 2020-03-30

## 2020-04-27 PROBLEM — B35.4 TINEA CORPORIS: Status: RESOLVED | Noted: 2020-03-03 | Resolved: 2020-04-27

## 2020-04-27 PROBLEM — J18.9 CAP (COMMUNITY ACQUIRED PNEUMONIA): Status: RESOLVED | Noted: 2020-01-20 | Resolved: 2020-04-27

## 2020-06-05 ENCOUNTER — APPOINTMENT (OUTPATIENT)
Dept: GENERAL RADIOLOGY | Age: 65
End: 2020-06-05
Payer: MEDICARE

## 2020-06-05 ENCOUNTER — HOSPITAL ENCOUNTER (EMERGENCY)
Age: 65
Discharge: HOME | End: 2020-06-05
Attending: EMERGENCY MEDICINE
Payer: MEDICARE

## 2020-06-05 VITALS
TEMPERATURE: 97 F | RESPIRATION RATE: 14 BRPM | WEIGHT: 315 LBS | DIASTOLIC BLOOD PRESSURE: 59 MMHG | OXYGEN SATURATION: 93 % | BODY MASS INDEX: 40.43 KG/M2 | HEART RATE: 81 BPM | SYSTOLIC BLOOD PRESSURE: 107 MMHG | HEIGHT: 74 IN

## 2020-06-05 LAB
ANION GAP SERPL CALCULATED.3IONS-SCNC: 10 MMOL/L (ref 7–16)
BASOPHILS ABSOLUTE: 0.06 E9/L (ref 0–0.2)
BASOPHILS RELATIVE PERCENT: 0.6 % (ref 0–2)
BUN BLDV-MCNC: 13 MG/DL (ref 8–23)
CALCIUM SERPL-MCNC: 9.6 MG/DL (ref 8.6–10.2)
CHLORIDE BLD-SCNC: 103 MMOL/L (ref 98–107)
CO2: 25 MMOL/L (ref 22–29)
CREAT SERPL-MCNC: 1.2 MG/DL (ref 0.7–1.2)
EKG ATRIAL RATE: 129 BPM
EKG Q-T INTERVAL: 360 MS
EKG QRS DURATION: 86 MS
EKG QTC CALCULATION (BAZETT): 428 MS
EKG R AXIS: 30 DEGREES
EKG T AXIS: 44 DEGREES
EKG VENTRICULAR RATE: 85 BPM
EOSINOPHILS ABSOLUTE: 0.72 E9/L (ref 0.05–0.5)
EOSINOPHILS RELATIVE PERCENT: 7.4 % (ref 0–6)
GFR AFRICAN AMERICAN: >60
GFR NON-AFRICAN AMERICAN: >60 ML/MIN/1.73
GLUCOSE BLD-MCNC: 102 MG/DL (ref 74–99)
HCT VFR BLD CALC: 49.5 % (ref 37–54)
HEMOGLOBIN: 16.5 G/DL (ref 12.5–16.5)
IMMATURE GRANULOCYTES #: 0.05 E9/L
IMMATURE GRANULOCYTES %: 0.5 % (ref 0–5)
LYMPHOCYTES ABSOLUTE: 3.49 E9/L (ref 1.5–4)
LYMPHOCYTES RELATIVE PERCENT: 35.7 % (ref 20–42)
MCH RBC QN AUTO: 31.7 PG (ref 26–35)
MCHC RBC AUTO-ENTMCNC: 33.3 % (ref 32–34.5)
MCV RBC AUTO: 95 FL (ref 80–99.9)
MONOCYTES ABSOLUTE: 0.98 E9/L (ref 0.1–0.95)
MONOCYTES RELATIVE PERCENT: 10 % (ref 2–12)
NEUTROPHILS ABSOLUTE: 4.47 E9/L (ref 1.8–7.3)
NEUTROPHILS RELATIVE PERCENT: 45.8 % (ref 43–80)
PDW BLD-RTO: 14.2 FL (ref 11.5–15)
PLATELET # BLD: 234 E9/L (ref 130–450)
PMV BLD AUTO: 10.1 FL (ref 7–12)
POTASSIUM REFLEX MAGNESIUM: 4.5 MMOL/L (ref 3.5–5)
PRO-BNP: 240 PG/ML (ref 0–125)
RBC # BLD: 5.21 E12/L (ref 3.8–5.8)
SODIUM BLD-SCNC: 138 MMOL/L (ref 132–146)
TROPONIN: <0.01 NG/ML (ref 0–0.03)
WBC # BLD: 9.8 E9/L (ref 4.5–11.5)

## 2020-06-05 PROCEDURE — 71045 X-RAY EXAM CHEST 1 VIEW: CPT

## 2020-06-05 PROCEDURE — 6370000000 HC RX 637 (ALT 250 FOR IP): Performed by: EMERGENCY MEDICINE

## 2020-06-05 PROCEDURE — 99285 EMERGENCY DEPT VISIT HI MDM: CPT

## 2020-06-05 PROCEDURE — 84484 ASSAY OF TROPONIN QUANT: CPT

## 2020-06-05 PROCEDURE — 93010 ELECTROCARDIOGRAM REPORT: CPT | Performed by: INTERNAL MEDICINE

## 2020-06-05 PROCEDURE — 80048 BASIC METABOLIC PNL TOTAL CA: CPT

## 2020-06-05 PROCEDURE — 85025 COMPLETE CBC W/AUTO DIFF WBC: CPT

## 2020-06-05 PROCEDURE — 83880 ASSAY OF NATRIURETIC PEPTIDE: CPT

## 2020-06-05 PROCEDURE — 93005 ELECTROCARDIOGRAM TRACING: CPT | Performed by: EMERGENCY MEDICINE

## 2020-06-05 RX ORDER — IPRATROPIUM BROMIDE AND ALBUTEROL SULFATE 2.5; .5 MG/3ML; MG/3ML
1 SOLUTION RESPIRATORY (INHALATION)
Status: COMPLETED | OUTPATIENT
Start: 2020-06-05 | End: 2020-06-05

## 2020-06-05 RX ORDER — PREDNISONE 20 MG/1
60 TABLET ORAL ONCE
Status: COMPLETED | OUTPATIENT
Start: 2020-06-05 | End: 2020-06-05

## 2020-06-05 RX ORDER — PREDNISONE 10 MG/1
40 TABLET ORAL DAILY
Qty: 20 TABLET | Refills: 0 | Status: SHIPPED | OUTPATIENT
Start: 2020-06-05 | End: 2020-06-10

## 2020-06-05 RX ADMIN — PREDNISONE 60 MG: 20 TABLET ORAL at 06:03

## 2020-06-05 RX ADMIN — IPRATROPIUM BROMIDE AND ALBUTEROL SULFATE 1 AMPULE: .5; 3 SOLUTION RESPIRATORY (INHALATION) at 06:06

## 2020-06-05 RX ADMIN — IPRATROPIUM BROMIDE AND ALBUTEROL SULFATE 1 AMPULE: .5; 3 SOLUTION RESPIRATORY (INHALATION) at 06:04

## 2020-06-05 RX ADMIN — IPRATROPIUM BROMIDE AND ALBUTEROL SULFATE 1 AMPULE: .5; 3 SOLUTION RESPIRATORY (INHALATION) at 06:03

## 2020-06-05 NOTE — ED PROVIDER NOTES
HPI:  6/5/20, Time: 5:58 AM EDT         Sid Crouch is a 71 y.o. male presenting to the ED for shortness of breath beginning a few days ago. Patient has a history of COPD and A. fib on Eliquis. He has been compliant with his medications. He called his doctor on Monday due to cough with increased sputum production and difficulty breathing. He was started on Levaquin which she began Tuesday. He is not currently on steroids. He follows with Dr. Wilner Peterson with pulmonology. He states that he occasionally has COPD exacerbations. He last was on a course of prednisone approximately 1 month ago. He is not currently on steroids. He denies documented fevers, hemoptysis, chest pain, abdominal pain, nausea, vomiting, lower extremity edema, and calf tenderness. Patient has no baseline oxygen requirement. Review of Systems:   Pertinent positives and negatives are stated within HPI, all other systems reviewed and are negative.          --------------------------------------------- PAST HISTORY ---------------------------------------------  Past Medical History:  has a past medical history of Atrial fibrillation ([facility] Hypertension, Osteoarthritis, Sleep apnea, and Spinal stenosis. Past Surgical History:  has a past surgical history that includes hernia repair; Uvulectomy; Nose surgery; Tympanostomy tube placement; open treatment fracture distal tibia fibula (Left, 4/29/2018); and pr laminectomy,>2 sgmt,lumbar (N/A, 7/2/2018). Social History:  reports that he quit smoking about 6 years ago. His smoking use included cigars and cigarettes. He started smoking about 46 years ago. He has a 78.00 pack-year smoking history. He has never used smokeless tobacco. He reports current alcohol use. He reports that he does not use drugs.     Family History: family history includes Cancer in his mother; Cirrhosis in his father; High Blood Pressure in his brother; Lupus in his

## 2020-06-06 ENCOUNTER — CARE COORDINATION (OUTPATIENT)
Dept: CARE COORDINATION | Age: 65
End: 2020-06-06

## 2020-06-06 NOTE — CARE COORDINATION
Spoke with Shlomo Mcfarlane today following up on patients ED visit. Liliya voices that Jones Hurst is still in bed at time of call but is doing \"just fine\". Voices they picked up prescription from pharmacy yesterday and began taking as prescribed. Denies any further questions/concerns. Will follow up with PCP as needed. Declined future follow up call. Declined LOOP enrollment. Patient contacted regarding recent discharge and COVID-19 risk. Discussed COVID-19 related testing which was not done at this time. Test results were not done. Patient informed of results, if available? Yes     Care Transition Nurse/ Ambulatory Care Manager contacted the family by telephone to perform post discharge assessment. Verified name and  with family as identifiers. Patient has following risk factors of: COPD. CTN/ACM reviewed discharge instructions, medical action plan and red flags related to discharge diagnosis. Reviewed and educated them on any new and changed medications related to discharge diagnosis. Advised obtaining a 90-day supply of all daily and as-needed medications. Education provided regarding infection prevention, and signs and symptoms of COVID-19 and when to seek medical attention with family who verbalized understanding. Discussed exposure protocols and quarantine from [address] you at higher risk for severe illness 2019 and given an opportunity for questions and concerns. The family agrees to contact the COVID-19 hotline [phone] or PCP office for questions related to their healthcare. CTN/ACM provided contact information for future reference. From CDC: Are you at higher risk for severe illness?  Wash your hands often.  Avoid close contact (6 feet, which is about two arm lengths) with people who are sick.  Put distance between yourself and other people if COVID-19 is spreading in your community.  Clean and disinfect frequently touched surfaces.    Avoid all cruise travel and

## 2020-06-15 PROBLEM — R10.12 LEFT UPPER QUADRANT PAIN: Status: RESOLVED | Noted: 2019-10-22 | Resolved: 2020-06-15

## 2020-06-15 PROBLEM — M54.42 MIDLINE LOW BACK PAIN WITH BILATERAL SCIATICA: Status: RESOLVED | Noted: 2018-05-11 | Resolved: 2020-06-15

## 2020-06-15 PROBLEM — M48.061 DEGENERATIVE LUMBAR SPINAL STENOSIS: Status: RESOLVED | Noted: 2018-07-02 | Resolved: 2020-06-15

## 2020-06-15 PROBLEM — L23.89 ALLERGIC CONTACT DERMATITIS DUE TO OTHER AGENTS: Status: RESOLVED | Noted: 2019-05-13 | Resolved: 2020-06-15

## 2020-06-15 PROBLEM — M54.41 MIDLINE LOW BACK PAIN WITH BILATERAL SCIATICA: Status: RESOLVED | Noted: 2018-05-11 | Resolved: 2020-06-15

## 2020-06-15 PROBLEM — S82.62XA DISPLACED FRACTURE OF LATERAL MALLEOLUS OF LEFT FIBULA, INITIAL ENCOUNTER FOR CLOSED FRACTURE: Status: RESOLVED | Noted: 2018-04-29 | Resolved: 2020-06-15

## 2020-06-15 PROBLEM — M48.061 SPINAL STENOSIS OF LUMBAR REGION WITHOUT NEUROGENIC CLAUDICATION: Status: RESOLVED | Noted: 2018-05-04 | Resolved: 2020-06-15

## 2020-06-15 PROBLEM — S93.422A: Status: RESOLVED | Noted: 2018-04-29 | Resolved: 2020-06-15

## 2020-06-15 PROBLEM — S39.012A LUMBOSACRAL STRAIN: Status: RESOLVED | Noted: 2019-08-20 | Resolved: 2020-06-15

## 2020-06-15 PROBLEM — B35.1 ONYCHOMYCOSIS DUE TO DERMATOPHYTE: Status: RESOLVED | Noted: 2019-01-14 | Resolved: 2020-06-15

## 2020-06-17 ENCOUNTER — OFFICE VISIT (OUTPATIENT)
Dept: CARDIOLOGY CLINIC | Age: 65
End: 2020-06-17
Payer: MEDICARE

## 2020-06-17 VITALS
RESPIRATION RATE: 18 BRPM | HEIGHT: 74 IN | BODY MASS INDEX: 40.43 KG/M2 | TEMPERATURE: 96.8 F | DIASTOLIC BLOOD PRESSURE: 60 MMHG | SYSTOLIC BLOOD PRESSURE: 138 MMHG | HEART RATE: 76 BPM | WEIGHT: 315 LBS

## 2020-06-17 PROCEDURE — 4040F PNEUMOC VAC/ADMIN/RCVD: CPT | Performed by: INTERNAL MEDICINE

## 2020-06-17 PROCEDURE — 99213 OFFICE O/P EST LOW 20 MIN: CPT | Performed by: INTERNAL MEDICINE

## 2020-06-17 PROCEDURE — 1123F ACP DISCUSS/DSCN MKR DOCD: CPT | Performed by: INTERNAL MEDICINE

## 2020-06-17 PROCEDURE — 3017F COLORECTAL CA SCREEN DOC REV: CPT | Performed by: INTERNAL MEDICINE

## 2020-06-17 PROCEDURE — G8427 DOCREV CUR MEDS BY ELIG CLIN: HCPCS | Performed by: INTERNAL MEDICINE

## 2020-06-17 PROCEDURE — G8417 CALC BMI ABV UP PARAM F/U: HCPCS | Performed by: INTERNAL MEDICINE

## 2020-06-17 PROCEDURE — 93000 ELECTROCARDIOGRAM COMPLETE: CPT | Performed by: INTERNAL MEDICINE

## 2020-06-17 PROCEDURE — 1036F TOBACCO NON-USER: CPT | Performed by: INTERNAL MEDICINE

## 2020-06-17 NOTE — PROGRESS NOTES
15. Hospitalized 10/14/2019 through 10/18/2019 for COPD exacerbation and atrial fibrillation with rapid ventricular response. He was anticoagulated for [id number]= 2.  proBNP was only 434 although it was felt that this may have been depressed by obesity. He was diuresed for 4 L.  16. Echo 10/14/2019: Biplane contrast EF 61%. Left atrium mildly dilated. AF.  RVSP 31. No significant valve abnormality  17. Hospitalized 12/07/2019 through 12/13/2019. He had a left chest wall abscess/cellulitis which was incised and drained. He was seen by nephrology for BUN 27, creatinine 1.6 and GFR 24. This was attributed to intravascular volume depletion primarily. Review of Systems:  Constitutional: negative for fever and chills  Respiratory: negative for cough and hemoptysis  Cardiovascular:   Gastrointestinal: negative for abdominal pain, diarrhea, nausea and vomiting  Genitourinary:negative for dysuria and hematuria  Derm: negative for rash and skin lesion(s)  Neurological: negative for seizures and tremors  Endocrine: negative for diabetic symptoms including polydipsia and polyuria  Musculoskeletal: negative for CTD  Psychiatric: negative for psychosis and major depression    On examination, he is an alert pleasant appropriate obese large framed  male in no distress. Skin is warm and dry. Respirations are unlabored. /60   Pulse 76   Temp 96.8 °F (36 °C)   Resp 18   Ht 6' 2\" (1.88 m)   Wt (!) 335 lb 12.8 oz (152.3 kg)   BMI 43.11 kg/m² . HEENT negative for scleral icterus. Extraocular muscles intact. No facial asymmetry or central cyanosis. Neck without masses or goiter. No bruit or JVD. Cardiac apex not displaced. Rhythm regular. Abdomen normal.  Extremities without edema. Left ankle chronically swollen due to trauma    EKG today shows atrial fibrillation with controlled ventricular response.   Possible old septal MI        At completion of today's visit, medications include

## 2020-06-27 PROCEDURE — 9900000038 HC CARDIAC REHAB PHASE 3 - MONTHLY

## 2020-06-29 ENCOUNTER — HOSPITAL ENCOUNTER (OUTPATIENT)
Dept: CARDIAC REHAB | Age: 65
Discharge: HOME | End: 2020-06-29

## 2020-07-01 PROBLEM — R05.9 COUGH: Status: ACTIVE | Noted: 2020-07-01

## 2020-07-01 PROBLEM — S93.499A: Status: ACTIVE | Noted: 2018-04-29

## 2020-07-14 PROBLEM — J32.9 CHRONIC SINUSITIS WITH RECURRENT BRONCHITIS: Status: ACTIVE | Noted: 2020-07-14

## 2020-07-14 PROBLEM — J40 CHRONIC SINUSITIS WITH RECURRENT BRONCHITIS: Status: ACTIVE | Noted: 2020-07-14

## 2020-07-27 ENCOUNTER — HOSPITAL ENCOUNTER (OUTPATIENT)
Dept: CARDIAC REHAB | Age: 65
Discharge: HOME | End: 2020-07-27

## 2020-07-27 PROCEDURE — 9900000038 HC CARDIAC REHAB PHASE 3 - MONTHLY

## 2020-07-31 PROBLEM — R05.9 COUGH: Status: RESOLVED | Noted: 2020-07-01 | Resolved: 2020-07-31

## 2020-09-15 PROBLEM — J32.9 CHRONIC SINUSITIS WITH RECURRENT BRONCHITIS: Status: RESOLVED | Noted: 2020-07-14 | Resolved: 2020-09-15

## 2020-09-15 PROBLEM — Z98.890 STATUS POST LUMBAR LAMINECTOMY: Status: RESOLVED | Noted: 2019-03-11 | Resolved: 2020-09-15

## 2020-09-15 PROBLEM — J40 CHRONIC SINUSITIS WITH RECURRENT BRONCHITIS: Status: RESOLVED | Noted: 2020-07-14 | Resolved: 2020-09-15

## 2021-03-22 ENCOUNTER — CARE COORDINATION (OUTPATIENT)
Dept: CARE COORDINATION | Age: 66
End: 2021-03-22

## 2021-03-22 NOTE — CARE COORDINATION
attempt to reach patient to offer [facility]. There was no answer. A message was left to have patient call back. Office number left. [phone].

## 2021-04-01 ENCOUNTER — OFFICE VISIT (OUTPATIENT)
Dept: CARDIOLOGY CLINIC | Age: 66
End: 2021-04-01
Payer: MEDICARE

## 2021-04-01 VITALS
HEART RATE: 62 BPM | RESPIRATION RATE: 18 BRPM | DIASTOLIC BLOOD PRESSURE: 84 MMHG | HEIGHT: 74 IN | WEIGHT: 315 LBS | BODY MASS INDEX: 40.43 KG/M2 | SYSTOLIC BLOOD PRESSURE: 128 MMHG

## 2021-04-01 DIAGNOSIS — I48.20 CHRONIC ATRIAL FIBRILLATION (HCC): Primary | ICD-10-CM

## 2021-04-01 PROCEDURE — G8417 CALC BMI ABV UP PARAM F/U: HCPCS | Performed by: INTERNAL MEDICINE

## 2021-04-01 PROCEDURE — 99212 OFFICE O/P EST SF 10 MIN: CPT | Performed by: INTERNAL MEDICINE

## 2021-04-01 PROCEDURE — 1036F TOBACCO NON-USER: CPT | Performed by: INTERNAL MEDICINE

## 2021-04-01 PROCEDURE — 1123F ACP DISCUSS/DSCN MKR DOCD: CPT | Performed by: INTERNAL MEDICINE

## 2021-04-01 PROCEDURE — G8427 DOCREV CUR MEDS BY ELIG CLIN: HCPCS | Performed by: INTERNAL MEDICINE

## 2021-04-01 PROCEDURE — 93000 ELECTROCARDIOGRAM COMPLETE: CPT | Performed by: INTERNAL MEDICINE

## 2021-04-01 PROCEDURE — 3017F COLORECTAL CA SCREEN DOC REV: CPT | Performed by: INTERNAL MEDICINE

## 2021-04-01 PROCEDURE — 4040F PNEUMOC VAC/ADMIN/RCVD: CPT | Performed by: INTERNAL MEDICINE

## 2021-04-01 RX ORDER — CETIRIZINE HYDROCHLORIDE 10 MG/1
10 TABLET ORAL DAILY
COMMUNITY
End: 2022-02-11

## 2021-04-01 NOTE — PROGRESS NOTES
He reports he has been feeling well. He has been physically active and denies exertionally related symptoms. [facility]  Cosme Valero. Aneesh Stevens M.D. Monet Chang M.D.  Froylan Neves. Nino Leslie M.D. Caroline Parrish, Daniel Patrick M.D. Sabas Zavala M.D. MD Timothy Carrion   1955  Ana Matamoros DO      This 69-year-old man had outpatient cardiac assessment today. Today he reports overall feeling well. He states he goes his activities of daily living and is not having any dyspnea or chest pain. He has a history of bronchospastic lung disease and  was in the ED 06/05/2020. proBNP =240. Treated with bronchodilators and discharged to home on prednisone. He was hospitalized 10/14/2019 >> 10/18/2019 for COPD exacerbation and atrial fibrillation with rapid ventricular response.  He was anticoagulated for [id number]= 2.  proBNP was only 434 although it was felt that this may have been depressed by obesity.  He was diuresed for 4 L.  He was treated with duo nebs and Solu-Medrol.  Viral panel was negative.  Echo showed EF of 61%.  Left atrium is dilated.  Left atrial pressure was not elevated.  RVSP 31.        :  Medical History:  1. History of cigarette abuse 2 ppd x 39 years quit in 2014.  2. ETOH abuse up to 12 beers a day  drinking 3-4 beers a day 2018 and 2019.  3. Duodenal ulcer in 1970's. 4. Morbid obesity. BMI  - 03/2020.  5. HTN. 6. Hearing loss. 7. Hx LE cellulitis. 8. Bilateral carpal tunnel syndrome. 9. 07/2015 Bilateral serous OM s/p bilateral tympanostomy and tubes. 10. 07/2015 Nasal endoscopy and biopsy of nasopharyngeal lesion. 11. MARISEL non-complaint with C-pap--> With repeat sleep study done 3/8/2016 showing moderate sleep apnea syndrome with significant oxygen  desaturation.   12.  COPD Gold Stage II.  13. 04/30/2018, Mechanical fall resulting in left displaced lateral mallelous and deltoid ligament injury s/p  ORIF L today per Dr. Ceferino Leong review atrial fibrillation with ventricular response 62/min. Mild nonspecific inferolateral ST segment elevation consistent with normal variant. The patient is symptomatically and functionally stable. Medications are reviewed today and no changes made. He will have cardiac follow-up in 1 year      At completion of today's visit, medications include the following:    Current Outpatient Medications:     cetirizine (ZYRTEC) 10 MG tablet, Take 10 mg by mouth daily, Disp: , Rfl:     albuterol (PROVENTIL) (2.5 MG/3ML) 0.083% nebulizer solution, TAKE 3 MLS BY NEBULIZATION EVERY 6 HOURS AS NEEDED FOR WHEEZING OR SHORTNESS OF BREATH, Disp: 300 mL, Rfl: 5    metoprolol tartrate (LOPRESSOR) 25 MG tablet, Take 1 tablet by mouth 2 times daily, Disp: 180 tablet, Rfl: 1    irbesartan (AVAPRO) 300 MG tablet, Take 300 mg by mouth nightly, Disp: , Rfl:     SYMBICORT 160-4.5 MCG/ACT AERO, Inhale 2 puffs into the lungs 2 times daily, Disp: 1 Inhaler, Rfl: 3    albuterol sulfate HFA (PROAIR HFA) 108 (90 Base) MCG/ACT inhaler, USE 2 INHALATIONS EVERY 4 HOURS AS NEEDED, Disp: 18 g, Rfl: 3    Probiotic Product (PROBIOTIC ADVANCED) CAPS, Take by mouth, Disp: , Rfl:     apixaban (ELIQUIS) 5 MG TABS tablet, Take 1 tablet by mouth 2 times daily, Disp: 60 tablet, Rfl: 2    Multiple Vitamins-Minerals (THERAPEUTIC MULTIVITAMIN-MINERALS) tablet, Take 1 tablet by mouth daily, Disp: , Rfl:       Note: This report was completed utilizing computer voice recognition software. Every effort has been made to ensure accuracy, however; inadvertent computerized transcription errors may be present.     --Solomon Mccauley MD on 4/2/2021 at 2:50 PM

## 2021-07-19 ENCOUNTER — HOSPITAL ENCOUNTER (OUTPATIENT)
Age: 66
Discharge: HOME | End: 2021-07-21

## 2021-07-19 PROCEDURE — 88305 TISSUE EXAM BY PATHOLOGIST: CPT

## 2021-07-20 ENCOUNTER — APPOINTMENT (OUTPATIENT)
Dept: GENERAL RADIOLOGY | Age: 66
End: 2021-07-20
Payer: MEDICARE

## 2021-07-20 ENCOUNTER — HOSPITAL ENCOUNTER (EMERGENCY)
Age: 66
Discharge: HOME | End: 2021-07-20
Attending: EMERGENCY MEDICINE
Payer: MEDICARE

## 2021-07-20 VITALS
OXYGEN SATURATION: 93 % | WEIGHT: 315 LBS | SYSTOLIC BLOOD PRESSURE: 204 MMHG | HEART RATE: 104 BPM | TEMPERATURE: 98.8 F | HEIGHT: 74 IN | BODY MASS INDEX: 40.43 KG/M2 | DIASTOLIC BLOOD PRESSURE: 111 MMHG | RESPIRATION RATE: 20 BRPM

## 2021-07-20 DIAGNOSIS — J06.9 ACUTE UPPER RESPIRATORY INFECTION: Primary | ICD-10-CM

## 2021-07-20 DIAGNOSIS — J44.9 CHRONIC OBSTRUCTIVE PULMONARY DISEASE, UNSPECIFIED COPD TYPE (HCC): ICD-10-CM

## 2021-07-20 LAB
ANION GAP SERPL CALCULATED.3IONS-SCNC: 11 MMOL/L (ref 7–16)
BASOPHILS ABSOLUTE: 0.05 E9/L (ref 0–0.2)
BASOPHILS RELATIVE PERCENT: 0.5 % (ref 0–2)
BUN BLDV-MCNC: 11 MG/DL (ref 6–23)
CALCIUM SERPL-MCNC: 9.3 MG/DL (ref 8.6–10.2)
CHLORIDE BLD-SCNC: 103 MMOL/L (ref 98–107)
CO2: 22 MMOL/L (ref 22–29)
CREAT SERPL-MCNC: 1 MG/DL (ref 0.7–1.2)
EOSINOPHILS ABSOLUTE: 0.01 E9/L (ref 0.05–0.5)
EOSINOPHILS RELATIVE PERCENT: 0.1 % (ref 0–6)
GFR AFRICAN AMERICAN: >60
GFR NON-AFRICAN AMERICAN: >60 ML/MIN/1.73
GLUCOSE BLD-MCNC: 121 MG/DL (ref 74–99)
HCT VFR BLD CALC: 45.4 % (ref 37–54)
HEMOGLOBIN: 15.5 G/DL (ref 12.5–16.5)
IMMATURE GRANULOCYTES #: 0.04 E9/L
IMMATURE GRANULOCYTES %: 0.4 % (ref 0–5)
LYMPHOCYTES ABSOLUTE: 0.7 E9/L (ref 1.5–4)
LYMPHOCYTES RELATIVE PERCENT: 7.7 % (ref 20–42)
MCH RBC QN AUTO: 32.7 PG (ref 26–35)
MCHC RBC AUTO-ENTMCNC: 34.1 % (ref 32–34.5)
MCV RBC AUTO: 95.8 FL (ref 80–99.9)
MONOCYTES ABSOLUTE: 0.89 E9/L (ref 0.1–0.95)
MONOCYTES RELATIVE PERCENT: 9.8 % (ref 2–12)
NEUTROPHILS ABSOLUTE: 7.43 E9/L (ref 1.8–7.3)
NEUTROPHILS RELATIVE PERCENT: 81.5 % (ref 43–80)
PDW BLD-RTO: 14.1 FL (ref 11.5–15)
PLATELET # BLD: 204 E9/L (ref 130–450)
PMV BLD AUTO: 9.6 FL (ref 7–12)
POTASSIUM SERPL-SCNC: 4.3 MMOL/L (ref 3.5–5)
PRO-BNP: 1232 PG/ML (ref 0–125)
RBC # BLD: 4.74 E12/L (ref 3.8–5.8)
SARS-COV-2, NAAT: NOT DETECTED
SODIUM BLD-SCNC: 136 MMOL/L (ref 132–146)
TROPONIN, HIGH SENSITIVITY: 10 NG/L (ref 0–11)
WBC # BLD: 9.1 E9/L (ref 4.5–11.5)

## 2021-07-20 PROCEDURE — 94664 DEMO&/EVAL PT USE INHALER: CPT

## 2021-07-20 PROCEDURE — 84484 ASSAY OF TROPONIN QUANT: CPT

## 2021-07-20 PROCEDURE — 6360000002 HC RX W HCPCS: Performed by: PHYSICIAN ASSISTANT

## 2021-07-20 PROCEDURE — 6370000000 HC RX 637 (ALT 250 FOR IP): Performed by: PHYSICIAN ASSISTANT

## 2021-07-20 PROCEDURE — 85025 COMPLETE CBC W/AUTO DIFF WBC: CPT

## 2021-07-20 PROCEDURE — 83880 ASSAY OF NATRIURETIC PEPTIDE: CPT

## 2021-07-20 PROCEDURE — 96374 THER/PROPH/DIAG INJ IV PUSH: CPT

## 2021-07-20 PROCEDURE — 93005 ELECTROCARDIOGRAM TRACING: CPT | Performed by: PHYSICIAN ASSISTANT

## 2021-07-20 PROCEDURE — 6370000000 HC RX 637 (ALT 250 FOR IP): Performed by: EMERGENCY MEDICINE

## 2021-07-20 PROCEDURE — 71045 X-RAY EXAM CHEST 1 VIEW: CPT

## 2021-07-20 PROCEDURE — 87635 SARS-COV-2 COVID-19 AMP PRB: CPT

## 2021-07-20 PROCEDURE — 94640 AIRWAY INHALATION TREATMENT: CPT

## 2021-07-20 PROCEDURE — 99284 EMERGENCY DEPT VISIT MOD MDM: CPT

## 2021-07-20 PROCEDURE — 80048 BASIC METABOLIC PNL TOTAL CA: CPT

## 2021-07-20 RX ORDER — BENZONATATE 100 MG/1
100 CAPSULE ORAL 3 TIMES DAILY PRN
Qty: 21 CAPSULE | Refills: 0 | Status: SHIPPED | OUTPATIENT
Start: 2021-07-20 | End: 2021-07-27

## 2021-07-20 RX ORDER — SODIUM CHLORIDE 0.9 % (FLUSH) 0.9 %
10 SYRINGE (ML) INJECTION PRN
Status: DISCONTINUED | OUTPATIENT
Start: 2021-07-20 | End: 2021-07-20 | Stop reason: HOSPADM

## 2021-07-20 RX ORDER — LOSARTAN POTASSIUM 50 MG/1
100 TABLET ORAL ONCE
Status: DISCONTINUED | OUTPATIENT
Start: 2021-07-20 | End: 2021-07-20 | Stop reason: HOSPADM

## 2021-07-20 RX ORDER — IPRATROPIUM BROMIDE AND ALBUTEROL SULFATE 2.5; .5 MG/3ML; MG/3ML
1 SOLUTION RESPIRATORY (INHALATION)
Status: COMPLETED | OUTPATIENT
Start: 2021-07-20 | End: 2021-07-20

## 2021-07-20 RX ORDER — METHYLPREDNISOLONE SODIUM SUCCINATE 125 MG/2ML
125 INJECTION, POWDER, LYOPHILIZED, FOR SOLUTION INTRAMUSCULAR; INTRAVENOUS ONCE
Status: COMPLETED | OUTPATIENT
Start: 2021-07-20 | End: 2021-07-20

## 2021-07-20 RX ORDER — GUAIFENESIN 600 MG/1
600 TABLET, EXTENDED RELEASE ORAL 2 TIMES DAILY
Qty: 30 TABLET | Refills: 0 | Status: SHIPPED | OUTPATIENT
Start: 2021-07-20 | End: 2021-08-04

## 2021-07-20 RX ORDER — IPRATROPIUM BROMIDE 21 UG/1
2 SPRAY, METERED NASAL EVERY 12 HOURS
Qty: 1 BOTTLE | Refills: 3 | Status: SHIPPED | OUTPATIENT
Start: 2021-07-20 | End: 2022-02-11

## 2021-07-20 RX ADMIN — METHYLPREDNISOLONE SODIUM SUCCINATE 125 MG: 125 INJECTION, POWDER, FOR SOLUTION INTRAMUSCULAR; INTRAVENOUS at 15:29

## 2021-07-20 RX ADMIN — METOPROLOL TARTRATE 25 MG: 25 TABLET, FILM COATED ORAL at 16:56

## 2021-07-20 RX ADMIN — IPRATROPIUM BROMIDE AND ALBUTEROL SULFATE 1 AMPULE: .5; 3 SOLUTION RESPIRATORY (INHALATION) at 15:25

## 2021-07-20 RX ADMIN — IPRATROPIUM BROMIDE AND ALBUTEROL SULFATE 1 AMPULE: .5; 3 SOLUTION RESPIRATORY (INHALATION) at 15:26

## 2021-07-20 ASSESSMENT — ENCOUNTER SYMPTOMS
SORE THROAT: 0
RHINORRHEA: 1
WHEEZING: 0
BACK PAIN: 0
SINUS PRESSURE: 0
COUGH: 1
DIARRHEA: 0
EYE REDNESS: 0
VOMITING: 0
SHORTNESS OF BREATH: 0
EYE PAIN: 0
ABDOMINAL PAIN: 0
EYE DISCHARGE: 0
NAUSEA: 0

## 2021-07-20 NOTE — ED PROVIDER NOTES
50-year-old male presents to the emergency department for shortness of breath and nasal congestion. The patient states he symptoms started yesterday after he had a colonoscopy. The patient states he cannot tolerate his CPAP last night because of the breathing. He states no fevers chills chest pain shortness of breath or other complaints. Patient states no nausea vomiting diarrhea constipation or leg swelling. The history is provided by the patient. URI  Presenting symptoms: congestion, cough and rhinorrhea    Presenting symptoms: no ear pain, no fever and no sore throat    Severity:  Mild  Onset quality:  Gradual  Duration:  1 day  Timing:  Intermittent  Progression:  Waxing and waning  Chronicity:  New  Relieved by:  Nothing  Worsened by:  Nothing  Ineffective treatments:  None tried  Associated symptoms: no arthralgias, no headaches and no wheezing    Risk factors: no chronic respiratory disease, no immunosuppression, no recent illness and no sick contacts         Review of Systems   Constitutional: Negative for chills and fever. HENT: Positive for congestion and rhinorrhea. Negative for ear pain, sinus pressure and sore throat. Eyes: Negative for pain, discharge and redness. Respiratory: Positive for cough. Negative for shortness of breath and wheezing. Cardiovascular: Negative for chest pain. Gastrointestinal: Negative for abdominal pain, diarrhea, nausea and vomiting. Genitourinary: Negative for dysuria and frequency. Musculoskeletal: Negative for arthralgias and back pain. Skin: Negative for rash and wound. Neurological: Negative for weakness and headaches. Hematological: Negative for adenopathy. All other systems reviewed and are negative. Physical Exam  Constitutional:       Appearance: He is well-developed. He is obese. HENT:      Head: Normocephalic. Nose: Congestion present.       Mouth/Throat:      Mouth: Mucous membranes are moist.   Cardiovascular: Rate and Rhythm: Normal rate and regular rhythm. Pulmonary:      Effort: Pulmonary effort is normal.      Breath sounds: Normal breath sounds and air entry. Abdominal:      General: Abdomen is flat. Bowel sounds are normal.      Palpations: Abdomen is soft. Tenderness: There is no abdominal tenderness. Musculoskeletal:      Cervical back: Full passive range of motion without pain, normal range of motion and neck supple. Skin:     General: Skin is warm. Capillary Refill: Capillary refill takes less than 2 seconds. Neurological:      General: No focal deficit present. Mental Status: He is alert and oriented to person, place, and time. Procedures     MDM  Number of Diagnoses or Management Options  Acute upper respiratory infection  Chronic obstructive pulmonary disease, unspecified COPD type ([facility]  Diagnosis management comments: Patient seen and examined. Labs and imaging were ordered. Patient not hypoxic though is significantly hypertensive though he states he did not take his blood pressure medicines today. His blood pressure medicines were ordered. Work-up was concerning for COVID-19 he was found to be negative for this. Patient was given breathing treatments he was ambulated in the department without becoming hypoxic is felt this is likely a viral upper respiratory infection. He was treated symptomatically encouraged to make sure he is taking his blood pressure medications as prescribed and that he should probably probably follow-up with his primary care doctor for discussion about whether an adjustment to be made to these medications.   Patient was comfortable plan he was discharged with outpatient follow-up.             --------------------------------------------- PAST HISTORY ---------------------------------------------  Past Medical History:  has a past medical history of Atrial fibrillation ([facility] COPD (chronic obstructive pulmonary disease) ([facility] Hypertension, Osteoarthritis, Sleep apnea, and Spinal stenosis. Past Surgical History:  has a past surgical history that includes hernia repair; Uvulectomy; Nose surgery; Tympanostomy tube placement; open treatment fracture distal tibia fibula (Left, 4/29/2018); and pr laminectomy,>2 sgmt,lumbar (N/A, 7/2/2018). Social History:  reports that he quit smoking about 7 years ago. His smoking use included cigars and cigarettes. He started smoking about 47 years ago. He has a 78.00 pack-year smoking history. He has never used smokeless tobacco. He reports current alcohol use. He reports that he does not use drugs. Family History: family history includes Cancer in his mother; Cirrhosis in his father; High Blood Pressure in his brother; Lupus in his sister; Other in his mother. The patients home medications have been reviewed. Allergies:  Ancef [cefazolin] and Bactrim [sulfamethoxazole-trimethoprim]    -------------------------------------------------- RESULTS -------------------------------------------------  Labs:  Results for orders placed or performed during the hospital encounter of 07/20/21   COVID-19, Rapid    Specimen: Nasopharyngeal Swab   Result Value Ref Range    SARS-CoV-2, NAAT Not Detected Not Detected   CBC Auto Differential   Result Value Ref Range    WBC 9.1 4.5 - 11.5 E9/L    RBC 4.74 3.80 - 5.80 E12/L    Hemoglobin 15.5 12.5 - 16.5 g/dL    Hematocrit 45.4 37.0 - 54.0 %    MCV 95.8 80.0 - 99.9 fL    MCH 32.7 26.0 - 35.0 pg    MCHC 34.1 32.0 - 34.5 %    RDW 14.1 11.5 - 15.0 fL    Platelets 158 876 - 232 E9/L    MPV 9.6 7.0 - 12.0 fL    Neutrophils % 81.5 (H) 43.0 - 80.0 %    Immature Granulocytes % 0.4 0.0 - 5.0 %    Lymphocytes % 7.7 (L) 20.0 - 42.0 %    Monocytes % 9.8 2.0 - 12.0 %    Eosinophils % 0.1 0.0 - 6.0 %    Basophils % 0.5 0.0 - 2.0 %    Neutrophils Absolute 7.43 (H) 1.80 - 7.30 E9/L    Immature Granulocytes # 0.04 E9/L    Lymphocytes Absolute 0.70 (L) 1.50 - 4.00 E9/L    Monocytes Absolute 0. 89 0.10 - 0.95 E9/L    Eosinophils Absolute 0.01 (L) 0.05 - 0.50 E9/L    Basophils Absolute 0.05 0.00 - 0.20 X2/A   Basic Metabolic Panel   Result Value Ref Range    Sodium 136 132 - 146 mmol/L    Potassium 4.3 3.5 - 5.0 mmol/L    Chloride 103 98 - 107 mmol/L    CO2 22 22 - 29 mmol/L    Anion Gap 11 7 - 16 mmol/L    Glucose 121 (H) 74 - 99 mg/dL    BUN 11 6 - 23 mg/dL    CREATININE 1.0 0.7 - 1.2 mg/dL    GFR Non-African American >60 >=60 mL/min/1.73    GFR African American >60     Calcium 9.3 8.6 - 10.2 mg/dL   Troponin   Result Value Ref Range    Troponin, High Sensitivity 10 0 - 11 ng/L   Brain Natriuretic Peptide   Result Value Ref Range    Pro-BNP 1,232 (H) 0 - 125 pg/mL   EKG 12 Lead   Result Value Ref Range    Ventricular Rate 105 BPM    Atrial Rate 110 BPM    QRS Duration 72 ms    Q-T Interval 332 ms    QTc Calculation (Bazett) 438 ms    R Axis 35 degrees    T Axis 50 degrees       Radiology:  XR CHEST PORTABLE   Final Result   No acute process. ------------------------- NURSING NOTES AND VITALS REVIEWED ---------------------------  Date / Time Roomed:  7/20/2021  2:59 PM  ED Bed Assignment:  06/06    The nursing notes within the ED encounter and vital signs as below have been reviewed. BP (!) 204/111   Pulse 104   Temp 98.8 °F (37.1 °C) (Temporal)   Resp 20   Ht 6' 2\" (1.88 m)   Wt (!) 340 lb (154.2 kg)   SpO2 93%   BMI 43.65 kg/m²   Oxygen Saturation Interpretation: Normal      ------------------------------------------ PROGRESS NOTES ------------------------------------------  I have spoken with the patient and discussed todays results, in addition to providing specific details for the plan of care and counseling regarding the diagnosis and prognosis. Their questions are answered at this time and they are agreeable with the plan. I discussed at length with them reasons for immediate return here for re evaluation.  They will followup with their primary care physician by calling their office on Monday.      --------------------------------- ADDITIONAL PROVIDER NOTES ---------------------------------  At this time the patient is without objective evidence of an acute process requiring hospitalization or inpatient management. They have remained hemodynamically stable throughout their entire ED visit and are stable for discharge with outpatient follow-up. The plan has been discussed in detail and they are aware of the specific conditions for emergent return, as well as the importance of follow-up. Discharge Medication List as of 7/20/2021  6:28 PM      START taking these medications    Details   guaiFENesin (MUCINEX) 600 MG extended release tablet Take 1 tablet by mouth 2 times daily for 15 days, Disp-30 tablet, R-0Print      benzonatate (TESSALON PERLES) 100 MG capsule Take 1 capsule by mouth 3 times daily as needed for Cough, Disp-21 capsule, R-0Print      ipratropium (ATROVENT) 0.03 % nasal spray 2 sprays by Each Nostril route every 12 hours, Disp-1 Bottle, R-3Print             Diagnosis:  1. Acute upper respiratory infection    2. Chronic obstructive pulmonary disease, unspecified COPD type ([facility]        Disposition:  Patient's disposition: Discharge to home  Patient's condition is stable.        Susu Ulrich DO  07/24/21 [id number]

## 2021-07-20 NOTE — ED NOTES
FIRST PROVIDER CONTACT ASSESSMENT NOTE                                                                                                Department of Emergency Medicine                                                      First Provider Note  21  1:09 PM EDT  NAME: Fide Tidwell  : 1955  MRN: [id number]    Chief Complaint: Shortness of Breath (Started a few days ago, getting worse 94% RA HR 74) and Nasal Congestion (patient is on C-pap and did not get better last night with it after colonoscopy)      History of Present Illness:   Fide Tidwell is a 76 y.o. male who presents to the ED for SOB, cough, wheezing x 2 days. Focused Physical Exam:  VS:    ED Triage Vitals   BP Temp Temp src Pulse Resp SpO2 Height Weight   -- -- -- -- -- -- -- --        General: Alert and in no apparent distress. Medical History:  has a past medical history of Atrial fibrillation ([facility] [address] COPD (chronic obstructive pulmonary disease) ([facility] [address] Hypertension, Osteoarthritis, Sleep apnea, and Spinal stenosis. Surgical History:  has a past surgical history that includes hernia repair; Uvulectomy; Nose surgery; Tympanostomy tube placement; open treatment fracture distal tibia fibula (Left, 2018); and pr laminectomy,>2 sgmt,lumbar (N/A, 2018). Social History:  reports that he quit smoking about 7 years ago. His smoking use included cigars and cigarettes. He started smoking about 47 years ago. He has a 78.00 pack-year smoking history. He has never used smokeless tobacco. He reports current alcohol use. He reports that he does not use drugs. Family History: family history includes Cancer in his mother; Cirrhosis in his father; High Blood Pressure in his brother; Lupus in his sister; Other in his mother. Allergies:  Ancef [cefazolin] and Bactrim [sulfamethoxazole-trimethoprim]     Initial Plan of Care:  Initiate Treatment-Testing, Proceed toTreatment Area When Bed Available for ED Attending/MLP to Continue Care    -------------------------------------------------END OF FIRST PROVIDER CONTACT ASSESSMENT NOTE--------------------------------------------------------  Electronically signed by HONEY Mendes   DD: 7/20/21       HONEY Luna  07/20/21 [id number]

## 2021-07-21 LAB
EKG ATRIAL RATE: 110 BPM
EKG Q-T INTERVAL: 332 MS
EKG QRS DURATION: 72 MS
EKG QTC CALCULATION (BAZETT): 438 MS
EKG R AXIS: 35 DEGREES
EKG T AXIS: 50 DEGREES
EKG VENTRICULAR RATE: 105 BPM

## 2021-07-21 PROCEDURE — 93010 ELECTROCARDIOGRAM REPORT: CPT | Performed by: INTERNAL MEDICINE

## 2021-08-03 PROBLEM — I50.22 CHRONIC SYSTOLIC CONGESTIVE HEART FAILURE (HCC): Status: ACTIVE | Noted: 2021-08-03

## 2021-12-10 ENCOUNTER — HOSPITAL ENCOUNTER (OUTPATIENT)
Dept: GENERAL RADIOLOGY | Age: 66
Discharge: HOME | End: 2021-12-12
Payer: MEDICARE

## 2021-12-10 ENCOUNTER — HOSPITAL ENCOUNTER (OUTPATIENT)
Age: 66
Discharge: HOME | End: 2021-12-12
Payer: MEDICARE

## 2021-12-10 DIAGNOSIS — R07.9 CHEST PAIN, UNSPECIFIED TYPE: ICD-10-CM

## 2021-12-10 PROCEDURE — 71046 X-RAY EXAM CHEST 2 VIEWS: CPT

## 2022-02-11 PROBLEM — I50.22 CHRONIC SYSTOLIC CONGESTIVE HEART FAILURE (HCC): Status: RESOLVED | Noted: 2021-08-03 | Resolved: 2022-02-11

## 2022-02-14 ENCOUNTER — TELEPHONE (OUTPATIENT)
Dept: ADMINISTRATIVE | Age: 67
End: 2022-02-14

## 2022-02-14 NOTE — TELEPHONE ENCOUNTER
Wife calling to schedule a yearly for pt in April with Dr. Roni Mcclellan - [facility] pt) - please return her call to schedule when the April schedule is available. Thank you.

## 2022-06-01 ENCOUNTER — OFFICE VISIT (OUTPATIENT)
Dept: CARDIOLOGY CLINIC | Age: 67
End: 2022-06-01
Payer: MEDICARE

## 2022-06-01 VITALS
RESPIRATION RATE: 20 BRPM | HEART RATE: 72 BPM | SYSTOLIC BLOOD PRESSURE: 114 MMHG | DIASTOLIC BLOOD PRESSURE: 70 MMHG | WEIGHT: 315 LBS | HEIGHT: 74 IN | BODY MASS INDEX: 40.43 KG/M2

## 2022-06-01 DIAGNOSIS — F10.10 ALCOHOL ABUSE: ICD-10-CM

## 2022-06-01 DIAGNOSIS — J43.2 CENTRILOBULAR EMPHYSEMA (HCC): ICD-10-CM

## 2022-06-01 DIAGNOSIS — R53.83 OTHER FATIGUE: ICD-10-CM

## 2022-06-01 DIAGNOSIS — I20.8 OTHER FORMS OF ANGINA PECTORIS (HCC): ICD-10-CM

## 2022-06-01 DIAGNOSIS — R06.09 DOE (DYSPNEA ON EXERTION): ICD-10-CM

## 2022-06-01 DIAGNOSIS — E66.01 CLASS 3 SEVERE OBESITY DUE TO EXCESS CALORIES WITH SERIOUS COMORBIDITY AND BODY MASS INDEX (BMI) OF 40.0 TO 44.9 IN ADULT (HCC): ICD-10-CM

## 2022-06-01 DIAGNOSIS — I10 ESSENTIAL HYPERTENSION: ICD-10-CM

## 2022-06-01 DIAGNOSIS — I48.20 CHRONIC ATRIAL FIBRILLATION (HCC): Primary | ICD-10-CM

## 2022-06-01 DIAGNOSIS — G47.33 OSA (OBSTRUCTIVE SLEEP APNEA): ICD-10-CM

## 2022-06-01 DIAGNOSIS — R94.31 ABNORMAL EKG: ICD-10-CM

## 2022-06-01 PROCEDURE — 1123F ACP DISCUSS/DSCN MKR DOCD: CPT | Performed by: INTERNAL MEDICINE

## 2022-06-01 PROCEDURE — 3017F COLORECTAL CA SCREEN DOC REV: CPT | Performed by: INTERNAL MEDICINE

## 2022-06-01 PROCEDURE — G8417 CALC BMI ABV UP PARAM F/U: HCPCS | Performed by: INTERNAL MEDICINE

## 2022-06-01 PROCEDURE — 3023F SPIROM DOC REV: CPT | Performed by: INTERNAL MEDICINE

## 2022-06-01 PROCEDURE — G8427 DOCREV CUR MEDS BY ELIG CLIN: HCPCS | Performed by: INTERNAL MEDICINE

## 2022-06-01 PROCEDURE — 99214 OFFICE O/P EST MOD 30 MIN: CPT | Performed by: INTERNAL MEDICINE

## 2022-06-01 PROCEDURE — 1036F TOBACCO NON-USER: CPT | Performed by: INTERNAL MEDICINE

## 2022-06-01 PROCEDURE — 93000 ELECTROCARDIOGRAM COMPLETE: CPT | Performed by: INTERNAL MEDICINE

## 2022-06-01 RX ORDER — ACETAMINOPHEN 160 MG
TABLET,DISINTEGRATING ORAL
COMMUNITY

## 2022-06-01 RX ORDER — MULTIVIT WITH MINERALS/LUTEIN
250 TABLET ORAL DAILY
COMMUNITY

## 2022-06-01 NOTE — PROGRESS NOTES
Out Patient CARDIOLOGY Follow Up Visit    Name: Gifty Santamaria    Age: 78 y.o. Date of Admission: No admission date for patient encounter. Date of Service: 6/3/2022    Reason for Consultation:   Chief Complaint   Patient presents with    Atrial Fibrillation          Referring Physician: No admitting provider for patient encounter. History of Present Illness: 54-year-old male with history of tobacco abuse quit in 2014, history of alcohol abuse, duodenal ulcer, morbid obesity, hypertension, hearing loss, history of bilateral carpal tunnel syndrome, COPD, chronic back pain, atrial fibrillation and chronic kidney disease who present for follow-up visit. On today's visit, he reports atypical chest pain, dyspnea on exertion, and fatigue with activities and he has baseline abnormal EKG and subsequently Lexiscan myocardial perfusion stress test and echocardiogram are arranged for further evaluation. Routine blood work, lipid profile and TSH were also arrange. BNP also arranged. Medical History:  1. History of cigarette abuse 2 ppd x 39 years quit in 2014.  2. ETOH abuse up to 12 beers a day  drinking 3-4 beers a day 2018 and 2019.  3. Duodenal ulcer in 1970's. 4. Morbid obesity.  BMI  - 03/2020.  5. HTN. 6. Hearing loss. 7. Hx LE cellulitis. 8. Bilateral carpal tunnel syndrome. 9. 07/2015 Bilateral serous OM s/p bilateral tympanostomy and tubes. 10. 07/2015 Nasal endoscopy and biopsy of nasopharyngeal lesion. 11. MARISEL non-complaint with C-pap--> With repeat sleep study done 3/8/2016 showing moderate sleep apnea syndrome with significant oxygen  desaturation.   12. COPD Gold Stage II.  13. 04/30/2018, Mechanical fall resulting in left displaced lateral mallelous and deltoid ligament injury s/p  ORIF L lateral malleolus. 14. s/p Lumbar laminectomy.  07/02/2018 Hernia repair, uvulectomy 1990's, bilateral cataract   15.  Hospitalized 10/14/2019 through 10/18/2019 for COPD exacerbation and atrial fibrillation with rapid ventricular response.  He was anticoagulated for [id number]-TRCu= 2.  proBNP was only 434 although it was felt that this may have been depressed by obesity.  He was diuresed for 4 L.  16. Echo 10/14/2019: Biplane contrast EF 61%.  Left atrium mildly dilated.  AF.  RVSP 31.  No significant valve abnormality  17.  Hospitalized 12/07/2019 through 12/13/2019. Radha Flowers had a left chest wall abscess/cellulitis which was incised and drained.  He was seen by nephrology for BUN 27, creatinine 1.6 and GFR 24.  This was attributed to intravascular volume depletion primarily.           Past Medical History:  Past Medical History:   Diagnosis Date    Atrial fibrillation (HCC)     COPD (chronic obstructive pulmonary disease) (HCC)     Hypertension     Osteoarthritis     Sleep apnea     REFUSES CPAP    Spinal stenosis        Past Surgical History:  Past Surgical History:   Procedure Laterality Date    HERNIA REPAIR      NOSE SURGERY      OPEN TREATMENT FRACTURE DISTAL TIBIA FIBULA Left 4/29/2018    left ANKLE OPEN REDUCTION INTERNAL FIXATION performed by Concha Chen MD at [facility] LAMINECTOMY,>2 [facility] N/A 7/2/2018    L3- S1 LUMBAR LAMINECTOMY performed by Audrey España MD at [facility] TYMPANOSTOMY TUBE PLACEMENT      UVULECTOMY         Family History:  Family History   Problem Relation Age of Onset    Cirrhosis Father     Lupus Sister    Leigh See Cancer Mother     Other Mother         copd    High Blood Pressure Brother        Social History:  Social History     Socioeconomic History    Marital status:      Spouse name: Not on file    Number of children: 3    Years of education: Not on file    Highest education level: Not on file   Occupational History    Occupation:    Tobacco Use    Smoking status: Former Smoker     Packs/day: 2.00     Years: 39.00     Pack years: 78.00     Types: Cigars, Cigarettes     Start date: 1974     Quit date: 1/25/2014     Years since quitting: 8. 3    Smokeless tobacco: Never Used   Vaping Use    Vaping Use: Never used    Passive vaping exposure: Yes   Substance and Sexual Activity    Alcohol use: Yes     Comment: 3-4 daily    Drug use: No    Sexual activity: Yes     Partners: Female   Other Topics Concern    Not on file   Social History Narrative    Not on file     Social Determinants of Health     Financial Resource Strain:     Difficulty of Paying Living Expenses: Not on file   Food Insecurity:     Worried About Running Out of Food in the Last Year: Not on file    Mike of Food in the Last Year: Not on file   Transportation Needs:     Lack of Transportation (Medical): Not on file    Lack of Transportation (Non-Medical): Not on file   Physical Activity:     Days of Exercise per Week: Not on file    Minutes of Exercise per Session: Not on file   Stress:     Feeling of Stress : Not on file   Social Connections:     Frequency of Communication with Friends and Family: Not on file    Frequency of Social Gatherings with Friends and Family: Not on file    Attends Gnosticism Services: Not on file    Active Member of [address] [place] [address] or Organizations: Not on file    Attends Club or Organization Meetings: Not on file    Marital Status: Not on file   Intimate Partner Violence:     Fear of Current or Ex-Partner: Not on file    Emotionally Abused: Not on file    Physically Abused: Not on file    Sexually Abused: Not on file   Housing Stability:     Unable to Pay for Housing in the Last Year: Not on file    Number of [place] in the Last Year: Not on file    Unstable Housing in the Last Year: Not on file       Allergies: Allergies   Allergen Reactions    Ancef [Cefazolin]     Bactrim [Sulfamethoxazole-Trimethoprim] Rash       Home Medications:  Prior to Admission medications    Medication Sig Start Date End Date Taking?  Authorizing Provider   Cholecalciferol (VITAMIN D3) 50 MCG (2000 UT) CAPS Take by mouth   Yes Historical Provider, MD Ascorbic Acid (VITAMIN C) 250 MG tablet Take 250 mg by mouth daily   Yes Historical Provider, MD   albuterol sulfate HFA (PROAIR HFA) 108 (90 Base) MCG/ACT inhaler USE 2 INHALATIONS EVERY 4 HOURS AS NEEDED 3/24/22  Yes Vitaliy Valencia DO   NONFORMULARY Zinc and vitamin c and vitamin d   otc for covid   Yes Historical Provider, MD   metoprolol tartrate (LOPRESSOR) 25 MG tablet Take 1 tablet by mouth 2 times daily 2/11/22 6/1/22 Yes Ana Matamoros DO   irbesartan (AVAPRO) 300 MG tablet Take 1 tablet by mouth nightly 2/11/22 6/1/22 Yes Ana Matamoros, DO   albuterol (PROVENTIL) (2.5 MG/3ML) 0.083% nebulizer solution TAKE 3 MLS BY NEBULIZATION EVERY 6 HOURS AS NEEDED DX: COPD J44.9 1/3/22  Yes Vitaliy Valencia,    SYMBICORT 160-4.5 MCG/ACT AERO Inhale 2 puffs into the lungs 2 times daily 12/20/21  Yes Sukhdeep Valencia,    Probiotic Product (PROBIOTIC ADVANCED) CAPS Take by mouth   Yes Historical Provider, MD   apixaban (ELIQUIS) 5 MG TABS tablet Take 1 tablet by mouth 2 times daily 10/18/19  Yes Ana Matamoros DO   Multiple Vitamins-Minerals (THERAPEUTIC MULTIVITAMIN-MINERALS) tablet Take 1 tablet by mouth daily   Yes Historical Provider, MD       Current Medications:  Current Outpatient Medications   Medication Sig Dispense Refill    Cholecalciferol (VITAMIN D3) 50 MCG (2000 UT) CAPS Take by mouth      Ascorbic Acid (VITAMIN C) 250 MG tablet Take 250 mg by mouth daily      albuterol sulfate HFA (PROAIR HFA) 108 (90 Base) MCG/ACT inhaler USE 2 INHALATIONS EVERY 4 HOURS AS NEEDED 18 g 5    NONFORMULARY Zinc and vitamin c and vitamin d   otc for covid      metoprolol tartrate (LOPRESSOR) 25 MG tablet Take 1 tablet by mouth 2 times daily 180 tablet 1    irbesartan (AVAPRO) 300 MG tablet Take 1 tablet by mouth nightly 90 tablet 1    albuterol (PROVENTIL) (2.5 MG/3ML) 0.083% nebulizer solution TAKE 3 MLS BY NEBULIZATION EVERY 6 HOURS AS NEEDED DX: COPD J44.9 300 mL 5    SYMBICORT 160-4.5 MCG/ACT AERO Inhale 2 puffs into the lungs 2 times daily 30.6 g 3    Probiotic Product (PROBIOTIC ADVANCED) CAPS Take by mouth      apixaban (ELIQUIS) 5 MG TABS tablet Take 1 tablet by mouth 2 times daily 60 tablet 2    Multiple Vitamins-Minerals (THERAPEUTIC MULTIVITAMIN-MINERALS) tablet Take 1 tablet by mouth daily       Current Facility-Administered Medications   Medication Dose Route Frequency Provider Last Rate Last Admin    regadenoson (LEXISCAN) injection 0.4 mg  0.4 mg IntraVENous ONCE PRN Augie Wade MD        perflutren lipid microspheres (DEFINITY) injection 1.65 mg  1.5 mL IntraVENous ONCE PRN Augie Wade MD                 Review of Systems:   Cardiac: As per HPI  General: Denies fever or chills  Pulmonary: As per HPI  HEENT: Denies runny nose  GI: No complaints  : No complaints  Endocrine: Denies night sweats  Musculoskeletal: No complaints  Skin: Dry skin  Neuro: No complaints  Psych: Denies depression    Physical Exam:  /70   Pulse 72   Resp 20   Ht 6' 2\" (1.88 m)   Wt (!) 384 lb 4.8 oz (174.3 kg)   BMI 49.34 kg/m²   Wt Readings from Last 3 Encounters:   06/01/22 (!) 384 lb 4.8 oz (174.3 kg)   02/11/22 (!) 370 lb (167.8 kg)   08/03/21 (!) 348 lb (157.9 kg)       Appearance: Alert and oriented x3 not in acute distress. Skin: Dry skin  Head: Atraumatic  Eyes: Intact extraocular muscles   ENMT: Mucous membranes are moist  Neck: Supple  Lungs: Clear to auscultation  Cardiac: Normal S1 and S2  Abdomen: Protuberant  Extremities: Intact range of motion  Neurologic: No focal neurological deficits  Peripheral Pulses: 2+ peripheral pulses      Laboratory Tests:  No results for input(s): NA, K, CL, CO2, BUN, CREATININE, GLUCOSE, CALCIUM in the last 72 hours. Lab Results   Component Value Date    MG 1.9 12/13/2019    MG 2.0 12/12/2019    MG 2.0 12/11/2019     No results for input(s): ALKPHOS, ALT, AST, PROT, BILITOT, BILIDIR, LABALBU in the last 72 hours.   No results for input(s): WBC, RBC, HGB, HCT, MCV, MCH, MCHC, RDW, PLT, MPV in the last 72 hours. Lab Results   Component Value Date    CKTOTAL 66 10/19/2019    CKMB 2.1 10/19/2019    TROPONINI <0.01 06/05/2020    TROPONINI <0.01 12/07/2019    TROPONINI <0.01 10/20/2019     No results for input(s): CKTOTAL, CKMB, CKMBINDEX, TROPHS in the last 72 hours. Lab Results   Component Value Date    INR 2.2 12/07/2019    INR 0.9 06/25/2018    INR 1.0 04/29/2018    PROTIME 26.0 (H) 12/07/2019    PROTIME 10.3 06/25/2018    PROTIME 11.3 04/29/2018     Lab Results   Component Value Date    TSH 0.285 10/20/2019    TSH 0.511 10/15/2019     Lab Results   Component Value Date    LABA1C 5.9 (H) 10/15/2019     No results found for: EAG  Lab Results   Component Value Date    CHOL 239 (H) 05/03/2021    CHOL 189 10/15/2019     Lab Results   Component Value Date    TRIG 126 05/03/2021    TRIG 86 10/15/2019     Lab Results   Component Value Date    HDL 74 (H) 05/03/2021    HDL 89 10/15/2019     Lab Results   Component Value Date    LDLCALC 140 (H) 05/03/2021    [address] 10/15/2019     Lab Results   Component Value Date    LABVLDL 17 10/15/2019     Lab Results   Component Value Date    CHOLHDLRATIO 3.2 05/03/2021     No results for input(s): PROBNP in the last 72 hours. Cardiac Tests:  EKG reviewed (EKG date: Atrial fibrillation, 72 bpm, nonspecific ST-T wave changes):      Echocardiogram reviewed: 2019  Summary   Ejection fraction contrast biplane =61%. The left atrium is moderately dilated. Atrial fibrillation   Normal mitral valve structure and function. The aortic valve appears mildly sclerotic. No pulmonary hypertension    Stress test reviewed:      Cardiac catheterization reviewed:     CXR reviewed:      The 10-year ASCVD risk score (Jazz Perez et al., 2013) is: 11.1%    Values used to calculate the score:      Age: 78 years      Sex: Male      Is Non- : No      Diabetic: No      Tobacco smoker: No      Systolic Blood Pressure: 269 mmHg Is BP treated: No      HDL Cholesterol: 74 mg/dL      Total Cholesterol: 239 mg/dL    ASSESSMENT / PLAN:    1. Chronic atrial fibrillation (HCC)  - EKG 12 lead  - ECHO COMPLETE; Future  - CBC with Auto Differential; Future  - Comprehensive Metabolic Panel; Future  - Lipid Panel; Future  - CBC with Auto Differential; Future  - TSH; Future  - Lipid Panel; Future  - Brain Natriuretic Peptide; Future  - XR CHEST (2 VW); Future  - TSH; Future    2. Other forms of angina pectoris ([address]    3. Abnormal EKG  - NM Cardiac Stress Test Nuclear Imaging; Future  - Cardiac Stress Test - w/Pharm; Future  - ECHO COMPLETE; Future  - CBC with Auto Differential; Future  - Comprehensive Metabolic Panel; Future  - Lipid Panel; Future  - CBC with Auto Differential; Future  - TSH; Future  - Lipid Panel; Future  - Brain Natriuretic Peptide; Future  - XR CHEST (2 VW); Future    4. GONZALEZ (dyspnea on exertion)  - NM Cardiac Stress Test Nuclear Imaging; Future  - Cardiac Stress Test - w/Pharm; Future  - ECHO COMPLETE; Future  - CBC with Auto Differential; Future  - Comprehensive Metabolic Panel; Future  - Lipid Panel; Future  - CBC with Auto Differential; Future  - TSH; Future  - Lipid Panel; Future  - Brain Natriuretic Peptide; Future  - XR CHEST (2 VW); Future    5. Other fatigue  - NM Cardiac Stress Test Nuclear Imaging; Future  - Cardiac Stress Test - w/Pharm; Future  - ECHO COMPLETE; Future  - CBC with Auto Differential; Future  - Comprehensive Metabolic Panel; Future  - Lipid Panel; Future  - CBC with Auto Differential; Future  - TSH; Future  - Lipid Panel; Future  - Brain Natriuretic Peptide; Future    6. Alcohol abuse  - CBC with Auto Differential; Future  - Comprehensive Metabolic Panel; Future  - Lipid Panel; Future  - CBC with Auto Differential; Future  - TSH; Future  - Lipid Panel; Future  - Brain Natriuretic Peptide; Future    7. MARISEL (obstructive sleep apnea)  Follow-up with your PCP and arrange for sleep apnea evaluation.     8. Class 3 severe obesity due to excess calories with serious comorbidity and body mass index (BMI) of 40.0 to 44.9 in adult (HCC)  Weight loss was recommended    9. Essential hypertension  Follow low-salt diet  10. Centrilobular emphysema ([address]  Follow-up with your pulmonologist      Follow in Return in about 3 months (around 9/1/2022). Thank you for allowing me to participate in your patient's care. Please feel free to contact me if you have any questions or concerns.     Nicho Brown MD  [facility]) Cardiology

## 2022-06-24 ENCOUNTER — HOSPITAL ENCOUNTER (OUTPATIENT)
Age: 67
Discharge: HOME | End: 2022-06-26
Payer: MEDICARE

## 2022-06-24 ENCOUNTER — HOSPITAL ENCOUNTER (OUTPATIENT)
Dept: GENERAL RADIOLOGY | Age: 67
Discharge: HOME | End: 2022-06-26
Payer: MEDICARE

## 2022-06-24 ENCOUNTER — HOSPITAL ENCOUNTER (OUTPATIENT)
Age: 67
Discharge: HOME | End: 2022-06-24
Payer: MEDICARE

## 2022-06-24 DIAGNOSIS — F10.10 ALCOHOL ABUSE: ICD-10-CM

## 2022-06-24 DIAGNOSIS — R06.09 DOE (DYSPNEA ON EXERTION): ICD-10-CM

## 2022-06-24 DIAGNOSIS — R53.83 OTHER FATIGUE: ICD-10-CM

## 2022-06-24 DIAGNOSIS — G47.33 OSA (OBSTRUCTIVE SLEEP APNEA): ICD-10-CM

## 2022-06-24 DIAGNOSIS — R94.31 ABNORMAL EKG: ICD-10-CM

## 2022-06-24 DIAGNOSIS — I48.20 CHRONIC ATRIAL FIBRILLATION (HCC): ICD-10-CM

## 2022-06-24 DIAGNOSIS — E66.01 CLASS 3 SEVERE OBESITY DUE TO EXCESS CALORIES WITH SERIOUS COMORBIDITY AND BODY MASS INDEX (BMI) OF 40.0 TO 44.9 IN ADULT (HCC): ICD-10-CM

## 2022-06-24 LAB
ALBUMIN SERPL-MCNC: 4.2 G/DL (ref 3.5–5.2)
ALP BLD-CCNC: 119 U/L (ref 40–129)
ALT SERPL-CCNC: 137 U/L (ref 0–40)
ANION GAP SERPL CALCULATED.3IONS-SCNC: 14 MMOL/L (ref 7–16)
AST SERPL-CCNC: 85 U/L (ref 0–39)
BASOPHILS ABSOLUTE: 0.05 E9/L (ref 0–0.2)
BASOPHILS RELATIVE PERCENT: 0.6 % (ref 0–2)
BILIRUB SERPL-MCNC: 0.4 MG/DL (ref 0–1.2)
BUN BLDV-MCNC: 15 MG/DL (ref 6–23)
CALCIUM SERPL-MCNC: 9.3 MG/DL (ref 8.6–10.2)
CHLORIDE BLD-SCNC: 104 MMOL/L (ref 98–107)
CHOLESTEROL, TOTAL: 201 MG/DL (ref 0–199)
CO2: 22 MMOL/L (ref 22–29)
CREAT SERPL-MCNC: 1.1 MG/DL (ref 0.7–1.2)
EOSINOPHILS ABSOLUTE: 0.24 E9/L (ref 0.05–0.5)
EOSINOPHILS RELATIVE PERCENT: 2.9 % (ref 0–6)
GFR AFRICAN AMERICAN: >60
GFR NON-AFRICAN AMERICAN: >60 ML/MIN/1.73
GLUCOSE BLD-MCNC: 104 MG/DL (ref 74–99)
HCT VFR BLD CALC: 44.8 % (ref 37–54)
HDLC SERPL-MCNC: 89 MG/DL
HEMOGLOBIN: 14.9 G/DL (ref 12.5–16.5)
IMMATURE GRANULOCYTES #: 0.04 E9/L
IMMATURE GRANULOCYTES %: 0.5 % (ref 0–5)
LDL CHOLESTEROL CALCULATED: 96 MG/DL (ref 0–99)
LYMPHOCYTES ABSOLUTE: 2.21 E9/L (ref 1.5–4)
LYMPHOCYTES RELATIVE PERCENT: 26.4 % (ref 20–42)
MCH RBC QN AUTO: 32.8 PG (ref 26–35)
MCHC RBC AUTO-ENTMCNC: 33.3 % (ref 32–34.5)
MCV RBC AUTO: 98.7 FL (ref 80–99.9)
MONOCYTES ABSOLUTE: 0.71 E9/L (ref 0.1–0.95)
MONOCYTES RELATIVE PERCENT: 8.5 % (ref 2–12)
NEUTROPHILS ABSOLUTE: 5.11 E9/L (ref 1.8–7.3)
NEUTROPHILS RELATIVE PERCENT: 61.1 % (ref 43–80)
PDW BLD-RTO: 13.9 FL (ref 11.5–15)
PLATELET # BLD: 227 E9/L (ref 130–450)
PMV BLD AUTO: 9.9 FL (ref 7–12)
POTASSIUM SERPL-SCNC: 4.3 MMOL/L (ref 3.5–5)
PRO-BNP: 586 PG/ML (ref 0–125)
RBC # BLD: 4.54 E12/L (ref 3.8–5.8)
SODIUM BLD-SCNC: 140 MMOL/L (ref 132–146)
TOTAL PROTEIN: 7 G/DL (ref 6.4–8.3)
TRIGL SERPL-MCNC: 79 MG/DL (ref 0–149)
TSH SERPL DL<=0.05 MIU/L-ACNC: 1.33 UIU/ML (ref 0.27–4.2)
VLDLC SERPL CALC-MCNC: 16 MG/DL
WBC # BLD: 8.4 E9/L (ref 4.5–11.5)

## 2022-06-24 PROCEDURE — 36415 COLL VENOUS BLD VENIPUNCTURE: CPT

## 2022-06-24 PROCEDURE — 83880 ASSAY OF NATRIURETIC PEPTIDE: CPT

## 2022-06-24 PROCEDURE — 80061 LIPID PANEL: CPT

## 2022-06-24 PROCEDURE — 84443 ASSAY THYROID STIM HORMONE: CPT

## 2022-06-24 PROCEDURE — 80053 COMPREHEN METABOLIC PANEL: CPT

## 2022-06-24 PROCEDURE — 71046 X-RAY EXAM CHEST 2 VIEWS: CPT

## 2022-06-24 PROCEDURE — 85025 COMPLETE CBC W/AUTO DIFF WBC: CPT

## 2022-06-27 ENCOUNTER — TELEPHONE (OUTPATIENT)
Dept: CARDIOLOGY CLINIC | Age: 67
End: 2022-06-27

## 2022-06-27 NOTE — TELEPHONE ENCOUNTER
----- Message from Eva Delgado MD sent at 6/27/2022  7:54 AM EDT -----  Liver enzymes are elevated. Please follow-up with your primary care physician for further evaluation and management.   If patient uses alcohol please tell patient to stop

## 2022-07-07 ENCOUNTER — TELEPHONE (OUTPATIENT)
Dept: CARDIOLOGY | Age: 67
End: 2022-07-07

## 2022-07-13 ENCOUNTER — TELEPHONE (OUTPATIENT)
Dept: CARDIOLOGY | Age: 67
End: 2022-07-13

## 2022-07-13 NOTE — TELEPHONE ENCOUNTER
Spoke w/ pt's wife, Mauricio Grullon, to confirm stress for Friday 7/15/22. Instructions given included: nothing to eat/drink after midnight except sips of water, hold all forms of caffeine for 12 hrs prior to test.  All questions answered.

## 2022-07-20 ENCOUNTER — TELEPHONE (OUTPATIENT)
Dept: CARDIOLOGY | Age: 67
End: 2022-07-20

## 2022-07-20 NOTE — TELEPHONE ENCOUNTER
Spoke with patient's wife and confirmed Lexiscan stress test appointment on July 22, 2022 at 0930. Instructions for test and COVID-19 preprocedure checklist reviewed with patient's wife, questions answered.

## 2022-07-22 ENCOUNTER — HOSPITAL ENCOUNTER (OUTPATIENT)
Dept: CARDIOLOGY | Age: 67
Discharge: HOME | End: 2022-07-22
Payer: MEDICARE

## 2022-07-22 VITALS
SYSTOLIC BLOOD PRESSURE: 126 MMHG | DIASTOLIC BLOOD PRESSURE: 82 MMHG | HEIGHT: 74 IN | RESPIRATION RATE: 20 BRPM | WEIGHT: 315 LBS | HEART RATE: 70 BPM | BODY MASS INDEX: 40.43 KG/M2

## 2022-07-22 DIAGNOSIS — R94.31 ABNORMAL EKG: ICD-10-CM

## 2022-07-22 DIAGNOSIS — R53.83 OTHER FATIGUE: ICD-10-CM

## 2022-07-22 DIAGNOSIS — R06.09 DOE (DYSPNEA ON EXERTION): ICD-10-CM

## 2022-07-22 PROCEDURE — 78452 HT MUSCLE IMAGE SPECT MULT: CPT

## 2022-07-22 PROCEDURE — 3430000000 HC RX DIAGNOSTIC RADIOPHARMACEUTICAL: Performed by: INTERNAL MEDICINE

## 2022-07-22 PROCEDURE — 6360000002 HC RX W HCPCS: Performed by: INTERNAL MEDICINE

## 2022-07-22 PROCEDURE — 2580000003 HC RX 258: Performed by: INTERNAL MEDICINE

## 2022-07-22 PROCEDURE — 93017 CV STRESS TEST TRACING ONLY: CPT

## 2022-07-22 PROCEDURE — A9500 TC99M SESTAMIBI: HCPCS | Performed by: INTERNAL MEDICINE

## 2022-07-22 RX ORDER — SODIUM CHLORIDE 0.9 % (FLUSH) 0.9 %
10 SYRINGE (ML) INJECTION PRN
Status: DISCONTINUED | OUTPATIENT
Start: 2022-07-22 | End: 2022-07-23 | Stop reason: HOSPADM

## 2022-07-22 RX ADMIN — SODIUM CHLORIDE, PRESERVATIVE FREE 10 ML: 5 INJECTION INTRAVENOUS at 09:50

## 2022-07-22 RX ADMIN — SODIUM CHLORIDE, PRESERVATIVE FREE 10 ML: 5 INJECTION INTRAVENOUS at 11:52

## 2022-07-22 RX ADMIN — SODIUM CHLORIDE, PRESERVATIVE FREE 10 ML: 5 INJECTION INTRAVENOUS at 11:51

## 2022-07-22 RX ADMIN — REGADENOSON 0.4 MG: 0.08 INJECTION, SOLUTION INTRAVENOUS at 11:51

## 2022-07-22 RX ADMIN — Medication 32 MILLICURIE: at 11:51

## 2022-07-22 RX ADMIN — Medication 10.5 MILLICURIE: at 09:50

## 2022-07-22 NOTE — PROCEDURES
[address] [facility] [address] [phone]                Pharmacologic Stress Nuclear Gated SPECT Study    Name: Adrián Shi Account Number: [de-identified]    :  1955          Sex: male         Date of Study:  2022    Height: 6' 2\" (188 cm)         Weight: (!) 384 lb (174.2 kg)     Ordering Provider: Christina Coy MD          PCP: Dione De Santiago DO      Cardiologist: Christina Coy MD             Interpreting Physician: Kunal Lyn MD  _________________________________________________________________________________    Indication:   Detecting the presence and location of coronary artery disease    Clinical History:   Patient has no known history of coronary artery disease. Resting ECG:    AF 70 bpm. Normal axis/intervals. No ST/T changes. Septal infarct. Procedure:   Pharmacologic stress testing was performed with regadenoson 0.4 mg for 15 seconds. The heart rate was 70 at baseline and claire to 103 beats during the infusion. This corresponds to 67% of maximum predicted heart rate. The blood pressure at baseline was 126/82 and blood pressure at the end of infusion was 114/80. Blood pressure response was normal during the stress procedure. The patient experienced shortness of breathe during the infusion. ECG during the infusion did not change. IMAGING: Myocardial perfusion imaging was performed at rest 30-35 minutes following the intravenous injection of 10.5 mCi of (Tc-Sestamibi) followed by 10 ml of Normal Saline. As per infusion protocol, the patient was injected intravenously with 32.0 mCi of (Tc-Sestamibi) followed by 10 ml of Normal Saline. Gated post-stress tomographic imaging was performed 20-25 minutes after stress. FINDINGS: The overall quality of the study was fair. Left ventricular cavity size was noted to be enlarged on both rest and stress studies.     Rotational analog analysis demonstrated soft tissue diaphragmatic attenuation. The gated SPECT stress imaging in the short, vertical long, and horizontal long axis demonstrated a fixed inferior defect with normal wall motion suggestive of diaphragmatic attenuation artifact. Otherwise no fixed or reversible defects. Gated SPECT left ventricular ejection fraction was calculated to be 70%, with normal myocardial thickening and wall motion.  mL. TID ratio 1.01. Impression:    Technically limited study due to body habitus. ECG during the infusion did not change, with baseline atrial fibrillation. The myocardial perfusion imaging was normal with attenuation artifact. Overall left ventricular systolic function was normal without regional wall motion abnormalities. Low risk general pharmacologic stress test.    Thank you for sending your patient to this [employer].      Electronically signed by Justice Welsh MD on 7/22/22 at 6:43 PM EDT

## 2022-07-22 NOTE — DISCHARGE INSTRUCTIONS
[id number] [facility] Lab      Instructions to Patients    The following are the instructions for patients who have had a procedure in our office today. Patient name: Leandro Paiz    Radionuclide Activity: 40mCi of 99mTc-Sestamibi    Date Administered: 7/22/2022    Expires: 48 hours after scheduled appointment time      Patient may resume normal activity unless otherwise instructed. Patient may resume medications as normal.  If the need should arise, patient may call [phone] between the hours of 8:00am-5:00pm.  After hours there is at least one physician on-call at all times for those patients needing assistance. Patients may call [phone] and the answering service will direct the patient to one of our physicians for assistance. After the patient's test if they are going to be leaving from an airport in the near future they should take this letter with them to verify the test and radionuclide used for their test.      This letter verifies that the above named bearer received an injection of a radionuclide for medical purpose/usage only.         Electronically signed by Emmett Ochoa on 7/22/2022 at 11:45 AM

## 2022-07-25 ENCOUNTER — TELEPHONE (OUTPATIENT)
Dept: CARDIOLOGY | Age: 67
End: 2022-07-25

## 2022-07-26 ENCOUNTER — HOSPITAL ENCOUNTER (OUTPATIENT)
Dept: CARDIOLOGY | Age: 67
Discharge: HOME | End: 2022-07-26
Payer: MEDICARE

## 2022-07-26 ENCOUNTER — TELEPHONE (OUTPATIENT)
Dept: CARDIOLOGY CLINIC | Age: 67
End: 2022-07-26

## 2022-07-26 DIAGNOSIS — R53.83 OTHER FATIGUE: ICD-10-CM

## 2022-07-26 DIAGNOSIS — R94.31 ABNORMAL EKG: ICD-10-CM

## 2022-07-26 DIAGNOSIS — I48.20 CHRONIC ATRIAL FIBRILLATION (HCC): ICD-10-CM

## 2022-07-26 DIAGNOSIS — R06.09 DOE (DYSPNEA ON EXERTION): ICD-10-CM

## 2022-07-26 LAB
LV EF: 55 %
LVEF MODALITY: NORMAL

## 2022-07-26 PROCEDURE — 93306 TTE W/DOPPLER COMPLETE: CPT

## 2022-07-29 ENCOUNTER — TELEPHONE (OUTPATIENT)
Dept: CARDIOLOGY CLINIC | Age: 67
End: 2022-07-29

## 2022-07-29 NOTE — TELEPHONE ENCOUNTER
----- Message from [facility] sent at 7/28/2022  5:18 PM EDT -----    ----- Message -----  From: Padmini Mcqueen MD  Sent: 7/28/2022   5:11 PM EDT  To: [email or web address]    Echocardiogram showed mildly dilated heart chambers with normal function. Follow-up with him as scheduled.

## 2022-11-10 ENCOUNTER — OFFICE VISIT (OUTPATIENT)
Dept: CARDIOLOGY CLINIC | Age: 67
End: 2022-11-10
Payer: MEDICARE

## 2022-11-10 VITALS
RESPIRATION RATE: 18 BRPM | BODY MASS INDEX: 40.43 KG/M2 | SYSTOLIC BLOOD PRESSURE: 139 MMHG | DIASTOLIC BLOOD PRESSURE: 96 MMHG | WEIGHT: 315 LBS | OXYGEN SATURATION: 95 % | HEIGHT: 74 IN | HEART RATE: 71 BPM

## 2022-11-10 DIAGNOSIS — E66.01 CLASS 3 SEVERE OBESITY DUE TO EXCESS CALORIES WITH SERIOUS COMORBIDITY AND BODY MASS INDEX (BMI) OF 40.0 TO 44.9 IN ADULT (HCC): ICD-10-CM

## 2022-11-10 DIAGNOSIS — G47.33 OSA (OBSTRUCTIVE SLEEP APNEA): ICD-10-CM

## 2022-11-10 DIAGNOSIS — F10.10 ALCOHOL ABUSE: ICD-10-CM

## 2022-11-10 DIAGNOSIS — I10 ESSENTIAL HYPERTENSION: ICD-10-CM

## 2022-11-10 DIAGNOSIS — R53.83 OTHER FATIGUE: ICD-10-CM

## 2022-11-10 DIAGNOSIS — J41.8 MIXED SIMPLE AND MUCOPURULENT CHRONIC BRONCHITIS (HCC): ICD-10-CM

## 2022-11-10 DIAGNOSIS — I48.20 CHRONIC ATRIAL FIBRILLATION (HCC): Primary | ICD-10-CM

## 2022-11-10 DIAGNOSIS — Z98.890 S/P LUMBAR LAMINECTOMY: ICD-10-CM

## 2022-11-10 DIAGNOSIS — J43.2 CENTRILOBULAR EMPHYSEMA (HCC): ICD-10-CM

## 2022-11-10 DIAGNOSIS — R06.09 DOE (DYSPNEA ON EXERTION): ICD-10-CM

## 2022-11-10 PROCEDURE — 3074F SYST BP LT 130 MM HG: CPT | Performed by: INTERNAL MEDICINE

## 2022-11-10 PROCEDURE — 3078F DIAST BP <80 MM HG: CPT | Performed by: INTERNAL MEDICINE

## 2022-11-10 PROCEDURE — 1123F ACP DISCUSS/DSCN MKR DOCD: CPT | Performed by: INTERNAL MEDICINE

## 2022-11-10 PROCEDURE — 99214 OFFICE O/P EST MOD 30 MIN: CPT | Performed by: INTERNAL MEDICINE

## 2022-11-10 PROCEDURE — 93000 ELECTROCARDIOGRAM COMPLETE: CPT | Performed by: INTERNAL MEDICINE

## 2022-11-10 NOTE — PROGRESS NOTES
Out Patient CARDIOLOGY Follow Up Visit    Name: Ahsan Jesus    Age: 75 y.o. Date of Admission: No admission date for patient encounter. Date of Service: 11/10/2022    Reason for Consultation:   Chief Complaint   Patient presents with    Hypertension          Referring Physician: No admitting provider for patient encounter. History of Present Illness: 29-year-old male with history of tobacco abuse quit in 2014, history of alcohol abuse, duodenal ulcer, morbid obesity, hypertension, hearing loss, history of bilateral carpal tunnel syndrome, COPD, chronic back pain, atrial fibrillation and chronic kidney disease who present for follow-up visit. During last visit, he reported dyspnea on exertion and fatigue with activities and overall not feeling himself with lack of energy and underwent echo and ischemic evaluation with stress test which were unrevealing. Despite no significant abnormalities found on echo and stress test he continues to have symptoms. I discussed trying maintenance in sinus rhythm to see if symptoms will improve and he will is willing to try JIM guided cardioversion to see if maintenance in sinus rhythm will improve his symptoms. He report he does not know if he has missed some doses of Eliquis and states sometimes he does not remember to take this medication and subsequently JIM will be done prior to cardioversion. Medical History:  History of cigarette abuse 2 ppd x 39 years quit in 2014. ETOH abuse up to 12 beers a day  drinking 3-4 beers a day 2018 and 2019. Duodenal ulcer in 1970's. Morbid obesity. BMI  - 03/2020. HTN. Hearing loss. Hx LE cellulitis. Bilateral carpal tunnel syndrome. 07/2015 Bilateral serous OM s/p bilateral tympanostomy and tubes. 07/2015 Nasal endoscopy and biopsy of nasopharyngeal lesion. MARISEL non-complaint with C-pap--> With repeat sleep study done 3/8/2016 showing moderate sleep apnea syndrome with significant oxygen  desaturation.    COPD Gold Stage II.  04/30/2018, Mechanical fall resulting in left displaced lateral mallelous and deltoid ligament injury s/p  ORIF L lateral malleolus. s/p Lumbar laminectomy. 07/02/2018 Hernia repair, uvulectomy 1990's, bilateral cataract   Hospitalized 10/14/2019 through 10/18/2019 for COPD exacerbation and atrial fibrillation with rapid ventricular response. He was anticoagulated for [id number]= 2.  proBNP was only 434 although it was felt that this may have been depressed by obesity. He was diuresed for 4 L. Echo 10/14/2019: Biplane contrast EF 61%. Left atrium mildly dilated. AF.  RVSP 31. No significant valve abnormality  Hospitalized 12/07/2019 through 12/13/2019. He had a left chest wall abscess/cellulitis which was incised and drained. He was seen by nephrology for BUN 27, creatinine 1.6 and GFR 24. This was attributed to intravascular volume depletion primarily.            Past Medical History:  Past Medical History:   Diagnosis Date    Atrial fibrillation (HCC)     COPD (chronic obstructive pulmonary disease) ([facility] [address]     Hypertension     Osteoarthritis     Sleep apnea     REFUSES CPAP    Spinal stenosis        Past Surgical History:  Past Surgical History:   Procedure Laterality Date    HERNIA REPAIR      NOSE SURGERY      OPEN TREATMENT FRACTURE DISTAL TIBIA FIBULA Left 4/29/2018    left ANKLE OPEN REDUCTION INTERNAL FIXATION performed by Dmitry Mandujano MD at [facility] LAMINECTOMY,[address] N/A 7/2/2018    L3- S1 LUMBAR LAMINECTOMY performed by Jung Toledo MD at [facility] [place]    TYMPANOSTOMY TUBE PLACEMENT      UVULECTOMY         Family History:  Family History   Problem Relation Age of Onset    Cancer Mother     Other Mother         copd    Cirrhosis Father     Lupus Sister     Stroke Brother     High Blood Pressure Brother        Social History:  Social History     Socioeconomic History    Marital status:      Spouse name: Not on file    Number of children: 3    Years of education: Not mouth daily   Yes Historical Provider, MD   albuterol sulfate HFA (PROAIR HFA) 108 (90 Base) MCG/ACT inhaler USE 2 INHALATIONS EVERY 4 HOURS AS NEEDED 3/24/22  Yes Kina Rojas, DO   NONFORMULARY Zinc   Yes Historical Provider, MD   metoprolol tartrate (LOPRESSOR) 25 MG tablet Take 1 tablet by mouth 2 times daily 2/11/22 11/10/22 Yes Ana Matamoros, DO   SYMBICORT 160-4.5 MCG/ACT AERO Inhale 2 puffs into the lungs 2 times daily 12/20/21  Yes Sukhdeep Valencia, DO   Probiotic Product (PROBIOTIC ADVANCED) CAPS Take by mouth   Yes Historical Provider, MD   Multiple Vitamins-Minerals (THERAPEUTIC MULTIVITAMIN-MINERALS) tablet Take 1 tablet by mouth daily   Yes Historical Provider, MD       Current Medications:  Current Outpatient Medications   Medication Sig Dispense Refill    irbesartan (AVAPRO) 300 MG tablet Take 1 tablet by mouth nightly 90 tablet 1    nystatin (MYCOSTATIN) 346808 UNIT/GM powder Apply 3 times daily.  90 g 2    apixaban (ELIQUIS) 5 MG TABS tablet Take 1 tablet by mouth 2 times daily 60 tablet 3    albuterol (PROVENTIL) (2.5 MG/3ML) 0.083% nebulizer solution TAKE 3 MLS BY NEBULIZATION EVERY 6 HOURS AS NEEDED 360 mL 5    Respiratory Therapy Supplies (NEBULIZER) ADRIENNE 1 Device by Does not apply route 4 times daily as needed (emphysema) 1 each 0    Cholecalciferol (VITAMIN D3) 50 MCG (2000 UT) CAPS Take by mouth      Ascorbic Acid (VITAMIN C) 250 MG tablet Take 250 mg by mouth daily      albuterol sulfate HFA (PROAIR HFA) 108 (90 Base) MCG/ACT inhaler USE 2 INHALATIONS EVERY 4 HOURS AS NEEDED 18 g 5    NONFORMULARY Zinc      metoprolol tartrate (LOPRESSOR) 25 MG tablet Take 1 tablet by mouth 2 times daily 180 tablet 1    SYMBICORT 160-4.5 MCG/ACT AERO Inhale 2 puffs into the lungs 2 times daily 30.6 g 3    Probiotic Product (PROBIOTIC ADVANCED) CAPS Take by mouth      Multiple Vitamins-Minerals (THERAPEUTIC MULTIVITAMIN-MINERALS) tablet Take 1 tablet by mouth daily       Current Facility-Administered Medications   Medication Dose Route Frequency Provider Last Rate Last Admin    regadenoson (LEXISCAN) injection 0.4 mg  0.4 mg IntraVENous ONCE PRN Augie Wade MD        perflutren lipid microspheres (DEFINITY) injection 1.65 mg  1.5 mL IntraVENous ONCE PRN Serena Wade MD                 Review of Systems:   Cardiac: As per HPI  General: Denies fever or chills  Pulmonary: As per HPI  HEENT: Denies runny nose  GI: No complaints  : No complaints  Endocrine: Denies night sweats  Musculoskeletal: No complaints  Skin: Dry skin  Neuro: No complaints  Psych: Denies depression    Physical Exam:  BP (!) 139/96   Pulse 71   Resp 18   Ht 6' 2\" (1.88 m)   Wt (!) 390 lb 14.4 oz (177.3 kg)   SpO2 95%   BMI 50.19 kg/m²   Wt Readings from Last 3 Encounters:   11/10/22 (!) 390 lb 14.4 oz (177.3 kg)   08/03/22 (!) 387 lb 12.8 oz (175.9 kg)   07/22/22 (!) 384 lb (174.2 kg)       Appearance: Alert and oriented x3 not in acute distress. Skin: Dry skin  Head: Atraumatic  Eyes: Intact extraocular muscles   ENMT: Mucous membranes are moist  Neck: Supple  Lungs: Clear to auscultation  Cardiac: Normal S1 and S2  Abdomen: Protuberant  Extremities: Intact range of motion  Neurologic: No focal neurological deficits  Peripheral Pulses: 2+ peripheral pulses      Laboratory Tests:  No results for input(s): NA, K, CL, CO2, BUN, CREATININE, GLUCOSE, CALCIUM in the last 72 hours. Lab Results   Component Value Date/Time    MG 1.9 12/13/2019 05:07 AM    MG 2.0 12/12/2019 04:45 AM    MG 2.0 12/11/2019 03:50 AM     No results for input(s): ALKPHOS, ALT, AST, PROT, BILITOT, BILIDIR, LABALBU in the last 72 hours. No results for input(s): WBC, RBC, HGB, HCT, MCV, MCH, MCHC, RDW, PLT, MPV in the last 72 hours.   Lab Results   Component Value Date    CKTOTAL 66 10/19/2019    CKMB 2.1 10/19/2019    TROPONINI <0.01 06/05/2020    TROPONINI <0.01 12/07/2019    TROPONINI <0.01 10/20/2019     No results for input(s): CKTOTAL, CKMB, Damaris Mattson in the last 72 hours. Lab Results   Component Value Date    INR 2.2 12/07/2019    INR 0.9 06/25/2018    INR 1.0 04/29/2018    PROTIME 26.0 (H) 12/07/2019    PROTIME 10.3 06/25/2018    PROTIME 11.3 04/29/2018     Lab Results   Component Value Date    TSH 1.330 06/24/2022    TSH 0.285 10/20/2019    TSH 0.511 10/15/2019     Lab Results   Component Value Date    LABA1C 5.9 (H) 10/15/2019     No results found for: EAG  Lab Results   Component Value Date    CHOL 201 (H) 06/24/2022    CHOL 239 (H) 05/03/2021    CHOL 189 10/15/2019     Lab Results   Component Value Date    TRIG 79 06/24/2022    TRIG 126 05/03/2021    TRIG 86 10/15/2019     Lab Results   Component Value Date    HDL 89 06/24/2022    HDL 74 (H) 05/03/2021    HDL 89 10/15/2019     Lab Results   Component Value Date    LDLCALC 96 06/24/2022    LDLCALC 140 (H) 05/03/2021    LDLCALC 83 10/15/2019     Lab Results   Component Value Date    LABVLDL 16 06/24/2022    LABVLDL 17 10/15/2019     Lab Results   Component Value Date    CHOLHDLRATIO 3.2 05/03/2021     No results for input(s): PROBNP in the last 72 hours. Cardiac Tests:  EKG reviewed (EKG date: Atrial fibrillation, 71 bpm, nonspecific ST-T wave changes):      TTE 7/2022  Left ventricular size is grossly normal.  Ejection fraction is visually estimated at 55%. Moderate left ventricular concentric hypertrophy noted. The left atrium is moderately dilated. Mildly dilated right ventricle. Normal right ventricular function. Stress MPI 7/2022  Impression:    Technically limited study due to body habitus. ECG during the infusion did not change, with baseline atrial fibrillation. The myocardial perfusion imaging was normal with attenuation artifact. Overall left ventricular systolic function was normal without regional wall motion abnormalities. Low risk general pharmacologic stress test.     Echocardiogram reviewed: 2019  Summary   Ejection fraction contrast biplane =61%.    The left atrium is moderately dilated. Atrial fibrillation   Normal mitral valve structure and function. The aortic valve appears mildly sclerotic. No pulmonary hypertension    Stress test reviewed:      Cardiac catheterization reviewed:     CXR reviewed: The 10-year ASCVD risk score (Mindy DOUGLASS, et al., 2019) is: 15.9%    Values used to calculate the score:      Age: 75 years      Sex: Male      Is Non- : No      Diabetic: No      Tobacco smoker: No      Systolic Blood Pressure: 982 mmHg      Is BP treated: Yes      HDL Cholesterol: 89 mg/dL      Total Cholesterol: 201 mg/dL    ASSESSMENT / PLAN:    1. Chronic atrial fibrillation (HCC)  Reports symptoms of dyspnea on exertion and fatigue and lack of energy  Recent TSH was fine  Recent echo and stress test were unrevealing  He would like to try maintenance in sinus rhythm to see if symptoms will improve and subsequently am arrange for JIM guided cardioversion  He report episodes of missing doses of Eliquis and thus JIM guided cardioversion is arranged  Continue beta-blocker and Eliquis    2. Other forms of angina pectoris ([address]  Improved but still with dyspnea on exertion and fatigue    3. Abnormal EKG  -As mentioned above    4. GONZALEZ (dyspnea on exertion)  --As mentioned above    5. Other fatigue  -As mentioned above    6. Alcohol abuse  He was advised to refrain from alcohol abuse    7. MARISEL (obstructive sleep apnea)  Follow-up with your PCP and arrange for sleep apnea evaluation. 8. Class 3 severe obesity due to excess calories with serious comorbidity and body mass index (BMI) of 40.0 to 44.9 in adult [facility])  Weight loss was recommended as well as aerobic exercise and appropriate heart healthy diet    9. Essential hypertension  Report blood pressure is better controlled at home  Follow-up with your PCP for blood pressure management and monitoring  10.  Centrilobular emphysema ([address]  Follow-up with your pulmonologist      Follow in Return in about 3 months (around 2/10/2023). Thank you for allowing me to participate in your patient's care. Please feel free to contact me if you have any questions or concerns.     Ilsa Daniels MD  [facility]) Cardiology

## 2022-11-11 ENCOUNTER — TELEPHONE (OUTPATIENT)
Dept: CARDIOLOGY CLINIC | Age: 67
End: 2022-11-11

## 2022-11-11 NOTE — TELEPHONE ENCOUNTER
Dr. Charlotte Gage patient scheduled for JIM/DCCV at [facility] on 11/22/2022 for atrial fibrillation. Please obtain authorization and advise. Thank you. JIM  [facility]  Dx:  Afib I48.91

## 2022-11-21 ENCOUNTER — TELEPHONE (OUTPATIENT)
Dept: NON INVASIVE DIAGNOSTICS | Age: 67
End: 2022-11-21

## 2022-11-21 NOTE — TELEPHONE ENCOUNTER
Called patient's wife, Nyla Salguero, to remind of procedure on 11/22, she states that the patient has bronchitis and has to cancel. Nyla Salguero was instructed to call Dr. Justen Navarro office when he has recovered from the bronchitis to reschedule.

## 2022-12-20 PROBLEM — J01.90 ACUTE BACTERIAL SINUSITIS: Status: ACTIVE | Noted: 2022-12-20

## 2022-12-20 PROBLEM — B96.89 ACUTE BACTERIAL SINUSITIS: Status: ACTIVE | Noted: 2022-12-20

## 2023-02-27 PROBLEM — J43.2 CENTRILOBULAR EMPHYSEMA (HCC): Status: RESOLVED | Noted: 2020-01-20 | Resolved: 2023-02-27

## 2023-02-27 PROBLEM — Z12.5 SPECIAL SCREENING FOR MALIGNANT NEOPLASM OF PROSTATE: Status: ACTIVE | Noted: 2023-02-27

## 2023-02-27 PROBLEM — J01.90 ACUTE BACTERIAL SINUSITIS: Status: RESOLVED | Noted: 2022-12-20 | Resolved: 2023-02-27

## 2023-02-27 PROBLEM — B96.89 ACUTE BACTERIAL SINUSITIS: Status: RESOLVED | Noted: 2022-12-20 | Resolved: 2023-02-27

## 2023-03-29 PROBLEM — Z12.5 SPECIAL SCREENING FOR MALIGNANT NEOPLASM OF PROSTATE: Status: RESOLVED | Noted: 2023-02-27 | Resolved: 2023-03-29

## 2023-07-10 PROBLEM — E66.813 CLASS 3 SEVERE OBESITY DUE TO EXCESS CALORIES WITH SERIOUS COMORBIDITY AND BODY MASS INDEX (BMI) OF 45.0 TO 49.9 IN ADULT: Status: ACTIVE | Noted: 2023-07-10

## 2023-07-10 PROBLEM — E66.01 CLASS 3 SEVERE OBESITY DUE TO EXCESS CALORIES WITH SERIOUS COMORBIDITY AND BODY MASS INDEX (BMI) OF 45.0 TO 49.9 IN ADULT (HCC): Status: ACTIVE | Noted: 2023-07-10

## 2023-07-10 PROBLEM — E66.01 CLASS 3 SEVERE OBESITY DUE TO EXCESS CALORIES WITH SERIOUS COMORBIDITY AND BODY MASS INDEX (BMI) OF 40.0 TO 44.9 IN ADULT (HCC): Status: RESOLVED | Noted: 2020-01-20 | Resolved: 2023-07-10

## 2023-07-10 PROBLEM — E66.813 CLASS 3 SEVERE OBESITY DUE TO EXCESS CALORIES WITH SERIOUS COMORBIDITY AND BODY MASS INDEX (BMI) OF 40.0 TO 44.9 IN ADULT: Status: RESOLVED | Noted: 2020-01-20 | Resolved: 2023-07-10

## 2023-09-08 PROBLEM — T30.0 BURN: Status: ACTIVE | Noted: 2023-09-08

## 2023-11-27 PROBLEM — K76.0 FATTY LIVER: Status: ACTIVE | Noted: 2023-11-27

## 2024-08-23 ENCOUNTER — OFFICE VISIT (OUTPATIENT)
Dept: CARDIOLOGY CLINIC | Age: 69
End: 2024-08-23

## 2024-08-23 VITALS
RESPIRATION RATE: 18 BRPM | WEIGHT: 315 LBS | HEIGHT: 74 IN | DIASTOLIC BLOOD PRESSURE: 72 MMHG | BODY MASS INDEX: 40.43 KG/M2 | HEART RATE: 68 BPM | SYSTOLIC BLOOD PRESSURE: 124 MMHG

## 2024-08-23 DIAGNOSIS — S93.491S: ICD-10-CM

## 2024-08-23 DIAGNOSIS — R94.31 ABNORMAL EKG: ICD-10-CM

## 2024-08-23 DIAGNOSIS — K76.0 FATTY LIVER: ICD-10-CM

## 2024-08-23 DIAGNOSIS — J41.1 MUCOPURULENT CHRONIC BRONCHITIS (HCC): ICD-10-CM

## 2024-08-23 DIAGNOSIS — R06.09 DOE (DYSPNEA ON EXERTION): ICD-10-CM

## 2024-08-23 DIAGNOSIS — G47.33 OBSTRUCTIVE SLEEP APNEA (ADULT) (PEDIATRIC): ICD-10-CM

## 2024-08-23 DIAGNOSIS — I48.0 PAROXYSMAL ATRIAL FIBRILLATION (HCC): Primary | ICD-10-CM

## 2024-08-23 DIAGNOSIS — R53.83 OTHER FATIGUE: ICD-10-CM

## 2024-08-23 DIAGNOSIS — I10 ESSENTIAL HYPERTENSION: ICD-10-CM

## 2024-08-23 DIAGNOSIS — Z98.890 S/P LUMBAR LAMINECTOMY: ICD-10-CM

## 2024-08-23 DIAGNOSIS — F10.10 ALCOHOL ABUSE: ICD-10-CM

## 2024-08-23 DIAGNOSIS — E66.01 CLASS 3 SEVERE OBESITY DUE TO EXCESS CALORIES WITH SERIOUS COMORBIDITY AND BODY MASS INDEX (BMI) OF 45.0 TO 49.9 IN ADULT (HCC): ICD-10-CM

## 2024-08-23 DIAGNOSIS — R07.9 CHEST PAIN, UNSPECIFIED TYPE: ICD-10-CM

## 2024-08-23 DIAGNOSIS — I48.20 CHRONIC ATRIAL FIBRILLATION (HCC): ICD-10-CM

## 2024-08-23 NOTE — PROGRESS NOTES
mouth 2 times daily 9/28/22  Yes Ana Matamoros DO   vitamin D3 (CHOLECALCIFEROL) 25 MCG (1000 UT) TABS tablet Take by mouth   Yes Kay Osborne MD   Ascorbic Acid (VITAMIN C) 250 MG tablet Take 4 tablets by mouth daily   Yes Kay Osborne MD   albuterol sulfate HFA (PROAIR HFA) 108 (90 Base) MCG/ACT inhaler USE 2 INHALATIONS EVERY 4 HOURS AS NEEDED 3/24/22  Yes Sukhdeep Valencia DO   NONFORMULARY Zinc   Yes Kay Osborne MD   metoprolol tartrate (LOPRESSOR) 25 MG tablet Take 1 tablet by mouth 2 times daily 2/11/22 8/23/24 Yes Ana Matamoros DO   Probiotic Product (PROBIOTIC ADVANCED) CAPS Take by mouth   Yes Kay Osborne MD   Multiple Vitamins-Minerals (THERAPEUTIC MULTIVITAMIN-MINERALS) tablet Take 1 tablet by mouth daily   Yes Kay Osborne MD   silver sulfADIAZINE (SILVADENE) 1 % cream Apply topically daily.  Patient not taking: Reported on 8/15/2024 8/28/23   Ana Matamoros DO   clotrimazole-betamethasone (LOTRISONE) 1-0.05 % cream Apply topically 2 times daily.  Patient not taking: Reported on 8/15/2024 12/20/22   Ana Matamoros DO       Current Medications:  Current Outpatient Medications   Medication Sig Dispense Refill    budesonide-formoterol (SYMBICORT) 160-4.5 MCG/ACT AERO Inhale 2 puffs into the lungs 2 times daily 10.2 g 5    albuterol (PROVENTIL) (2.5 MG/3ML) 0.083% nebulizer solution TAKE 3 MLS BY NEBULIZATION EVERY 6 HOURS AS NEEDED 360 mL 5    irbesartan (AVAPRO) 300 MG tablet Take 1 tablet by mouth nightly 90 tablet 1    apixaban (ELIQUIS) 5 MG TABS tablet Take 1 tablet by mouth 2 times daily 60 tablet 3    vitamin D3 (CHOLECALCIFEROL) 25 MCG (1000 UT) TABS tablet Take by mouth      Ascorbic Acid (VITAMIN C) 250 MG tablet Take 4 tablets by mouth daily      albuterol sulfate HFA (PROAIR HFA) 108 (90 Base) MCG/ACT inhaler USE 2 INHALATIONS EVERY 4 HOURS AS NEEDED 18 g 5    NONFORMULARY Zinc      metoprolol tartrate (LOPRESSOR) 25 MG tablet Take 1 tablet by

## 2024-08-27 ENCOUNTER — TELEPHONE (OUTPATIENT)
Dept: CARDIOLOGY CLINIC | Age: 69
End: 2024-08-27

## 2024-08-27 NOTE — TELEPHONE ENCOUNTER
----- Message from Dr. Augie Wade MD sent at 8/25/2024 10:01 PM EDT -----  Regarding: Cardiac PET and JIM guided cardioversion  Please arrange for cardiac PET myocardial perfusion stress test at the [facility].  Also please arrange for JIM guided cardioversion at [facility] [place], [place], or [facility] what ever patient prefers

## 2024-08-27 NOTE — TELEPHONE ENCOUNTER
Patient needs prior authorization for JIM/Cardioversion for AFib ordered by Dr. Wade not scheduled pending prior auth. Thanks     CPT 84545  ICD I48.91      Patient needs PET Stress to be done at [facility] for Chest pain pending prior authorization not scheduled ordered by Dr. Wade. Order faxed to [facility] [phone] Thank you     CPT 03642  ICD R07.9   none

## 2024-08-28 NOTE — TELEPHONE ENCOUNTER
Prior authorization NOT REQUIRED for CPT CODE 15798.    Reference # [id number]    [facility]         Prior authorization is PENDING for CPT code 18153. Clinical documentation was uploaded to [employer].    Case number: [id number]      Will keep you updated

## 2024-11-08 ENCOUNTER — OFFICE VISIT (OUTPATIENT)
Dept: CARDIOLOGY CLINIC | Age: 69
End: 2024-11-08

## 2024-11-08 VITALS
BODY MASS INDEX: 40.43 KG/M2 | SYSTOLIC BLOOD PRESSURE: 126 MMHG | HEART RATE: 68 BPM | HEIGHT: 74 IN | DIASTOLIC BLOOD PRESSURE: 70 MMHG | WEIGHT: 315 LBS

## 2024-11-08 DIAGNOSIS — Z98.890 S/P LUMBAR LAMINECTOMY: ICD-10-CM

## 2024-11-08 DIAGNOSIS — I48.20 CHRONIC ATRIAL FIBRILLATION (HCC): ICD-10-CM

## 2024-11-08 DIAGNOSIS — R06.09 DOE (DYSPNEA ON EXERTION): ICD-10-CM

## 2024-11-08 DIAGNOSIS — E66.01 CLASS 3 SEVERE OBESITY DUE TO EXCESS CALORIES WITH SERIOUS COMORBIDITY AND BODY MASS INDEX (BMI) OF 45.0 TO 49.9 IN ADULT: ICD-10-CM

## 2024-11-08 DIAGNOSIS — I10 ESSENTIAL HYPERTENSION: ICD-10-CM

## 2024-11-08 DIAGNOSIS — S93.491S: ICD-10-CM

## 2024-11-08 DIAGNOSIS — I20.89 OTHER FORMS OF ANGINA PECTORIS (HCC): ICD-10-CM

## 2024-11-08 DIAGNOSIS — G47.33 OBSTRUCTIVE SLEEP APNEA (ADULT) (PEDIATRIC): ICD-10-CM

## 2024-11-08 DIAGNOSIS — I48.0 PAROXYSMAL ATRIAL FIBRILLATION (HCC): Primary | ICD-10-CM

## 2024-11-08 DIAGNOSIS — K76.0 FATTY LIVER: ICD-10-CM

## 2024-11-08 DIAGNOSIS — E66.813 CLASS 3 SEVERE OBESITY DUE TO EXCESS CALORIES WITH SERIOUS COMORBIDITY AND BODY MASS INDEX (BMI) OF 45.0 TO 49.9 IN ADULT: ICD-10-CM

## 2024-11-08 DIAGNOSIS — J42 CHRONIC BRONCHITIS, UNSPECIFIED CHRONIC BRONCHITIS TYPE (HCC): ICD-10-CM

## 2024-11-08 DIAGNOSIS — R53.83 OTHER FATIGUE: ICD-10-CM

## 2024-11-08 RX ORDER — ROSUVASTATIN CALCIUM 10 MG/1
10 TABLET, COATED ORAL DAILY
COMMUNITY
Start: 2024-09-21

## 2024-11-08 NOTE — PROGRESS NOTES
normal.  Ejection fraction is visually estimated at 55%.  Moderate left ventricular concentric hypertrophy noted.  The left atrium is moderately dilated.  Mildly dilated right ventricle. Normal right ventricular function.    Stress MPI 7/2022  Impression:    Technically limited study due to body habitus.  ECG during the infusion did not change, with baseline atrial fibrillation.  The myocardial perfusion imaging was normal with attenuation artifact.    Overall left ventricular systolic function was normal without regional wall motion abnormalities.  Low risk general pharmacologic stress test.     Echocardiogram reviewed: 2019  Summary   Ejection fraction contrast biplane =61%.   The left atrium is moderately dilated.   Atrial fibrillation   Normal mitral valve structure and function.   The aortic valve appears mildly sclerotic.   No pulmonary hypertension    Stress test reviewed:      Cardiac catheterization reviewed:     CXR reviewed:     The 10-year ASCVD risk score (Mindy DOUGLASS, et al., 2019) is: 15.6%    Values used to calculate the score:      Age: 69 years      Sex: Male      Is Non- : No      Diabetic: No      Tobacco smoker: No      Systolic Blood Pressure: 126 mmHg      Is BP treated: No      HDL Cholesterol: 67 mg/dL      Total Cholesterol: 201 mg/dL    ASSESSMENT / PLAN:    1. Chronic atrial fibrillation (HCC)  Reports symptoms of dyspnea on exertion and fatigue and lack of energy  Recent TSH was fine  Recent echo and stress test were unrevealing  If no ischemia on cardiac PET stress test we will discuss JIM guided cardioversion during next visit.  Continue beta-blocker and Eliquis    2. Other forms of angina pectoris (HCC)/dyspnea on exertion/fatigue with activities/shortness of breath with activity  He report intermittent chest pain, dyspnea on exertion, fatigue with activities and shortness of breath with activities and subsequently cardiac PET myocardial perfusion stress test is

## 2025-01-28 PROBLEM — I48.91 ATRIAL FIBRILLATION (HCC): Status: ACTIVE | Noted: 2020-01-20

## 2025-01-28 PROBLEM — J43.1 PANLOBULAR EMPHYSEMA (HCC): Status: ACTIVE | Noted: 2020-01-20

## 2025-02-10 ENCOUNTER — HOSPITAL ENCOUNTER (OUTPATIENT)
Dept: GENERAL RADIOLOGY | Age: 70
Discharge: HOME | End: 2025-02-12
Payer: MEDICARE

## 2025-02-10 ENCOUNTER — HOSPITAL ENCOUNTER (OUTPATIENT)
Age: 70
Discharge: HOME | End: 2025-02-10
Payer: MEDICARE

## 2025-02-10 ENCOUNTER — HOSPITAL ENCOUNTER (OUTPATIENT)
Age: 70
Discharge: HOME | End: 2025-02-12
Payer: MEDICARE

## 2025-02-10 DIAGNOSIS — R05.3 PERSISTENT COUGH: ICD-10-CM

## 2025-02-10 LAB
ALBUMIN SERPL-MCNC: 4.1 G/DL (ref 3.5–5.2)
ALP SERPL-CCNC: 123 U/L (ref 40–129)
ALT SERPL-CCNC: 41 U/L (ref 0–40)
ANION GAP SERPL CALCULATED.3IONS-SCNC: 10 MMOL/L (ref 7–16)
AST SERPL-CCNC: 28 U/L (ref 0–39)
BILIRUB SERPL-MCNC: 0.4 MG/DL (ref 0–1.2)
BUN SERPL-MCNC: 13 MG/DL (ref 6–23)
CALCIUM SERPL-MCNC: 9.4 MG/DL (ref 8.6–10.2)
CHLORIDE SERPL-SCNC: 102 MMOL/L (ref 98–107)
CHOLEST SERPL-MCNC: 146 MG/DL
CO2 SERPL-SCNC: 29 MMOL/L (ref 22–29)
CREAT SERPL-MCNC: 1 MG/DL (ref 0.7–1.2)
GFR, ESTIMATED: 83 ML/MIN/1.73M2
GLUCOSE SERPL-MCNC: 105 MG/DL (ref 74–99)
HBA1C MFR BLD: 5.8 % (ref 4–5.6)
HDLC SERPL-MCNC: 69 MG/DL
LDLC SERPL CALC-MCNC: 55 MG/DL
POTASSIUM SERPL-SCNC: 4.8 MMOL/L (ref 3.5–5)
PROT SERPL-MCNC: 7.5 G/DL (ref 6.4–8.3)
SODIUM SERPL-SCNC: 141 MMOL/L (ref 132–146)
TRIGL SERPL-MCNC: 112 MG/DL
VLDLC SERPL CALC-MCNC: 22 MG/DL

## 2025-02-10 PROCEDURE — 83036 HEMOGLOBIN GLYCOSYLATED A1C: CPT

## 2025-02-10 PROCEDURE — 80053 COMPREHEN METABOLIC PANEL: CPT

## 2025-02-10 PROCEDURE — 80061 LIPID PANEL: CPT

## 2025-02-10 PROCEDURE — 71046 X-RAY EXAM CHEST 2 VIEWS: CPT

## 2025-02-10 PROCEDURE — 36415 COLL VENOUS BLD VENIPUNCTURE: CPT

## 2025-05-01 ENCOUNTER — HOSPITAL ENCOUNTER (OUTPATIENT)
Age: 70
Discharge: HOME | End: 2025-05-01
Payer: MEDICARE

## 2025-05-01 ENCOUNTER — HOSPITAL ENCOUNTER (OUTPATIENT)
Dept: GENERAL RADIOLOGY | Age: 70
Discharge: HOME | End: 2025-05-03
Payer: MEDICARE

## 2025-05-01 DIAGNOSIS — J44.89 ASTHMA-COPD OVERLAP SYNDROME (HCC): ICD-10-CM

## 2025-05-01 DIAGNOSIS — R06.02 SOB (SHORTNESS OF BREATH): ICD-10-CM

## 2025-05-01 LAB — BNP SERPL-MCNC: 585 PG/ML (ref 0–125)

## 2025-05-01 PROCEDURE — 36415 COLL VENOUS BLD VENIPUNCTURE: CPT

## 2025-05-01 PROCEDURE — 83880 ASSAY OF NATRIURETIC PEPTIDE: CPT

## 2025-05-01 PROCEDURE — 71046 X-RAY EXAM CHEST 2 VIEWS: CPT

## 2025-05-19 ENCOUNTER — OFFICE VISIT (OUTPATIENT)
Dept: CARDIOLOGY CLINIC | Age: 70
End: 2025-05-19
Payer: MEDICARE

## 2025-05-19 ENCOUNTER — TELEPHONE (OUTPATIENT)
Dept: CARDIOLOGY CLINIC | Age: 70
End: 2025-05-19

## 2025-05-19 VITALS
HEIGHT: 74 IN | OXYGEN SATURATION: 93 % | WEIGHT: 315 LBS | HEART RATE: 68 BPM | SYSTOLIC BLOOD PRESSURE: 136 MMHG | DIASTOLIC BLOOD PRESSURE: 82 MMHG | BODY MASS INDEX: 40.43 KG/M2 | TEMPERATURE: 97 F | RESPIRATION RATE: 18 BRPM

## 2025-05-19 DIAGNOSIS — I48.0 PAROXYSMAL ATRIAL FIBRILLATION (HCC): Primary | ICD-10-CM

## 2025-05-19 DIAGNOSIS — Z98.890 S/P LUMBAR LAMINECTOMY: ICD-10-CM

## 2025-05-19 DIAGNOSIS — R06.02 SOB (SHORTNESS OF BREATH): ICD-10-CM

## 2025-05-19 DIAGNOSIS — J44.89 ASTHMA-COPD OVERLAP SYNDROME (HCC): ICD-10-CM

## 2025-05-19 DIAGNOSIS — F10.10 ALCOHOL ABUSE: ICD-10-CM

## 2025-05-19 DIAGNOSIS — R07.9 CHEST PAIN, UNSPECIFIED TYPE: ICD-10-CM

## 2025-05-19 DIAGNOSIS — R06.02 SHORTNESS OF BREATH: ICD-10-CM

## 2025-05-19 DIAGNOSIS — I15.1 HYPERTENSION SECONDARY TO OTHER RENAL DISORDERS: ICD-10-CM

## 2025-05-19 DIAGNOSIS — G47.33 OBSTRUCTIVE SLEEP APNEA (ADULT) (PEDIATRIC): ICD-10-CM

## 2025-05-19 DIAGNOSIS — J41.8 MIXED SIMPLE AND MUCOPURULENT CHRONIC BRONCHITIS (HCC): ICD-10-CM

## 2025-05-19 PROCEDURE — 1159F MED LIST DOCD IN RCRD: CPT | Performed by: INTERNAL MEDICINE

## 2025-05-19 PROCEDURE — 99214 OFFICE O/P EST MOD 30 MIN: CPT | Performed by: INTERNAL MEDICINE

## 2025-05-19 PROCEDURE — 1123F ACP DISCUSS/DSCN MKR DOCD: CPT | Performed by: INTERNAL MEDICINE

## 2025-05-19 PROCEDURE — 93000 ELECTROCARDIOGRAM COMPLETE: CPT | Performed by: INTERNAL MEDICINE

## 2025-05-19 PROCEDURE — 3079F DIAST BP 80-89 MM HG: CPT | Performed by: INTERNAL MEDICINE

## 2025-05-19 PROCEDURE — 3075F SYST BP GE 130 - 139MM HG: CPT | Performed by: INTERNAL MEDICINE

## 2025-05-19 RX ORDER — REGADENOSON 0.08 MG/ML
0.4 INJECTION, SOLUTION INTRAVENOUS
OUTPATIENT
Start: 2025-05-19

## 2025-05-19 RX ORDER — FUROSEMIDE 20 MG/1
20 TABLET ORAL DAILY
Qty: 90 TABLET | Refills: 2 | Status: SHIPPED | OUTPATIENT
Start: 2025-05-19 | End: 2025-05-22

## 2025-05-19 NOTE — PROGRESS NOTES
apnea)  Follow-up with your PCP and arrange for sleep apnea evaluation.    8. Class 3 severe obesity due to excess calories with serious comorbidity and body mass index (BMI) of 40.0 to 44.9 in adult (HCC)  Weight loss was recommended as well as aerobic exercise and appropriate heart healthy diet    9. Essential hypertension  Stable  Continue current med  10. Centrilobular emphysema (HCC)  Follow-up with your pulmonologist    11.  Hyperlipidemia  Continue statin therapy and follow-up with your PCP for routine blood work      Follow in Return in about 3 months (around 8/19/2025).      Thank you for allowing me to participate in your patient's care. Please feel free to contact me if you have any questions or concerns.    Augie Wade MD  [facility] Cardiology

## 2025-06-11 VITALS — HEIGHT: 74 IN | BODY MASS INDEX: 40.43 KG/M2 | WEIGHT: 315 LBS

## 2025-06-11 NOTE — PROGRESS NOTES
illegal drug use within 24 hours of surgery.    [x] Jewelry, body piercing's, eyeglasses, contact lenses and dentures are not permitted into surgery (bring cases)      [] Please do not wear any nail polish, make up or hair products on the day of surgery    [x] You can expect a call the business day prior to procedure to notify you if your arrival time changes    [x] If you receive a survey after surgery we would greatly appreciate your comments    [] Parent/guardian of a minor must accompany their child and remain on the premises  the entire time they are under our care     [] Pediatric patients may bring favorite toy, blanket or comfort item with them    [] A caregiver or family member must remain with the patient during their stay if they are mentally handicapped, have dementia, disoriented or unable to use a call light or would be a safety concern if left unattended    [x] Please notify surgeon if you develop any illness between now and time of surgery (cold, cough, sore throat, fever, nausea, vomiting) or any signs of infections  including skin, wounds, and dental.    [x]  The Outpatient Pharmacy is available to fill your prescription here on your day of surgery, ask your preop nurse for details    [] Other instructions

## 2025-06-17 ENCOUNTER — HOSPITAL ENCOUNTER (OUTPATIENT)
Age: 70
Discharge: HOME | End: 2025-06-17

## 2025-06-20 ENCOUNTER — TELEPHONE (OUTPATIENT)
Dept: CARDIOLOGY CLINIC | Age: 70
End: 2025-06-20

## 2025-06-20 ENCOUNTER — TELEPHONE (OUTPATIENT)
Dept: CARDIOLOGY | Age: 70
End: 2025-06-20

## 2025-06-20 DIAGNOSIS — I48.0 PAROXYSMAL ATRIAL FIBRILLATION (HCC): Primary | ICD-10-CM

## 2025-06-20 NOTE — TELEPHONE ENCOUNTER
Pt wife called in stating rico was given lasix \" one tablet by mouth for 3 days\" but given 90 pills and they wanted to know if he needs to take it everyday. Pt has been taking it everyday since last week.

## 2025-06-20 NOTE — TELEPHONE ENCOUNTER
SPOKE WITH PATIENT'S WIFE TO SCHEDULE LEXISCAN STRESS TEST.  WIFE STATES THAT SHE HAS BEEN TRYING TO REACH DR. MALONEY'S OFFICE TO SEE IF HE SHOULD HEAVE THE LEXISCAN BEFORE HIS JIM.  TRANSFERRED HER TO [facility].

## 2025-06-20 NOTE — TELEPHONE ENCOUNTER
Pt also wantes to know if they are supposed to get the stress test done beoffre the JIM or what.             Please advise

## 2025-06-25 NOTE — TELEPHONE ENCOUNTER
Dr lambert, you prescribed lasix for the patient. Do you want the patient to have the stress test ot fernando first.     Thanks

## 2025-07-11 ENCOUNTER — TELEPHONE (OUTPATIENT)
Dept: CARDIOLOGY | Age: 70
End: 2025-07-11

## (undated) DEVICE — DRAPE C ARM W41XL74IN UNIV MOB W RUBBERBAND CLP

## (undated) DEVICE — BNDG,ELSTC,MATRIX,STRL,6"X5YD,LF,HOOK&LP: Brand: MEDLINE

## (undated) DEVICE — BLADE CLIPPER GEN PURP NS

## (undated) DEVICE — PADDING CAST W6INXL4YD COT LO LINTING WYTEX

## (undated) DEVICE — GLOVE SURG SZ 85 STD WHT LTX SYN POLYMER BEAD REINF ANTI RL

## (undated) DEVICE — HYPODERMIC SAFETY NEEDLE: Brand: MAGELLAN

## (undated) DEVICE — SYRINGE MED 20ML STD CLR PLAS LUERLOCK TIP N CTRL DISP

## (undated) DEVICE — TOWEL,OR,DSP,ST,BLUE,STD,6/PK,12PK/CS: Brand: MEDLINE

## (undated) DEVICE — SHEET, T, LAPAROTOMY, STERILE: Brand: MEDLINE

## (undated) DEVICE — INTENDED FOR TISSUE SEPARATION, AND OTHER PROCEDURES THAT REQUIRE A SHARP SURGICAL BLADE TO PUNCTURE OR CUT.: Brand: BARD-PARKER ® STAINLESS STEEL BLADES

## (undated) DEVICE — Z CONVERTED USE 2275207 CLOTH PREP W7.5XL7.5IN 2% CHG SKIN ALC AND RNS FREE

## (undated) DEVICE — SKIN AFFIX SURG ADHESIVE 72/CS 0.55ML: Brand: MEDLINE

## (undated) DEVICE — ZIMMER® STERILE DISPOSABLE TOURNIQUET CUFF WITH PLC, DUAL PORT, SINGLE BLADDER, 34 IN. (86 CM)

## (undated) DEVICE — READY WET SKIN SCRUB TRAY-LF: Brand: MEDLINE INDUSTRIES, INC.

## (undated) DEVICE — CHLORAPREP 26ML ORANGE

## (undated) DEVICE — DOUBLE BASIN SET: Brand: MEDLINE INDUSTRIES, INC.

## (undated) DEVICE — SOLUTION IV IRRIG WATER 1000ML POUR BRL 2F7114

## (undated) DEVICE — 3M™ IOBAN™ 2 ANTIMICROBIAL INCISE DRAPE 6650EZ: Brand: IOBAN™ 2

## (undated) DEVICE — GLOVE SURG SZ 65 THK91MIL LTX FREE SYN POLYISOPRENE

## (undated) DEVICE — GLOVE ORANGE PI 8   MSG9080

## (undated) DEVICE — GAUZE,SPONGE,4"X4",8PLY,STRL,LF,10/TRAY: Brand: MEDLINE

## (undated) DEVICE — Device

## (undated) DEVICE — IMPLANTABLE DEVICE: Type: IMPLANTABLE DEVICE | Status: NON-FUNCTIONAL

## (undated) DEVICE — BANDAGE COMPR W6INXL12FT SMOOTH FOR LIMB EXSANG ESMARCH

## (undated) DEVICE — ELECTRODE PT RET AD L9FT HI MOIST COND ADH HYDRGEL CORDED

## (undated) DEVICE — GOWN,SIRUS,FABRNF,L,20/CS: Brand: MEDLINE

## (undated) DEVICE — STANDARD HYPODERMIC NEEDLE,ALUMINUM HUB: Brand: MONOJECT

## (undated) DEVICE — 1.5L THIN WALL CAN: Brand: CRD

## (undated) DEVICE — TUBING, SUCTION, 3/16" X 12', STRAIGHT: Brand: MEDLINE

## (undated) DEVICE — STERILE HOOK LOCK LATEX FREE ELASTIC BANDAGE 4INX5YD: Brand: HOOK LOCK™

## (undated) DEVICE — CODMAN® SURGICAL PATTIES 1/2" X 1" (1.27CM X 2.54CM): Brand: CODMAN®

## (undated) DEVICE — EXTRAS UGOKWE

## (undated) DEVICE — PAD,ABDOMINAL,5"X9",ST,LF,25/BX: Brand: MEDLINE INDUSTRIES, INC.

## (undated) DEVICE — GLOVE RADIOLOGY 7.5 LTX ATTENUATION STRL

## (undated) DEVICE — SURGICAL PROCEDURE PACK LAMINECTOMY LUMBAR

## (undated) DEVICE — GRADUATE

## (undated) DEVICE — 1000 S-DRAPE TOWEL DRAPE 10/BX: Brand: STERI-DRAPE™

## (undated) DEVICE — SYRINGE MED 10ML POLYPR LUERSLIP TIP FLAT TOP W/O SFTY DISP

## (undated) DEVICE — NEEDLE SPNL L3.5IN PNK HUB S STL REG WALL FIT STYL W/ QNCKE

## (undated) DEVICE — TOTAL TRAY, DB, 100% SILI FOLEY, 16FR 10: Brand: MEDLINE

## (undated) DEVICE — LABEL MED 4 IN SURG PANEL W/ PEN STRL

## (undated) DEVICE — GAUZE,SPONGE,4"X4",16PLY,XRAY,STRL,LF: Brand: MEDLINE

## (undated) DEVICE — CODMAN® SURGICAL PATTIES 1/2" X 1/2" (1.27CM X 1.27CM): Brand: CODMAN®

## (undated) DEVICE — DRAPE,EXTREMITY,89X128,STERILE: Brand: MEDLINE

## (undated) DEVICE — 3M(TM) MEDIPORE(TM) +PAD SOFT CLOTH ADHESIVE WOUND DRESSING 3569: Brand: 3M™ MEDIPORE™

## (undated) DEVICE — SET ORTHO WEITLANER

## (undated) DEVICE — KIT EVAC 400CC DIA1/8IN H PAT 12.5IN 3 SPR RND SHP PVC DRN

## (undated) DEVICE — APPLICATOR PREP 26ML 0.7% IOD POVACRYLEX 74% ISO ALC ST

## (undated) DEVICE — GOWN,SIRUS,FABRNF,XL,20/CS: Brand: MEDLINE

## (undated) DEVICE — TUBING SUCT 12FR MAL ALUM SHFT FN CAP VENT UNIV CONN W/ OBT

## (undated) DEVICE — SET ORTHO STD STORTSTD2

## (undated) DEVICE — DRESSING,GAUZE,XEROFORM,CURAD,1"X8",ST: Brand: CURAD

## (undated) DEVICE — 4-PORT MANIFOLD: Brand: NEPTUNE 2

## (undated) DEVICE — SET SPINAL NEURO STNEU1

## (undated) DEVICE — PACK PROCEDURE SURG GEN CUST

## (undated) DEVICE — BRUNNS CURRETTES

## (undated) DEVICE — PADDING,UNDERCAST,COTTON, 4"X4YD STERILE: Brand: MEDLINE

## (undated) DEVICE — GLOVE ORANGE PI 7   MSG9070

## (undated) DEVICE — BANDAGE COBAN 4 IN COMPR W4INXL5YD FOAM COHESIVE QUIK STK SELF ADH SFT

## (undated) DEVICE — BAG TRNSF AUTOLGS SUCT AND ANTICOAG LN AUTOLOG

## (undated) DEVICE — RESERVOIR CARDOTMY C4L HARDSHELL W/ 40UM FLTR EL SER 4 PRT

## (undated) DEVICE — KIT PLT RATIO DISPNS KT 2IN CANN TIP SPRY TIP DISP MAGELLAN

## (undated) DEVICE — BLADE ES L6IN ELASTOMERIC COAT EXT DURABLE BEND UPTO 90DEG

## (undated) DEVICE — ZELPHI RETRACTORS

## (undated) DEVICE — 5.0MM ROUND FLUTED

## (undated) DEVICE — KIT POS W/ FOAM ARM CRADL SHEARGUARD CHST PD CVR FOR SPNL